# Patient Record
Sex: FEMALE | Race: OTHER | HISPANIC OR LATINO | URBAN - METROPOLITAN AREA
[De-identification: names, ages, dates, MRNs, and addresses within clinical notes are randomized per-mention and may not be internally consistent; named-entity substitution may affect disease eponyms.]

---

## 2018-09-21 ENCOUNTER — OUTPATIENT (OUTPATIENT)
Dept: OUTPATIENT SERVICES | Facility: HOSPITAL | Age: 33
LOS: 1 days | Discharge: HOME | End: 2018-09-21

## 2018-09-21 ENCOUNTER — APPOINTMENT (OUTPATIENT)
Dept: ENDOCRINOLOGY | Facility: CLINIC | Age: 33
End: 2018-09-21

## 2018-09-21 VITALS
SYSTOLIC BLOOD PRESSURE: 110 MMHG | HEIGHT: 62 IN | WEIGHT: 175 LBS | BODY MASS INDEX: 32.2 KG/M2 | DIASTOLIC BLOOD PRESSURE: 75 MMHG

## 2018-10-19 ENCOUNTER — OUTPATIENT (OUTPATIENT)
Dept: OUTPATIENT SERVICES | Facility: HOSPITAL | Age: 33
LOS: 1 days | Discharge: HOME | End: 2018-10-19

## 2018-10-19 ENCOUNTER — APPOINTMENT (OUTPATIENT)
Dept: ENDOCRINOLOGY | Facility: CLINIC | Age: 33
End: 2018-10-19

## 2018-10-19 VITALS
WEIGHT: 176 LBS | HEART RATE: 70 BPM | DIASTOLIC BLOOD PRESSURE: 65 MMHG | BODY MASS INDEX: 32.39 KG/M2 | SYSTOLIC BLOOD PRESSURE: 118 MMHG | HEIGHT: 62 IN

## 2018-10-19 DIAGNOSIS — E11.9 TYPE 2 DIABETES MELLITUS WITHOUT COMPLICATIONS: ICD-10-CM

## 2018-10-19 LAB
BASOPHILS # BLD AUTO: 0.02 K/UL
BASOPHILS NFR BLD AUTO: 0.4 %
EOSINOPHIL # BLD AUTO: 0.08 K/UL
EOSINOPHIL NFR BLD AUTO: 1.8 %
HCT VFR BLD CALC: 41.4 %
HGB BLD-MCNC: 12.8 G/DL
IMM GRANULOCYTES NFR BLD AUTO: 0.4 %
LYMPHOCYTES # BLD AUTO: 1.49 K/UL
LYMPHOCYTES NFR BLD AUTO: 32.7 %
MAN DIFF?: NORMAL
MCHC RBC-ENTMCNC: 24.8 PG
MCHC RBC-ENTMCNC: 30.9 G/DL
MCV RBC AUTO: 80.2 FL
MONOCYTES # BLD AUTO: 0.51 K/UL
MONOCYTES NFR BLD AUTO: 11.2 %
NEUTROPHILS # BLD AUTO: 2.44 K/UL
NEUTROPHILS NFR BLD AUTO: 53.5 %
PLATELET # BLD AUTO: 209 K/UL
RBC # BLD: 5.16 M/UL
RBC # FLD: 12.9 %
WBC # FLD AUTO: 4.56 K/UL

## 2018-10-19 NOTE — PHYSICAL EXAM
[No Acute Distress] : no acute distress [Well Nourished] : well nourished [Well Developed] : well developed [No Respiratory Distress] : no respiratory distress  [Clear to Auscultation] : lungs were clear to auscultation bilaterally [No Accessory Muscle Use] : no accessory muscle use [Normal Rate] : normal rate  [Regular Rhythm] : with a regular rhythm [Normal S1, S2] : normal S1 and S2 [Soft] : abdomen soft [Non Tender] : non-tender [No HSM] : no HSM [Normal Bowel Sounds] : normal bowel sounds

## 2018-10-19 NOTE — HISTORY OF PRESENT ILLNESS
[de-identified] : 32 Yo F with PMH of DM I on insulin is here for follow up visit, patient has been compliant with her insulin, still expresses interest in an insulin pump. patient states she has not been checking fingersticks, despite having equipment.

## 2018-10-19 NOTE — ASSESSMENT
[FreeTextEntry1] : 34 yo F presents for follow up.\par \par # DM:\par - patient is currently taking Basal 22 units /Nutritional 8/8/8 insulin\par - will refer to diabetic educator regarding insulin pump.\par - patient will report for blood work today, was not able to do it prior\par - f/u in 1 month.

## 2018-10-26 LAB
ALBUMIN SERPL ELPH-MCNC: 4.3 G/DL
ALP BLD-CCNC: 84 U/L
ALT SERPL-CCNC: 14 U/L
ANION GAP SERPL CALC-SCNC: 14 MMOL/L
AST SERPL-CCNC: 14 U/L
BILIRUB SERPL-MCNC: 0.3 MG/DL
BUN SERPL-MCNC: 13 MG/DL
CALCIUM SERPL-MCNC: 9.1 MG/DL
CHLORIDE SERPL-SCNC: 99 MMOL/L
CHOLEST SERPL-MCNC: 175 MG/DL
CHOLEST/HDLC SERPL: 3 RATIO
CO2 SERPL-SCNC: 25 MMOL/L
CREAT SERPL-MCNC: 0.8 MG/DL
CREAT SPEC-SCNC: 63 MG/DL
ESTIMATED AVERAGE GLUCOSE: 326 MG/DL
GLUCOSE SERPL-MCNC: 258 MG/DL
HBA1C MFR BLD HPLC: 13 %
HDLC SERPL-MCNC: 58 MG/DL
LDLC SERPL CALC-MCNC: 106 MG/DL
MICROALBUMIN 24H UR DL<=1MG/L-MCNC: <1.2 MG/DL
MICROALBUMIN/CREAT 24H UR-RTO: NORMAL
POTASSIUM SERPL-SCNC: 4.1 MMOL/L
PROT SERPL-MCNC: 7.2 G/DL
SODIUM SERPL-SCNC: 138 MMOL/L
TRIGL SERPL-MCNC: 98 MG/DL

## 2018-11-29 ENCOUNTER — OTHER (OUTPATIENT)
Age: 33
End: 2018-11-29

## 2018-11-30 ENCOUNTER — APPOINTMENT (OUTPATIENT)
Dept: ENDOCRINOLOGY | Facility: CLINIC | Age: 33
End: 2018-11-30

## 2018-12-07 ENCOUNTER — APPOINTMENT (OUTPATIENT)
Dept: ENDOCRINOLOGY | Facility: CLINIC | Age: 33
End: 2018-12-07

## 2018-12-07 ENCOUNTER — OUTPATIENT (OUTPATIENT)
Dept: OUTPATIENT SERVICES | Facility: HOSPITAL | Age: 33
LOS: 1 days | Discharge: HOME | End: 2018-12-07

## 2018-12-13 ENCOUNTER — APPOINTMENT (OUTPATIENT)
Dept: ANTEPARTUM | Facility: CLINIC | Age: 33
End: 2018-12-13

## 2018-12-13 ENCOUNTER — OUTPATIENT (OUTPATIENT)
Dept: OUTPATIENT SERVICES | Facility: HOSPITAL | Age: 33
LOS: 1 days | Discharge: HOME | End: 2018-12-13

## 2018-12-18 ENCOUNTER — LABORATORY RESULT (OUTPATIENT)
Age: 33
End: 2018-12-18

## 2018-12-18 ENCOUNTER — OUTPATIENT (OUTPATIENT)
Dept: OUTPATIENT SERVICES | Facility: HOSPITAL | Age: 33
LOS: 1 days | Discharge: HOME | End: 2018-12-18

## 2018-12-18 ENCOUNTER — NON-APPOINTMENT (OUTPATIENT)
Age: 33
End: 2018-12-18

## 2018-12-18 ENCOUNTER — APPOINTMENT (OUTPATIENT)
Dept: ANTEPARTUM | Facility: CLINIC | Age: 33
End: 2018-12-18

## 2018-12-18 ENCOUNTER — APPOINTMENT (OUTPATIENT)
Dept: OBGYN | Facility: CLINIC | Age: 33
End: 2018-12-18

## 2018-12-18 VITALS
OXYGEN SATURATION: 97 % | SYSTOLIC BLOOD PRESSURE: 136 MMHG | HEART RATE: 84 BPM | DIASTOLIC BLOOD PRESSURE: 78 MMHG | BODY MASS INDEX: 32.01 KG/M2 | WEIGHT: 175 LBS | TEMPERATURE: 98.5 F

## 2018-12-18 DIAGNOSIS — Z80.42 FAMILY HISTORY OF MALIGNANT NEOPLASM OF PROSTATE: ICD-10-CM

## 2018-12-18 LAB
BILIRUB UR QL STRIP: NEGATIVE
CLARITY UR: CLEAR
COLLECTION METHOD: NORMAL
FETAL HEART RATE (BPM): 184
GLUCOSE BLDC GLUCOMTR-MCNC: 184
GLUCOSE BLDC GLUCOMTR-MCNC: 184 MG/DL — HIGH (ref 70–99)
GLUCOSE UR-MCNC: 1000
HCG UR QL: 0.2 EU/DL
HGB UR QL STRIP.AUTO: NEGATIVE
KETONES UR-MCNC: NORMAL
LEUKOCYTE ESTERASE UR QL STRIP: NEGATIVE
NITRITE UR QL STRIP: NEGATIVE
PH UR STRIP: 6
PROT UR STRIP-MCNC: NEGATIVE
SCHEDULED VISIT: YES
SP GR UR STRIP: 1.03
URINE ALBUMIN/PROTEIN: NEGATIVE
URINE GLUCOSE: 1000
URINE KETONES: NORMAL
WEEKS GESTATION: 7.6

## 2018-12-19 DIAGNOSIS — O24.111 PRE-EXISTING TYPE 2 DIABETES MELLITUS, IN PREGNANCY, FIRST TRIMESTER: ICD-10-CM

## 2018-12-19 DIAGNOSIS — O09.521 SUPERVISION OF ELDERLY MULTIGRAVIDA, FIRST TRIMESTER: ICD-10-CM

## 2018-12-19 DIAGNOSIS — O99.211 OBESITY COMPLICATING PREGNANCY, FIRST TRIMESTER: ICD-10-CM

## 2018-12-21 ENCOUNTER — OUTPATIENT (OUTPATIENT)
Dept: OUTPATIENT SERVICES | Facility: HOSPITAL | Age: 33
LOS: 1 days | Discharge: HOME | End: 2018-12-21

## 2018-12-21 ENCOUNTER — LABORATORY RESULT (OUTPATIENT)
Age: 33
End: 2018-12-21

## 2018-12-21 DIAGNOSIS — O24.919 UNSPECIFIED DIABETES MELLITUS IN PREGNANCY, UNSPECIFIED TRIMESTER: ICD-10-CM

## 2018-12-21 DIAGNOSIS — E11.65 TYPE 2 DIABETES MELLITUS WITH HYPERGLYCEMIA: ICD-10-CM

## 2018-12-23 LAB
ALBUMIN SERPL ELPH-MCNC: 3.8 G/DL
ALP BLD-CCNC: 55 U/L
ALT SERPL-CCNC: 12 U/L
ANION GAP SERPL CALC-SCNC: 12 MMOL/L
AST SERPL-CCNC: 10 U/L
BACTERIA UR CULT: NORMAL
BASOPHILS # BLD AUTO: 0.01 K/UL
BASOPHILS NFR BLD AUTO: 0.1 %
BILIRUB SERPL-MCNC: 0.3 MG/DL
BUN SERPL-MCNC: 14 MG/DL
CALCIUM SERPL-MCNC: 8.8 MG/DL
CHLORIDE SERPL-SCNC: 103 MMOL/L
CO2 SERPL-SCNC: 24 MMOL/L
CREAT 24H UR-MCNC: 2 G/24 HR
CREAT 24H UR-MCNC: 2 G/24 HR
CREAT ?TM UR-MCNC: 129 MG/DL
CREAT ?TM UR-MCNC: 129 MG/DL
CREAT SERPL-MCNC: 0.6 MG/DL
EOSINOPHIL # BLD AUTO: 0.02 K/UL
EOSINOPHIL NFR BLD AUTO: 0.3 %
ESTIMATED AVERAGE GLUCOSE: 286 MG/DL
GLUCOSE SERPL-MCNC: 119 MG/DL
HBA1C MFR BLD HPLC: 11.6 %
HBV SURFACE AG SERPL QL IA: NONREACTIVE
HCT VFR BLD CALC: 36.2 %
HGB BLD-MCNC: 11.3 G/DL
HIV1+2 AB SPEC QL IA.RAPID: NONREACTIVE
HPV HIGH+LOW RISK DNA PNL CVX: DETECTED
IMM GRANULOCYTES NFR BLD AUTO: 0.3 %
LDH SERPL-CCNC: 152
LYMPHOCYTES # BLD AUTO: 1.73 K/UL
LYMPHOCYTES NFR BLD AUTO: 23.4 %
MAN DIFF?: NORMAL
MCHC RBC-ENTMCNC: 24.8 PG
MCHC RBC-ENTMCNC: 31.2 G/DL
MCV RBC AUTO: 79.6 FL
MONOCYTES # BLD AUTO: 0.53 K/UL
MONOCYTES NFR BLD AUTO: 7.2 %
NEUTROPHILS # BLD AUTO: 5.08 K/UL
NEUTROPHILS NFR BLD AUTO: 68.7 %
PLATELET # BLD AUTO: 249 K/UL
POTASSIUM SERPL-SCNC: 3.7 MMOL/L
PROT 24H UR-MRATE: 17 MG/DL
PROT ?TM UR-MCNC: 24 HR
PROT ?TM UR-MCNC: 24 HR
PROT SERPL-MCNC: 6.6 G/DL
PROT UR-MCNC: 264 MG/24 H
RBC # BLD: 4.55 M/UL
RBC # FLD: 13.2 %
RUBV IGG FLD-ACNC: 1.1 INDEX
RUBV IGG SER-IMP: POSITIVE
SODIUM SERPL-SCNC: 139 MMOL/L
SPECIMEN VOL 24H UR: 1550 ML
SPECIMEN VOL 24H UR: 1550 ML
T PALLIDUM AB SER QL IA: NEGATIVE
URATE SERPL-MCNC: 3.5 MG/DL
VZV AB TITR SER: POSITIVE
VZV IGG SER IF-ACNC: 246.7 INDEX
WBC # FLD AUTO: 7.39 K/UL

## 2018-12-24 LAB
HGB A MFR BLD: 96.8 %
HGB A2 MFR BLD: 2.8 %
HGB F MFR BLD: 0.4 %
HGB FRACT BLD-IMP: NORMAL
LEAD BLD-MCNC: 2 UG/DL

## 2018-12-27 ENCOUNTER — NON-APPOINTMENT (OUTPATIENT)
Age: 33
End: 2018-12-27

## 2018-12-27 ENCOUNTER — APPOINTMENT (OUTPATIENT)
Dept: ANTEPARTUM | Facility: CLINIC | Age: 33
End: 2018-12-27

## 2018-12-27 ENCOUNTER — APPOINTMENT (OUTPATIENT)
Dept: OBGYN | Facility: CLINIC | Age: 33
End: 2018-12-27

## 2018-12-27 ENCOUNTER — OUTPATIENT (OUTPATIENT)
Dept: OUTPATIENT SERVICES | Facility: HOSPITAL | Age: 33
LOS: 1 days | Discharge: HOME | End: 2018-12-27

## 2018-12-27 ENCOUNTER — OTHER (OUTPATIENT)
Age: 33
End: 2018-12-27

## 2018-12-27 VITALS
HEART RATE: 87 BPM | BODY MASS INDEX: 32.37 KG/M2 | WEIGHT: 177 LBS | DIASTOLIC BLOOD PRESSURE: 68 MMHG | TEMPERATURE: 98 F | OXYGEN SATURATION: 95 % | SYSTOLIC BLOOD PRESSURE: 122 MMHG

## 2018-12-27 LAB
BILIRUB UR QL STRIP: NEGATIVE
CLARITY UR: CLEAR
COLLECTION METHOD: NORMAL
FETAL HEART RATE (BPM): 161
GLUCOSE BLDC GLUCOMTR-MCNC: 152
GLUCOSE BLDC GLUCOMTR-MCNC: 152 MG/DL — HIGH (ref 70–99)
GLUCOSE UR-MCNC: 500
HCG UR QL: 0.2 EU/DL
HGB UR QL STRIP.AUTO: NEGATIVE
KETONES UR-MCNC: NEGATIVE
LEUKOCYTE ESTERASE UR QL STRIP: NEGATIVE
NITRITE UR QL STRIP: NEGATIVE
PH UR STRIP: 6
PROT UR STRIP-MCNC: NEGATIVE
SCHEDULED VISIT: YES
SP GR UR STRIP: 1.02
URINE ALBUMIN/PROTEIN: NEGATIVE
URINE GLUCOSE: 500
URINE KETONES: NEGATIVE
WEEKS GESTATION: 9.1

## 2018-12-28 DIAGNOSIS — E10.9 TYPE 1 DIABETES MELLITUS WITHOUT COMPLICATIONS: ICD-10-CM

## 2018-12-31 LAB — AR GENE MUT ANL BLD/T: NEGATIVE

## 2019-01-03 ENCOUNTER — OUTPATIENT (OUTPATIENT)
Dept: OUTPATIENT SERVICES | Facility: HOSPITAL | Age: 34
LOS: 1 days | Discharge: HOME | End: 2019-01-03

## 2019-01-03 ENCOUNTER — NON-APPOINTMENT (OUTPATIENT)
Age: 34
End: 2019-01-03

## 2019-01-03 ENCOUNTER — APPOINTMENT (OUTPATIENT)
Dept: ANTEPARTUM | Facility: CLINIC | Age: 34
End: 2019-01-03

## 2019-01-03 ENCOUNTER — APPOINTMENT (OUTPATIENT)
Dept: OBGYN | Facility: CLINIC | Age: 34
End: 2019-01-03

## 2019-01-03 ENCOUNTER — RESULT CHARGE (OUTPATIENT)
Age: 34
End: 2019-01-03

## 2019-01-03 VITALS
DIASTOLIC BLOOD PRESSURE: 71 MMHG | BODY MASS INDEX: 32.74 KG/M2 | WEIGHT: 179 LBS | TEMPERATURE: 98 F | SYSTOLIC BLOOD PRESSURE: 126 MMHG | OXYGEN SATURATION: 100 % | HEART RATE: 85 BPM

## 2019-01-03 DIAGNOSIS — O24.919 UNSPECIFIED DIABETES MELLITUS IN PREGNANCY, UNSPECIFIED TRIMESTER: ICD-10-CM

## 2019-01-03 DIAGNOSIS — E11.65 TYPE 2 DIABETES MELLITUS WITH HYPERGLYCEMIA: ICD-10-CM

## 2019-01-03 LAB
BILIRUB UR QL STRIP: NEGATIVE
CLARITY UR: CLEAR
COLLECTION METHOD: NORMAL
FETAL HEART DESCRIPTION: NORMAL
FETAL HEART RATE (BPM): 165
GLUCOSE BLDC GLUCOMTR-MCNC: 94
GLUCOSE BLDC GLUCOMTR-MCNC: 94 MG/DL — SIGNIFICANT CHANGE UP (ref 70–99)
GLUCOSE UR-MCNC: 500
HCG UR QL: 0.2 EU/DL
HGB UR QL STRIP.AUTO: NEGATIVE
KETONES UR-MCNC: NEGATIVE
LEUKOCYTE ESTERASE UR QL STRIP: NEGATIVE
NITRITE UR QL STRIP: NEGATIVE
PH UR STRIP: 6
PROT UR STRIP-MCNC: NEGATIVE
SCHEDULED VISIT: YES
SP GR UR STRIP: 1.02
URINE ALBUMIN/PROTEIN: NEGATIVE
URINE GLUCOSE: 500
URINE KETONES: NEGATIVE
WEEKS GESTATION: NORMAL

## 2019-01-03 RX ORDER — TERCONAZOLE 8 MG/G
0.8 CREAM VAGINAL
Qty: 1 | Refills: 1 | Status: DISCONTINUED | COMMUNITY
Start: 2018-12-27 | End: 2019-01-03

## 2019-01-04 ENCOUNTER — OUTPATIENT (OUTPATIENT)
Dept: OUTPATIENT SERVICES | Facility: HOSPITAL | Age: 34
LOS: 1 days | Discharge: HOME | End: 2019-01-04

## 2019-01-04 DIAGNOSIS — E11.65 TYPE 2 DIABETES MELLITUS WITH HYPERGLYCEMIA: ICD-10-CM

## 2019-01-04 DIAGNOSIS — O09.91 SUPERVISION OF HIGH RISK PREGNANCY, UNSPECIFIED, FIRST TRIMESTER: ICD-10-CM

## 2019-01-04 DIAGNOSIS — Z3A.10 10 WEEKS GESTATION OF PREGNANCY: ICD-10-CM

## 2019-01-04 DIAGNOSIS — O24.111 PRE-EXISTING TYPE 2 DIABETES MELLITUS, IN PREGNANCY, FIRST TRIMESTER: ICD-10-CM

## 2019-01-07 LAB
A VAGINAE DNA VAG QL NAA+PROBE: ABNORMAL
BVAB2 DNA VAG QL NAA+PROBE: NORMAL
C KRUSEI DNA VAG QL NAA+PROBE: NEGATIVE
C KRUSEI DNA VAG QL NAA+PROBE: POSITIVE
C TRACH RRNA SPEC QL NAA+PROBE: NEGATIVE
MEGA1 DNA VAG QL NAA+PROBE: NORMAL
N GONORRHOEA RRNA SPEC QL NAA+PROBE: NEGATIVE
T VAGINALIS RRNA SPEC QL NAA+PROBE: NEGATIVE

## 2019-01-09 ENCOUNTER — OUTPATIENT (OUTPATIENT)
Dept: OUTPATIENT SERVICES | Facility: HOSPITAL | Age: 34
LOS: 1 days | Discharge: HOME | End: 2019-01-09

## 2019-01-09 ENCOUNTER — APPOINTMENT (OUTPATIENT)
Dept: OBGYN | Facility: CLINIC | Age: 34
End: 2019-01-09

## 2019-01-09 NOTE — PROCEDURE
[Colposcopy] : colposcopy [ASCUS] : atypical squamous cells of undetermined significance [HPV high risk] : PCR positive for high risk HPV [Patient] : patient [Risks] : risks [Benefits] : benefits [Alternatives] : alternatives [Infection] : infection [Bleeding] : bleeding [Allergic Reaction] : allergic reaction [Consent Obtained] : written consent was obtained prior to the procedure [Pap Performed] : a cervical Pap smear was not performed [SCJ Fully Visulized] : the squamocolumnar junction was fully visualized [No Abnormalities] : no abnormalities seen [Biopsies Taken: # ___] : no biopsies were taken of the cervix [ECC Done] : Endocervical curettage was not performed. [Tolerated Well] : the patient tolerated the procedure well [No Complications] : there were no complications [FreeTextEntry9] : Pt pregnant

## 2019-01-10 ENCOUNTER — APPOINTMENT (OUTPATIENT)
Dept: OBGYN | Facility: CLINIC | Age: 34
End: 2019-01-10

## 2019-01-11 ENCOUNTER — APPOINTMENT (OUTPATIENT)
Dept: ANTEPARTUM | Facility: CLINIC | Age: 34
End: 2019-01-11

## 2019-01-11 DIAGNOSIS — R87.810 CERVICAL HIGH RISK HUMAN PAPILLOMAVIRUS (HPV) DNA TEST POSITIVE: ICD-10-CM

## 2019-01-11 DIAGNOSIS — R87.610 ATYPICAL SQUAMOUS CELLS OF UNDETERMINED SIGNIFICANCE ON CYTOLOGIC SMEAR OF CERVIX (ASC-US): ICD-10-CM

## 2019-01-17 ENCOUNTER — APPOINTMENT (OUTPATIENT)
Dept: ANTEPARTUM | Facility: CLINIC | Age: 34
End: 2019-01-17

## 2019-01-17 ENCOUNTER — NON-APPOINTMENT (OUTPATIENT)
Age: 34
End: 2019-01-17

## 2019-01-17 ENCOUNTER — OUTPATIENT (OUTPATIENT)
Dept: OUTPATIENT SERVICES | Facility: HOSPITAL | Age: 34
LOS: 1 days | Discharge: HOME | End: 2019-01-17

## 2019-01-17 VITALS
OXYGEN SATURATION: 99 % | BODY MASS INDEX: 33.11 KG/M2 | HEART RATE: 81 BPM | WEIGHT: 181 LBS | DIASTOLIC BLOOD PRESSURE: 60 MMHG | TEMPERATURE: 97.9 F | SYSTOLIC BLOOD PRESSURE: 116 MMHG

## 2019-01-17 LAB
BILIRUB UR QL STRIP: NEGATIVE
CLARITY UR: CLEAR
COLLECTION METHOD: NORMAL
FETAL HEART RATE (BPM): 153
GLUCOSE BLDC GLUCOMTR-MCNC: 111
GLUCOSE BLDC GLUCOMTR-MCNC: 111 MG/DL — HIGH (ref 70–99)
GLUCOSE UR-MCNC: NEGATIVE
HCG UR QL: 0.2 EU/DL
HGB UR QL STRIP.AUTO: NEGATIVE
KETONES UR-MCNC: NEGATIVE
LEUKOCYTE ESTERASE UR QL STRIP: NORMAL
NITRITE UR QL STRIP: NEGATIVE
OB COMMENTS: NORMAL
PH UR STRIP: 6.5
PROT UR STRIP-MCNC: NEGATIVE
SCHEDULED VISIT: YES
SP GR UR STRIP: 1.01
URINE ALBUMIN/PROTEIN: NEGATIVE
URINE GLUCOSE: NEGATIVE
URINE KETONES: NEGATIVE
WEEKS GESTATION: 12.1

## 2019-01-18 ENCOUNTER — OUTPATIENT (OUTPATIENT)
Dept: OUTPATIENT SERVICES | Facility: HOSPITAL | Age: 34
LOS: 1 days | Discharge: HOME | End: 2019-01-18

## 2019-01-18 DIAGNOSIS — E11.9 TYPE 2 DIABETES MELLITUS WITHOUT COMPLICATIONS: ICD-10-CM

## 2019-01-18 DIAGNOSIS — O35.1XX0 MATERNAL CARE FOR (SUSPECTED) CHROMOSOMAL ABNORMALITY IN FETUS, NOT APPLICABLE OR UNSPECIFIED: ICD-10-CM

## 2019-01-18 DIAGNOSIS — O99.211 OBESITY COMPLICATING PREGNANCY, FIRST TRIMESTER: ICD-10-CM

## 2019-01-18 DIAGNOSIS — O24.419 GESTATIONAL DIABETES MELLITUS IN PREGNANCY, UNSPECIFIED CONTROL: ICD-10-CM

## 2019-01-18 DIAGNOSIS — O09.521 SUPERVISION OF ELDERLY MULTIGRAVIDA, FIRST TRIMESTER: ICD-10-CM

## 2019-01-18 DIAGNOSIS — O24.111 PRE-EXISTING TYPE 2 DIABETES MELLITUS, IN PREGNANCY, FIRST TRIMESTER: ICD-10-CM

## 2019-01-23 ENCOUNTER — OUTPATIENT (OUTPATIENT)
Dept: OUTPATIENT SERVICES | Facility: HOSPITAL | Age: 34
LOS: 1 days | Discharge: HOME | End: 2019-01-23

## 2019-01-23 DIAGNOSIS — O24.919 UNSPECIFIED DIABETES MELLITUS IN PREGNANCY, UNSPECIFIED TRIMESTER: ICD-10-CM

## 2019-01-24 LAB
ABO + RH PNL BLD: NORMAL
BLD GP AB SCN SERPL QL: NORMAL

## 2019-01-25 ENCOUNTER — NON-APPOINTMENT (OUTPATIENT)
Age: 34
End: 2019-01-25

## 2019-01-25 ENCOUNTER — RESULT CHARGE (OUTPATIENT)
Age: 34
End: 2019-01-25

## 2019-01-25 ENCOUNTER — OUTPATIENT (OUTPATIENT)
Dept: OUTPATIENT SERVICES | Facility: HOSPITAL | Age: 34
LOS: 1 days | Discharge: HOME | End: 2019-01-25

## 2019-01-25 ENCOUNTER — APPOINTMENT (OUTPATIENT)
Dept: ANTEPARTUM | Facility: CLINIC | Age: 34
End: 2019-01-25

## 2019-01-25 VITALS
OXYGEN SATURATION: 96 % | DIASTOLIC BLOOD PRESSURE: 61 MMHG | WEIGHT: 184 LBS | SYSTOLIC BLOOD PRESSURE: 131 MMHG | HEART RATE: 72 BPM | BODY MASS INDEX: 33.86 KG/M2 | HEIGHT: 62 IN | TEMPERATURE: 97.8 F

## 2019-01-25 LAB
BILIRUB UR QL STRIP: NORMAL
CLARITY UR: CLEAR
COLLECTION METHOD: NORMAL
FETAL HEART RATE (BPM): 150
FETAL MOVEMENT: PRESENT
GLUCOSE BLDC GLUCOMTR-MCNC: 178
GLUCOSE BLDC GLUCOMTR-MCNC: 178 MG/DL — HIGH (ref 70–99)
GLUCOSE UR-MCNC: NORMAL
HCG UR QL: 0.2 EU/DL
HGB UR QL STRIP.AUTO: NORMAL
KETONES UR-MCNC: NORMAL
LEUKOCYTE ESTERASE UR QL STRIP: NORMAL
NITRITE UR QL STRIP: NORMAL
PH UR STRIP: 6
PROT UR STRIP-MCNC: NORMAL
SP GR UR STRIP: 1.02
URINE ALBUMIN/PROTEIN: NORMAL
URINE GLUCOSE: NORMAL
URINE KETONES: NORMAL
WEEKS GESTATION: NORMAL

## 2019-01-31 DIAGNOSIS — O24.111 PRE-EXISTING TYPE 2 DIABETES MELLITUS, IN PREGNANCY, FIRST TRIMESTER: ICD-10-CM

## 2019-01-31 DIAGNOSIS — Z3A.13 13 WEEKS GESTATION OF PREGNANCY: ICD-10-CM

## 2019-01-31 DIAGNOSIS — O99.211 OBESITY COMPLICATING PREGNANCY, FIRST TRIMESTER: ICD-10-CM

## 2019-01-31 DIAGNOSIS — O09.91 SUPERVISION OF HIGH RISK PREGNANCY, UNSPECIFIED, FIRST TRIMESTER: ICD-10-CM

## 2019-01-31 DIAGNOSIS — O09.521 SUPERVISION OF ELDERLY MULTIGRAVIDA, FIRST TRIMESTER: ICD-10-CM

## 2019-02-08 ENCOUNTER — NON-APPOINTMENT (OUTPATIENT)
Age: 34
End: 2019-02-08

## 2019-02-08 ENCOUNTER — OUTPATIENT (OUTPATIENT)
Dept: OUTPATIENT SERVICES | Facility: HOSPITAL | Age: 34
LOS: 1 days | Discharge: HOME | End: 2019-02-08

## 2019-02-08 ENCOUNTER — APPOINTMENT (OUTPATIENT)
Dept: ANTEPARTUM | Facility: CLINIC | Age: 34
End: 2019-02-08

## 2019-02-08 VITALS
OXYGEN SATURATION: 98 % | BODY MASS INDEX: 34.02 KG/M2 | WEIGHT: 186 LBS | TEMPERATURE: 98 F | DIASTOLIC BLOOD PRESSURE: 57 MMHG | SYSTOLIC BLOOD PRESSURE: 120 MMHG | HEART RATE: 82 BPM

## 2019-02-08 LAB
BILIRUB UR QL STRIP: NEGATIVE
CLARITY UR: CLEAR
COLLECTION METHOD: NORMAL
FETAL HEART RATE (BPM): 158
GLUCOSE BLDC GLUCOMTR-MCNC: 121
GLUCOSE BLDC GLUCOMTR-MCNC: 121 MG/DL — HIGH (ref 70–99)
GLUCOSE UR-MCNC: 500
HCG UR QL: 0.2 EU/DL
HGB UR QL STRIP.AUTO: NORMAL
KETONES UR-MCNC: NORMAL
LEUKOCYTE ESTERASE UR QL STRIP: NEGATIVE
NITRITE UR QL STRIP: NEGATIVE
PH UR STRIP: 6
PROT UR STRIP-MCNC: NORMAL
SCHEDULED VISIT: YES
SP GR UR STRIP: 1.03
URINE ALBUMIN/PROTEIN: NORMAL
URINE GLUCOSE: NORMAL
URINE KETONES: NORMAL
WEEKS GESTATION: 15.2

## 2019-02-11 DIAGNOSIS — O99.212 OBESITY COMPLICATING PREGNANCY, SECOND TRIMESTER: ICD-10-CM

## 2019-02-11 DIAGNOSIS — O09.522 SUPERVISION OF ELDERLY MULTIGRAVIDA, SECOND TRIMESTER: ICD-10-CM

## 2019-02-11 DIAGNOSIS — O24.112 PRE-EXISTING TYPE 2 DIABETES MELLITUS, IN PREGNANCY, SECOND TRIMESTER: ICD-10-CM

## 2019-02-11 DIAGNOSIS — Z3A.15 15 WEEKS GESTATION OF PREGNANCY: ICD-10-CM

## 2019-02-11 DIAGNOSIS — O09.92 SUPERVISION OF HIGH RISK PREGNANCY, UNSPECIFIED, SECOND TRIMESTER: ICD-10-CM

## 2019-02-18 ENCOUNTER — EMERGENCY (EMERGENCY)
Facility: HOSPITAL | Age: 34
LOS: 0 days | Discharge: HOME | End: 2019-02-18
Attending: EMERGENCY MEDICINE | Admitting: EMERGENCY MEDICINE

## 2019-02-18 VITALS
HEART RATE: 84 BPM | SYSTOLIC BLOOD PRESSURE: 142 MMHG | RESPIRATION RATE: 18 BRPM | DIASTOLIC BLOOD PRESSURE: 60 MMHG | TEMPERATURE: 98 F | OXYGEN SATURATION: 100 %

## 2019-02-18 DIAGNOSIS — O99.612 DISEASES OF THE DIGESTIVE SYSTEM COMPLICATING PREGNANCY, SECOND TRIMESTER: ICD-10-CM

## 2019-02-18 DIAGNOSIS — K08.89 OTHER SPECIFIED DISORDERS OF TEETH AND SUPPORTING STRUCTURES: ICD-10-CM

## 2019-02-18 DIAGNOSIS — O24.419 GESTATIONAL DIABETES MELLITUS IN PREGNANCY, UNSPECIFIED CONTROL: ICD-10-CM

## 2019-02-18 RX ORDER — AMOXICILLIN 250 MG/5ML
1 SUSPENSION, RECONSTITUTED, ORAL (ML) ORAL
Qty: 14 | Refills: 0
Start: 2019-02-18 | End: 2019-02-24

## 2019-02-18 NOTE — ED PROVIDER NOTE - OBJECTIVE STATEMENT
32yo f pmhx DM, 16wks pregnant with confirmed IUP by [facility] presents CC R lower jaw tooth pain x 4 days. pt took tyelnol yesterday but pain persisting, progressing to be associated with R sided, gradual in onset headache. pt denies fevers, chills, nausea, vomiting, drainage from region, warmth to region, difficulty swallowing or speaking. pt reports hx of poor dentition. 32yo f pmhx DM, 16wks pregnant with confirmed IUP by [facility] presents CC R lower jaw tooth pain x 4 days. pt took tyelnol yesterday but pain persisting, progressing to be associated with R sided, gradual in onset headache. pt denies fevers, chills, nausea, vomiting, drainage from region, warmth to region, difficulty swallowing or speaking. pt reports hx of poor dentition. pt is tolerating po. pt has dentist with whom she follows but could not get appointment for today.

## 2019-02-18 NOTE — ED PROVIDER NOTE - NSFOLLOWUPINSTRUCTIONS_ED_ALL_ED_FT
Follow up with your primary care doctor and dentist in 1-3 days     Dental Pain    Dental pain (toothache) may be caused by many things including tooth decay (cavities or caries), abscess or infection, or trauma. If you were prescribed an antibiotic medicine, finish all of it even if you start to feel better. Rinsing your mouth with salt water or applying ice to the painful area of your face may help with the pain. Follow up with a dentist is important in ensuring good oral health and preventing the worsening of dental disease.    SEEK IMMEDIATE MEDICAL CARE IF YOU HAVE ANY OF THE FOLLOWING SYMPTOMS: unable to open your mouth, trouble breathing or swallowing, fever, or swelling of the face, neck, or jaw.

## 2019-02-18 NOTE — ED PROVIDER NOTE - NS ED ROS FT
General: No fevers, chills, nausea, vomiting  Eyes:  No visual changes, eye pain or discharge.  ENMT:  No hearing changes, pain, no difficulty swallowing  Cardiac:  No chest pain, SOB or edema.  Respiratory:  No cough or respiratory distress.   GI:  No nausea, vomiting, diarrhea or abdominal pain.  :  No dysuria, frequency or burning.  MS:  No back pain.  Neuro:  No LOC.  Skin:  No skin rash.   Endocrine: +diabetes.

## 2019-02-18 NOTE — ED PROVIDER NOTE - ATTENDING CONTRIBUTION TO CARE
I personally evaluated the patient. I reviewed the Resident’s or Physician Assistant’s note (as assigned above), and agree with the findings and plan except as documented in my note. 32 y/o F, 16 weeks pregnant, presents for evaluation of gum pain that started about 4 days, ago, has not been improving so came to ED for eval, pt has no associated fevers, chills. Pt complains of not being able to eat due to the pain, Pt is on insulin, not on any other medication.  Exam: no trismus, normal uvula, bottom gum inflamed and td to touch no focal td over teeth. Impression: inflammatory  gum disease ,will start on amoxicillin, and f/u with dental

## 2019-02-18 NOTE — ED PROVIDER NOTE - CLINICAL SUMMARY MEDICAL DECISION MAKING FREE TEXT BOX
gum infection with swelling patient is pregnant, advised tylenol and will start on amoxicillin.  with fu dental and ob.

## 2019-02-18 NOTE — ED PROVIDER NOTE - PROGRESS NOTE DETAILS
R 144 POCUS. will sent abx to pharmacy. Patient to be discharged from ED. Any available test results were discussed with patient and/or family. Verbal instructions given, including instructions to return to ED immediately for any new, worsening, or concerning symptoms. Patient endorsed understanding. Written discharge instructions additionally given, including follow-up plan. I personally evaluated the patient. I reviewed the Resident’s or Physician Assistant’s note (as assigned above), and agree with the findings and plan except as documented in my note. 34 y/o F, 16 weeks pregnant, presents for evaluation of gum pain that started about 4 days, ago, has not been improving so came to ED for eval, pt has no associated fevers, chills. Pt complains of not being able to eat due to the pain, associated pain in head. Pt is on insulin, not on any other medication.  Exam: no trismus, normal uvula, bottom gum inflamed and td to touch no focal td over teeth. Impression: inflammatory  gum disease ,will start on amoxicillin, and f/u with dental

## 2019-02-18 NOTE — ED PROVIDER NOTE - PHYSICAL EXAMINATION
CONSTITUTIONAL: Well-developed; well-nourished; in no acute distress, speaking in full sentences  SKIN: warm, dry  HEAD: Normocephalic; atraumatic  EYES: PERRL, EOMI, no conjunctival erythema  ENT: No nasal discharge; airway clear, mucous membranes moist, +poor dentition diffusely with prior filling and poor dentition to the tooth bothering her 28 and 29  which are ttp with no gum line tenderness or fluctuance or discharge, no floor of the mouth edema or ttp, no tmj tenderness or crepitus. speech clear, managing secretions   NECK: Supple; non tender, FROM  CARD: +S1, S2 no murmurs, gallops, or rubs. Regular rate and rhythm. radial 2+  RESP: No wheezes, rales or rhonchi. CTABL  ABD: soft ntnd, no rebound, no guarding, no rigidity  EXT: moves all extremities, ambulates wo assistance No clubbing, cyanosis or edema.   NEURO: Alert, oriented, grossly unremarkable, no focal deficits, cn ii-xii grossly intact  PSYCH: Cooperative, appropriate

## 2019-02-22 ENCOUNTER — NON-APPOINTMENT (OUTPATIENT)
Age: 34
End: 2019-02-22

## 2019-02-22 ENCOUNTER — APPOINTMENT (OUTPATIENT)
Dept: ANTEPARTUM | Facility: CLINIC | Age: 34
End: 2019-02-22

## 2019-02-22 ENCOUNTER — OUTPATIENT (OUTPATIENT)
Dept: OUTPATIENT SERVICES | Facility: HOSPITAL | Age: 34
LOS: 1 days | Discharge: HOME | End: 2019-02-22

## 2019-02-22 VITALS
WEIGHT: 192 LBS | BODY MASS INDEX: 35.12 KG/M2 | OXYGEN SATURATION: 98 % | DIASTOLIC BLOOD PRESSURE: 54 MMHG | TEMPERATURE: 97.9 F | SYSTOLIC BLOOD PRESSURE: 110 MMHG | HEART RATE: 95 BPM

## 2019-02-22 DIAGNOSIS — O09.512 SUPERVISION OF ELDERLY PRIMIGRAVIDA, SECOND TRIMESTER: ICD-10-CM

## 2019-02-22 DIAGNOSIS — O24.112 PRE-EXISTING TYPE 2 DIABETES MELLITUS, IN PREGNANCY, SECOND TRIMESTER: ICD-10-CM

## 2019-02-22 DIAGNOSIS — O24.111 PRE-EXISTING TYPE 2 DIABETES MELLITUS, IN PREGNANCY, FIRST TRIMESTER: ICD-10-CM

## 2019-02-22 DIAGNOSIS — O99.212 OBESITY COMPLICATING PREGNANCY, SECOND TRIMESTER: ICD-10-CM

## 2019-02-22 DIAGNOSIS — O09.92 SUPERVISION OF HIGH RISK PREGNANCY, UNSPECIFIED, SECOND TRIMESTER: ICD-10-CM

## 2019-02-22 LAB
FETAL HEART RATE (BPM): 128
GLUCOSE BLDC GLUCOMTR-MCNC: 152 MG/DL — HIGH (ref 70–99)
SCHEDULED VISIT: YES
URINE ALBUMIN/PROTEIN: NEGATIVE
URINE GLUCOSE: NEGATIVE
URINE KETONES: NEGATIVE
WEEKS GESTATION: 17.2

## 2019-02-27 LAB
BILIRUB UR QL STRIP: NEGATIVE
CLARITY UR: CLEAR
COLLECTION METHOD: NORMAL
GLUCOSE BLDC GLUCOMTR-MCNC: 152
GLUCOSE UR-MCNC: NEGATIVE
HCG UR QL: 0.2 EU/DL
HGB UR QL STRIP.AUTO: NEGATIVE
KETONES UR-MCNC: NEGATIVE
LEUKOCYTE ESTERASE UR QL STRIP: NEGATIVE
NITRITE UR QL STRIP: NEGATIVE
PH UR STRIP: 6
PROT UR STRIP-MCNC: NEGATIVE
SP GR UR STRIP: 1.03

## 2019-03-08 ENCOUNTER — APPOINTMENT (OUTPATIENT)
Dept: ENDOCRINOLOGY | Facility: CLINIC | Age: 34
End: 2019-03-08

## 2019-03-08 LAB
ESTIMATED AVERAGE GLUCOSE: 171 MG/DL
HBA1C MFR BLD HPLC: 7.6 %

## 2019-03-14 ENCOUNTER — RESULT CHARGE (OUTPATIENT)
Age: 34
End: 2019-03-14

## 2019-03-14 ENCOUNTER — APPOINTMENT (OUTPATIENT)
Dept: ANTEPARTUM | Facility: CLINIC | Age: 34
End: 2019-03-14

## 2019-03-14 ENCOUNTER — OUTPATIENT (OUTPATIENT)
Dept: OUTPATIENT SERVICES | Facility: HOSPITAL | Age: 34
LOS: 1 days | Discharge: HOME | End: 2019-03-14

## 2019-03-14 ENCOUNTER — NON-APPOINTMENT (OUTPATIENT)
Age: 34
End: 2019-03-14

## 2019-03-14 VITALS — HEART RATE: 90 BPM | DIASTOLIC BLOOD PRESSURE: 60 MMHG | SYSTOLIC BLOOD PRESSURE: 117 MMHG

## 2019-03-14 VITALS
OXYGEN SATURATION: 97 % | HEART RATE: 98 BPM | BODY MASS INDEX: 34.57 KG/M2 | SYSTOLIC BLOOD PRESSURE: 152 MMHG | DIASTOLIC BLOOD PRESSURE: 65 MMHG | TEMPERATURE: 97.9 F | WEIGHT: 189 LBS

## 2019-03-14 LAB
BILIRUB UR QL STRIP: NEGATIVE
CLARITY UR: NORMAL
COLLECTION METHOD: NORMAL
FETAL HEART RATE (BPM): 158
FETAL MOVEMENT: PRESENT
GLUCOSE BLDC GLUCOMTR-MCNC: 152
GLUCOSE BLDC GLUCOMTR-MCNC: 152 MG/DL — HIGH (ref 70–99)
GLUCOSE UR-MCNC: NEGATIVE
HCG UR QL: 0.2 EU/DL
HGB UR QL STRIP.AUTO: NEGATIVE
KETONES UR-MCNC: NEGATIVE
LEUKOCYTE ESTERASE UR QL STRIP: NORMAL
NITRITE UR QL STRIP: NEGATIVE
OB COMMENTS: NORMAL
PH UR STRIP: 6.5
PROT UR STRIP-MCNC: NORMAL
SCHEDULED VISIT: YES
SP GR UR STRIP: 1.02
URINE ALBUMIN/PROTEIN: NORMAL
URINE GLUCOSE: NEGATIVE
URINE KETONES: NEGATIVE
WEEKS GESTATION: 20.1

## 2019-03-15 DIAGNOSIS — O35.8XX1 MATERNAL CARE FOR OTHER (SUSPECTED) FETAL ABNORMALITY AND DAMAGE, FETUS 1: ICD-10-CM

## 2019-03-15 DIAGNOSIS — O24.112 PRE-EXISTING TYPE 2 DIABETES MELLITUS, IN PREGNANCY, SECOND TRIMESTER: ICD-10-CM

## 2019-03-15 DIAGNOSIS — O09.522 SUPERVISION OF ELDERLY MULTIGRAVIDA, SECOND TRIMESTER: ICD-10-CM

## 2019-03-25 LAB
CLARI ADDITIONAL INFO: NORMAL
CLARI CHROMOSOME 13: NORMAL
CLARI CHROMOSOME 18: NORMAL
CLARI CHROMOSOME 21: NORMAL
CLARI SEX CHROMOSOMES: NORMAL
CLARITEST NIPT: NORMAL

## 2019-03-28 ENCOUNTER — APPOINTMENT (OUTPATIENT)
Dept: ANTEPARTUM | Facility: CLINIC | Age: 34
End: 2019-03-28

## 2019-04-02 ENCOUNTER — APPOINTMENT (OUTPATIENT)
Dept: PEDIATRIC CARDIOLOGY | Facility: CLINIC | Age: 34
End: 2019-04-02

## 2019-04-04 ENCOUNTER — RESULT CHARGE (OUTPATIENT)
Age: 34
End: 2019-04-04

## 2019-04-04 ENCOUNTER — OUTPATIENT (OUTPATIENT)
Dept: OUTPATIENT SERVICES | Facility: HOSPITAL | Age: 34
LOS: 1 days | Discharge: HOME | End: 2019-04-04

## 2019-04-04 ENCOUNTER — APPOINTMENT (OUTPATIENT)
Dept: ANTEPARTUM | Facility: CLINIC | Age: 34
End: 2019-04-04

## 2019-04-04 ENCOUNTER — NON-APPOINTMENT (OUTPATIENT)
Age: 34
End: 2019-04-04

## 2019-04-04 VITALS
TEMPERATURE: 98.3 F | SYSTOLIC BLOOD PRESSURE: 120 MMHG | BODY MASS INDEX: 35.3 KG/M2 | HEART RATE: 97 BPM | OXYGEN SATURATION: 95 % | WEIGHT: 193 LBS | DIASTOLIC BLOOD PRESSURE: 57 MMHG

## 2019-04-04 DIAGNOSIS — O09.522 SUPERVISION OF ELDERLY MULTIGRAVIDA, SECOND TRIMESTER: ICD-10-CM

## 2019-04-04 DIAGNOSIS — O24.112 PRE-EXISTING TYPE 2 DIABETES MELLITUS, IN PREGNANCY, SECOND TRIMESTER: ICD-10-CM

## 2019-04-04 DIAGNOSIS — E10.9 TYPE 1 DIABETES MELLITUS WITHOUT COMPLICATIONS: ICD-10-CM

## 2019-04-04 DIAGNOSIS — O99.212 OBESITY COMPLICATING PREGNANCY, SECOND TRIMESTER: ICD-10-CM

## 2019-04-04 DIAGNOSIS — O09.92 SUPERVISION OF HIGH RISK PREGNANCY, UNSPECIFIED, SECOND TRIMESTER: ICD-10-CM

## 2019-04-04 DIAGNOSIS — Z3A.23 23 WEEKS GESTATION OF PREGNANCY: ICD-10-CM

## 2019-04-04 LAB
BILIRUB UR QL STRIP: NEGATIVE
CLARITY UR: CLEAR
COLLECTION METHOD: NORMAL
FETAL HEART DESCRIPTION: NORMAL
FETAL HEART RATE (BPM): 150
FETAL MOVEMENT: PRESENT
GLUCOSE BLDC GLUCOMTR-MCNC: 181 MG/DL — HIGH (ref 70–99)
GLUCOSE UR-MCNC: 1000
HCG UR QL: 0.2 EU/DL
HGB UR QL STRIP.AUTO: NEGATIVE
KETONES UR-MCNC: NEGATIVE
LEUKOCYTE ESTERASE UR QL STRIP: NORMAL
NITRITE UR QL STRIP: NEGATIVE
PH UR STRIP: 6
PROT UR STRIP-MCNC: NEGATIVE
SP GR UR STRIP: 1.02
URINE ALBUMIN/PROTEIN: NEGATIVE
URINE GLUCOSE: 1000
URINE KETONES: NEGATIVE

## 2019-04-11 ENCOUNTER — APPOINTMENT (OUTPATIENT)
Dept: ANTEPARTUM | Facility: CLINIC | Age: 34
End: 2019-04-11

## 2019-04-26 ENCOUNTER — APPOINTMENT (OUTPATIENT)
Dept: ANTEPARTUM | Facility: CLINIC | Age: 34
End: 2019-04-26

## 2019-05-09 ENCOUNTER — OUTPATIENT (OUTPATIENT)
Dept: OUTPATIENT SERVICES | Facility: HOSPITAL | Age: 34
LOS: 1 days | Discharge: HOME | End: 2019-05-09

## 2019-05-09 ENCOUNTER — NON-APPOINTMENT (OUTPATIENT)
Age: 34
End: 2019-05-09

## 2019-05-09 ENCOUNTER — APPOINTMENT (OUTPATIENT)
Dept: ANTEPARTUM | Facility: CLINIC | Age: 34
End: 2019-05-09
Payer: MEDICAID

## 2019-05-09 VITALS
WEIGHT: 196 LBS | HEART RATE: 99 BPM | TEMPERATURE: 98.3 F | DIASTOLIC BLOOD PRESSURE: 67 MMHG | BODY MASS INDEX: 35.85 KG/M2 | OXYGEN SATURATION: 97 % | SYSTOLIC BLOOD PRESSURE: 123 MMHG

## 2019-05-09 DIAGNOSIS — O24.313 UNSPECIFIED PRE-EXISTING DIABETES MELLITUS IN PREGNANCY, THIRD TRIMESTER: ICD-10-CM

## 2019-05-09 DIAGNOSIS — E10.9 TYPE 1 DIABETES MELLITUS WITHOUT COMPLICATIONS: ICD-10-CM

## 2019-05-09 LAB
BILIRUB UR QL STRIP: NEGATIVE
CLARITY UR: CLEAR
COLLECTION METHOD: NORMAL
FETAL HEART DESCRIPTION: NORMAL
FETAL HEART RATE (BPM): 154
FETAL MOVEMENT: PRESENT
GLUCOSE BLDC GLUCOMTR-MCNC: 194 MG/DL — HIGH (ref 70–99)
GLUCOSE UR-MCNC: 1000
HCG UR QL: 0.2 EU/DL
HGB UR QL STRIP.AUTO: NEGATIVE
KETONES UR-MCNC: NORMAL
LEUKOCYTE ESTERASE UR QL STRIP: NEGATIVE
NITRITE UR QL STRIP: NEGATIVE
PH UR STRIP: 6.5
PROT UR STRIP-MCNC: NORMAL
SCHEDULED VISIT: YES
SP GR UR STRIP: 1.01
URINE ALBUMIN/PROTEIN: NORMAL
URINE GLUCOSE: 1000
URINE KETONES: NORMAL
WEEKS GESTATION: 28.1

## 2019-05-09 PROCEDURE — 99214 OFFICE O/P EST MOD 30 MIN: CPT | Mod: 25

## 2019-05-09 PROCEDURE — 76819 FETAL BIOPHYS PROFIL W/O NST: CPT | Mod: 26

## 2019-05-16 ENCOUNTER — APPOINTMENT (OUTPATIENT)
Dept: ANTEPARTUM | Facility: CLINIC | Age: 34
End: 2019-05-16

## 2019-05-23 ENCOUNTER — RESULT CHARGE (OUTPATIENT)
Age: 34
End: 2019-05-23

## 2019-05-23 ENCOUNTER — APPOINTMENT (OUTPATIENT)
Dept: ANTEPARTUM | Facility: CLINIC | Age: 34
End: 2019-05-23
Payer: MEDICAID

## 2019-05-23 ENCOUNTER — NON-APPOINTMENT (OUTPATIENT)
Age: 34
End: 2019-05-23

## 2019-05-23 ENCOUNTER — OUTPATIENT (OUTPATIENT)
Dept: OUTPATIENT SERVICES | Facility: HOSPITAL | Age: 34
LOS: 1 days | Discharge: HOME | End: 2019-05-23

## 2019-05-23 VITALS
DIASTOLIC BLOOD PRESSURE: 62 MMHG | SYSTOLIC BLOOD PRESSURE: 114 MMHG | TEMPERATURE: 98 F | WEIGHT: 200 LBS | HEART RATE: 97 BPM | OXYGEN SATURATION: 96 % | BODY MASS INDEX: 36.58 KG/M2

## 2019-05-23 LAB
BILIRUB UR QL STRIP: NEGATIVE
CLARITY UR: CLEAR
COLLECTION METHOD: NORMAL
GLUCOSE BLDC GLUCOMTR-MCNC: 164
GLUCOSE BLDC GLUCOMTR-MCNC: 164 MG/DL — HIGH (ref 70–99)
GLUCOSE UR-MCNC: 1000
HCG UR QL: 0.2 EU/DL
HGB UR QL STRIP.AUTO: NEGATIVE
KETONES UR-MCNC: NEGATIVE
LEUKOCYTE ESTERASE UR QL STRIP: NORMAL
NITRITE UR QL STRIP: NEGATIVE
PH UR STRIP: 6.5
PROT UR STRIP-MCNC: NORMAL
SP GR UR STRIP: 1.01
URINE ALBUMIN/PROTEIN: NORMAL
URINE GLUCOSE: 1000
URINE KETONES: NEGATIVE

## 2019-05-23 PROCEDURE — 76816 OB US FOLLOW-UP PER FETUS: CPT | Mod: 26

## 2019-05-23 PROCEDURE — 99214 OFFICE O/P EST MOD 30 MIN: CPT | Mod: 25

## 2019-05-23 PROCEDURE — 76819 FETAL BIOPHYS PROFIL W/O NST: CPT | Mod: 26

## 2019-05-23 RX ORDER — CLOTRIMAZOLE 1 %
1 CREAM WITH APPLICATOR VAGINAL
Qty: 1 | Refills: 0 | Status: ACTIVE | COMMUNITY
Start: 2019-05-23 | End: 1900-01-01

## 2019-05-24 ENCOUNTER — RECORD ABSTRACTING (OUTPATIENT)
Age: 34
End: 2019-05-24

## 2019-05-24 DIAGNOSIS — O40.9XX0 POLYHYDRAMNIOS, UNSPECIFIED TRIMESTER, NOT APPLICABLE OR UNSPECIFIED: ICD-10-CM

## 2019-05-24 DIAGNOSIS — B37.3 CANDIDIASIS OF VULVA AND VAGINA: ICD-10-CM

## 2019-05-24 DIAGNOSIS — O24.313 UNSPECIFIED PRE-EXISTING DIABETES MELLITUS IN PREGNANCY, THIRD TRIMESTER: ICD-10-CM

## 2019-05-30 ENCOUNTER — APPOINTMENT (OUTPATIENT)
Dept: ANTEPARTUM | Facility: CLINIC | Age: 34
End: 2019-05-30

## 2019-05-30 LAB
A VAGINAE DNA VAG QL NAA+PROBE: NORMAL
BVAB2 DNA VAG QL NAA+PROBE: NORMAL
C KRUSEI DNA VAG QL NAA+PROBE: NEGATIVE
C KRUSEI DNA VAG QL NAA+PROBE: POSITIVE
C TRACH RRNA SPEC QL NAA+PROBE: NEGATIVE
MEGA1 DNA VAG QL NAA+PROBE: NORMAL
N GONORRHOEA RRNA SPEC QL NAA+PROBE: NEGATIVE
T VAGINALIS RRNA SPEC QL NAA+PROBE: NEGATIVE

## 2019-06-03 ENCOUNTER — OUTPATIENT (OUTPATIENT)
Dept: OUTPATIENT SERVICES | Facility: HOSPITAL | Age: 34
LOS: 1 days | Discharge: HOME | End: 2019-06-03

## 2019-06-03 DIAGNOSIS — O09.92 SUPERVISION OF HIGH RISK PREGNANCY, UNSPECIFIED, SECOND TRIMESTER: ICD-10-CM

## 2019-06-04 LAB
ESTIMATED AVERAGE GLUCOSE: 197 MG/DL
HBA1C MFR BLD HPLC: 8.5 %

## 2019-06-05 LAB
MEV IGG FLD QL IA: 66.6 AU/ML
MEV IGG+IGM SER-IMP: POSITIVE

## 2019-06-06 ENCOUNTER — OUTPATIENT (OUTPATIENT)
Dept: OUTPATIENT SERVICES | Facility: HOSPITAL | Age: 34
LOS: 1 days | Discharge: HOME | End: 2019-06-06

## 2019-06-06 ENCOUNTER — NON-APPOINTMENT (OUTPATIENT)
Age: 34
End: 2019-06-06

## 2019-06-06 ENCOUNTER — APPOINTMENT (OUTPATIENT)
Dept: ANTEPARTUM | Facility: CLINIC | Age: 34
End: 2019-06-06
Payer: MEDICAID

## 2019-06-06 ENCOUNTER — APPOINTMENT (OUTPATIENT)
Dept: OBGYN | Facility: CLINIC | Age: 34
End: 2019-06-06

## 2019-06-06 VITALS
DIASTOLIC BLOOD PRESSURE: 57 MMHG | TEMPERATURE: 97.6 F | OXYGEN SATURATION: 100 % | BODY MASS INDEX: 35.85 KG/M2 | WEIGHT: 196 LBS | HEART RATE: 98 BPM | SYSTOLIC BLOOD PRESSURE: 115 MMHG

## 2019-06-06 DIAGNOSIS — E10.9 TYPE 1 DIABETES MELLITUS WITHOUT COMPLICATIONS: ICD-10-CM

## 2019-06-06 LAB
BILIRUB UR QL STRIP: NEGATIVE
CLARITY UR: CLEAR
COLLECTION METHOD: NORMAL
FETAL HEART DESCRIPTION: NORMAL
FETAL HEART RATE (BPM): 136
FETAL MOVEMENT: PRESENT
GLUCOSE BLDC GLUCOMTR-MCNC: 126
GLUCOSE BLDC GLUCOMTR-MCNC: 126 MG/DL — HIGH (ref 70–99)
GLUCOSE UR-MCNC: 2000
HCG UR QL: 0.2 EU/DL
HGB UR QL STRIP.AUTO: NEGATIVE
KETONES UR-MCNC: NEGATIVE
LEUKOCYTE ESTERASE UR QL STRIP: NEGATIVE
NITRITE UR QL STRIP: NEGATIVE
PH UR STRIP: 6
PROT UR STRIP-MCNC: NEGATIVE
SCHEDULED VISIT: YES
SP GR UR STRIP: 1.03
URINE ALBUMIN/PROTEIN: NEGATIVE
URINE GLUCOSE: 2000
URINE KETONES: NEGATIVE
WEEKS GESTATION: 32.1

## 2019-06-06 PROCEDURE — 99214 OFFICE O/P EST MOD 30 MIN: CPT | Mod: 25

## 2019-06-06 PROCEDURE — 76818 FETAL BIOPHYS PROFILE W/NST: CPT | Mod: 26

## 2019-06-13 ENCOUNTER — APPOINTMENT (OUTPATIENT)
Dept: ANTEPARTUM | Facility: CLINIC | Age: 34
End: 2019-06-13
Payer: MEDICAID

## 2019-06-13 ENCOUNTER — RESULT CHARGE (OUTPATIENT)
Age: 34
End: 2019-06-13

## 2019-06-13 ENCOUNTER — OUTPATIENT (OUTPATIENT)
Dept: OUTPATIENT SERVICES | Facility: HOSPITAL | Age: 34
LOS: 1 days | Discharge: HOME | End: 2019-06-13

## 2019-06-13 ENCOUNTER — NON-APPOINTMENT (OUTPATIENT)
Age: 34
End: 2019-06-13

## 2019-06-13 VITALS
BODY MASS INDEX: 36.58 KG/M2 | SYSTOLIC BLOOD PRESSURE: 133 MMHG | DIASTOLIC BLOOD PRESSURE: 76 MMHG | HEART RATE: 92 BPM | WEIGHT: 200 LBS | TEMPERATURE: 97.8 F | OXYGEN SATURATION: 100 %

## 2019-06-13 LAB
BILIRUB UR QL STRIP: NEGATIVE
CLARITY UR: NORMAL
COLLECTION METHOD: NORMAL
FETAL HEART RATE (BPM): 142
FETAL MOVEMENT: PRESENT
GLUCOSE BLDC GLUCOMTR-MCNC: 125
GLUCOSE BLDC GLUCOMTR-MCNC: 125 MG/DL — HIGH (ref 70–99)
GLUCOSE UR-MCNC: 250
HCG UR QL: 0.2 EU/DL
HGB UR QL STRIP.AUTO: NEGATIVE
KETONES UR-MCNC: NEGATIVE
LEUKOCYTE ESTERASE UR QL STRIP: NORMAL
NITRITE UR QL STRIP: NEGATIVE
OB COMMENTS: NORMAL
PH UR STRIP: 6
PROT UR STRIP-MCNC: NEGATIVE
SCHEDULED VISIT: YES
SP GR UR STRIP: 1.01
URINE ALBUMIN/PROTEIN: NEGATIVE
URINE GLUCOSE: 250
URINE KETONES: NEGATIVE
WEEKS GESTATION: 33.1

## 2019-06-13 PROCEDURE — 76819 FETAL BIOPHYS PROFIL W/O NST: CPT | Mod: 26

## 2019-06-13 PROCEDURE — ZZZZZ: CPT

## 2019-06-13 PROCEDURE — 99214 OFFICE O/P EST MOD 30 MIN: CPT | Mod: 25

## 2019-06-17 ENCOUNTER — APPOINTMENT (OUTPATIENT)
Dept: ANTEPARTUM | Facility: CLINIC | Age: 34
End: 2019-06-17
Payer: MEDICAID

## 2019-06-17 ENCOUNTER — OUTPATIENT (OUTPATIENT)
Dept: OUTPATIENT SERVICES | Facility: HOSPITAL | Age: 34
LOS: 1 days | Discharge: HOME | End: 2019-06-17

## 2019-06-17 PROCEDURE — ZZZZZ: CPT

## 2019-06-17 PROCEDURE — 76818 FETAL BIOPHYS PROFILE W/NST: CPT | Mod: 26

## 2019-06-20 ENCOUNTER — APPOINTMENT (OUTPATIENT)
Dept: ANTEPARTUM | Facility: CLINIC | Age: 34
End: 2019-06-20

## 2019-06-24 ENCOUNTER — APPOINTMENT (OUTPATIENT)
Dept: ANTEPARTUM | Facility: CLINIC | Age: 34
End: 2019-06-24

## 2019-06-24 ENCOUNTER — RESULT CHARGE (OUTPATIENT)
Age: 34
End: 2019-06-24

## 2019-06-24 ENCOUNTER — OUTPATIENT (OUTPATIENT)
Dept: OUTPATIENT SERVICES | Facility: HOSPITAL | Age: 34
LOS: 1 days | Discharge: HOME | End: 2019-06-24

## 2019-06-24 ENCOUNTER — APPOINTMENT (OUTPATIENT)
Dept: ANTEPARTUM | Facility: CLINIC | Age: 34
End: 2019-06-24
Payer: MEDICAID

## 2019-06-24 ENCOUNTER — CHART COPY (OUTPATIENT)
Age: 34
End: 2019-06-24

## 2019-06-24 ENCOUNTER — NON-APPOINTMENT (OUTPATIENT)
Age: 34
End: 2019-06-24

## 2019-06-24 ENCOUNTER — OUTPATIENT (OUTPATIENT)
Dept: OUTPATIENT SERVICES | Facility: HOSPITAL | Age: 34
LOS: 1 days | Discharge: HOME | End: 2019-06-24
Payer: MEDICAID

## 2019-06-24 VITALS — DIASTOLIC BLOOD PRESSURE: 62 MMHG | HEART RATE: 102 BPM | SYSTOLIC BLOOD PRESSURE: 116 MMHG

## 2019-06-24 VITALS
OXYGEN SATURATION: 99 % | DIASTOLIC BLOOD PRESSURE: 60 MMHG | TEMPERATURE: 98.5 F | SYSTOLIC BLOOD PRESSURE: 119 MMHG | BODY MASS INDEX: 36.58 KG/M2 | WEIGHT: 200 LBS | HEART RATE: 98 BPM

## 2019-06-24 VITALS
RESPIRATION RATE: 18 BRPM | HEART RATE: 102 BPM | TEMPERATURE: 99 F | SYSTOLIC BLOOD PRESSURE: 116 MMHG | DIASTOLIC BLOOD PRESSURE: 62 MMHG

## 2019-06-24 DIAGNOSIS — Z98.890 OTHER SPECIFIED POSTPROCEDURAL STATES: Chronic | ICD-10-CM

## 2019-06-24 DIAGNOSIS — O24.913 UNSPECIFIED DIABETES MELLITUS IN PREGNANCY, THIRD TRIMESTER: ICD-10-CM

## 2019-06-24 LAB
BILIRUB UR QL STRIP: NEGATIVE
CLARITY UR: NORMAL
COLLECTION METHOD: NORMAL
FETAL HEART RATE (BPM): 140
FETAL MOVEMENT: PRESENT
GLUCOSE BLDC GLUCOMTR-MCNC: 118 MG/DL — HIGH (ref 70–99)
GLUCOSE BLDC GLUCOMTR-MCNC: 187
GLUCOSE BLDC GLUCOMTR-MCNC: 187 MG/DL — HIGH (ref 70–99)
GLUCOSE UR-MCNC: 2000
HCG UR QL: 0.2 EU/DL
HGB UR QL STRIP.AUTO: NORMAL
KETONES UR-MCNC: NORMAL
LEUKOCYTE ESTERASE UR QL STRIP: NORMAL
NITRITE UR QL STRIP: NEGATIVE
OB COMMENTS: NORMAL
PH UR STRIP: 6.5
PROT UR STRIP-MCNC: 1
SCHEDULED VISIT: YES
SP GR UR STRIP: 1.02
URINE ALBUMIN/PROTEIN: 1
URINE GLUCOSE: 2000
URINE KETONES: NORMAL
WEEKS GESTATION: 34.5

## 2019-06-24 PROCEDURE — 59025 FETAL NON-STRESS TEST: CPT | Mod: 26

## 2019-06-24 PROCEDURE — 99214 OFFICE O/P EST MOD 30 MIN: CPT | Mod: 25

## 2019-06-24 PROCEDURE — 76815 OB US LIMITED FETUS(S): CPT | Mod: 26

## 2019-06-24 PROCEDURE — 76816 OB US FOLLOW-UP PER FETUS: CPT | Mod: 26

## 2019-06-24 PROCEDURE — 76818 FETAL BIOPHYS PROFILE W/NST: CPT | Mod: 26

## 2019-06-24 RX ORDER — TERCONAZOLE 4 MG/G
0.4 CREAM VAGINAL
Qty: 1 | Refills: 1 | Status: ACTIVE | COMMUNITY
Start: 2019-06-24 | End: 1900-01-01

## 2019-06-24 NOTE — OB PROVIDER TRIAGE NOTE - HISTORY OF PRESENT ILLNESS
Patient is a 31 yo  at 34w5d DEBBY 2019 by kale presenting to L&D from [facility] risk clinic with complaints of "feeling wet in her panties" for the past 2 days.  She is complaining of intermittent abdominal tightening for the last 2 days.  She complains of vaginal itching that has lasted for "months".  She denies any vaginal bleeding or contractions.  She endorses good fetal movement.  Patient is a T2DM on insulin Novalog 28 TID and NPH 28 AM/40 PM and fasting and postprandial FS ranges 100-200 (patient does not have blood sugar log with her).  Today her FS was 118.

## 2019-06-24 NOTE — OB PROVIDER TRIAGE NOTE - NSOBPROVIDERNOTE_OBGYN_ALL_OB_FT
31 yo  at 34w5d with T1DM, poorly controlled on insulin, intact membranes, not in labor with reactive NST and BPP .   -discussed options of admission for better glycemic control, patient refuses at this time  -patient had GBS and vaginitis panel done at high risk clinic today  -Terazol 0.4% for 7 days  -fetal kick count  -labor precautions  -encourage PO fluids   -encourage blood sugar log   -follow up at next appointment    Dr. Reese and Dr. Segura aware

## 2019-06-24 NOTE — OB PROVIDER TRIAGE NOTE - ADDITIONAL INSTRUCTIONS
Follow up at high risk clinic.  In case of fever, chills, chest pain, shortness of breath, loss of fluid or vaginal bleeding go to the ED.

## 2019-06-24 NOTE — OB PROVIDER TRIAGE NOTE - NSHPPHYSICALEXAM_GEN_ALL_CORE
Vital Signs Last 24 Hrs  T(C): 37.1 (24 Jun 2019 14:50), Max: 37.1 (24 Jun 2019 14:50)  T(F): 98.8 (24 Jun 2019 14:50), Max: 98.8 (24 Jun 2019 14:50)  HR: 102 (24 Jun 2019 14:50) (102 - 102)  BP: 116/62 (24 Jun 2019 14:50) (116/62 - 116/62)  RR: 18 (24 Jun 2019 14:50) (18 - 18)       Gen: NAD, A&Ox3  Abd: Gravid, soft, NT, palpable ctx  SVE: c/l/p, vtx, intact, copious white discharge  EFM: 120s/mod/+accels  [place]: mild irregular  Speculum: negative pooling, ferning and nitrazine

## 2019-06-24 NOTE — OB PROVIDER TRIAGE NOTE - NS_OBGYNHISTORY_OBGYN_ALL_OB_FT
Ob: 2007 , 38w, 6.5lb, no complications   14 , 38 w, 7.14lb, no complications    Gyn: denies any fibroids, cysts, STDs, abnormal paps

## 2019-06-26 ENCOUNTER — OTHER (OUTPATIENT)
Age: 34
End: 2019-06-26

## 2019-06-26 LAB
GP B STREP DNA SPEC QL NAA+PROBE: DETECTED
GP B STREP DNA SPEC QL NAA+PROBE: NORMAL
SOURCE GBS: NORMAL

## 2019-06-27 ENCOUNTER — RESULT CHARGE (OUTPATIENT)
Age: 34
End: 2019-06-27

## 2019-06-27 ENCOUNTER — APPOINTMENT (OUTPATIENT)
Dept: ANTEPARTUM | Facility: CLINIC | Age: 34
End: 2019-06-27
Payer: MEDICAID

## 2019-06-27 ENCOUNTER — NON-APPOINTMENT (OUTPATIENT)
Age: 34
End: 2019-06-27

## 2019-06-27 ENCOUNTER — OUTPATIENT (OUTPATIENT)
Dept: OUTPATIENT SERVICES | Facility: HOSPITAL | Age: 34
LOS: 1 days | Discharge: HOME | End: 2019-06-27

## 2019-06-27 VITALS
TEMPERATURE: 98 F | HEART RATE: 94 BPM | BODY MASS INDEX: 37 KG/M2 | DIASTOLIC BLOOD PRESSURE: 67 MMHG | OXYGEN SATURATION: 100 % | SYSTOLIC BLOOD PRESSURE: 125 MMHG | WEIGHT: 202.31 LBS

## 2019-06-27 DIAGNOSIS — Z98.890 OTHER SPECIFIED POSTPROCEDURAL STATES: Chronic | ICD-10-CM

## 2019-06-27 LAB
BILIRUB UR QL STRIP: NORMAL
CLARITY UR: CLEAR
COLLECTION METHOD: NORMAL
FETAL MOVEMENT: PRESENT
GLUCOSE BLDC GLUCOMTR-MCNC: 142
GLUCOSE BLDC GLUCOMTR-MCNC: 142 MG/DL — HIGH (ref 70–99)
GLUCOSE UR-MCNC: 1000
HCG UR QL: 0.2 EU/DL
HGB UR QL STRIP.AUTO: NORMAL
KETONES UR-MCNC: NORMAL
LEUKOCYTE ESTERASE UR QL STRIP: NORMAL
NITRITE UR QL STRIP: NEGATIVE
OB COMMENTS: NORMAL
PH UR STRIP: 6.5
PROT UR STRIP-MCNC: NORMAL
SCHEDULED VISIT: YES
SP GR UR STRIP: 1.02
URINE ALBUMIN/PROTEIN: NORMAL
URINE GLUCOSE: NORMAL
WEEKS GESTATION: 35.1

## 2019-06-27 PROCEDURE — 99214 OFFICE O/P EST MOD 30 MIN: CPT | Mod: 25

## 2019-06-27 PROCEDURE — ZZZZZ: CPT

## 2019-06-27 PROCEDURE — 76818 FETAL BIOPHYS PROFILE W/NST: CPT | Mod: 26

## 2019-07-01 ENCOUNTER — OUTPATIENT (OUTPATIENT)
Dept: OUTPATIENT SERVICES | Facility: HOSPITAL | Age: 34
LOS: 1 days | End: 2019-07-01
Payer: MEDICAID

## 2019-07-01 ENCOUNTER — OUTPATIENT (OUTPATIENT)
Dept: OUTPATIENT SERVICES | Facility: HOSPITAL | Age: 34
LOS: 1 days | Discharge: HOME | End: 2019-07-01

## 2019-07-01 ENCOUNTER — APPOINTMENT (OUTPATIENT)
Dept: ANTEPARTUM | Facility: CLINIC | Age: 34
End: 2019-07-01
Payer: MEDICAID

## 2019-07-01 DIAGNOSIS — E10.9 TYPE 1 DIABETES MELLITUS WITHOUT COMPLICATIONS: ICD-10-CM

## 2019-07-01 DIAGNOSIS — Z98.890 OTHER SPECIFIED POSTPROCEDURAL STATES: Chronic | ICD-10-CM

## 2019-07-01 LAB
A VAGINAE DNA VAG QL NAA+PROBE: NORMAL
BVAB2 DNA VAG QL NAA+PROBE: NORMAL
C KRUSEI DNA VAG QL NAA+PROBE: NEGATIVE
C KRUSEI DNA VAG QL NAA+PROBE: POSITIVE
C KRUSEI DNA VAG QL NAA+PROBE: POSITIVE
C TRACH RRNA SPEC QL NAA+PROBE: NEGATIVE
MEGA1 DNA VAG QL NAA+PROBE: NORMAL
N GONORRHOEA RRNA SPEC QL NAA+PROBE: NEGATIVE
T VAGINALIS RRNA SPEC QL NAA+PROBE: NEGATIVE

## 2019-07-01 PROCEDURE — 76818 FETAL BIOPHYS PROFILE W/NST: CPT | Mod: 26

## 2019-07-01 PROCEDURE — G9001: CPT

## 2019-07-01 PROCEDURE — ZZZZZ: CPT

## 2019-07-02 RX ORDER — CLOTRIMAZOLE 10 MG/G
1 CREAM VAGINAL
Qty: 1 | Refills: 0 | Status: ACTIVE | COMMUNITY
Start: 2019-07-02 | End: 1900-01-01

## 2019-07-03 ENCOUNTER — APPOINTMENT (OUTPATIENT)
Dept: ANTEPARTUM | Facility: CLINIC | Age: 34
End: 2019-07-03
Payer: MEDICAID

## 2019-07-03 ENCOUNTER — RESULT CHARGE (OUTPATIENT)
Age: 34
End: 2019-07-03

## 2019-07-03 ENCOUNTER — NON-APPOINTMENT (OUTPATIENT)
Age: 34
End: 2019-07-03

## 2019-07-03 ENCOUNTER — OUTPATIENT (OUTPATIENT)
Dept: OUTPATIENT SERVICES | Facility: HOSPITAL | Age: 34
LOS: 1 days | Discharge: HOME | End: 2019-07-03

## 2019-07-03 VITALS
WEIGHT: 203 LBS | SYSTOLIC BLOOD PRESSURE: 120 MMHG | TEMPERATURE: 97.7 F | BODY MASS INDEX: 37.13 KG/M2 | DIASTOLIC BLOOD PRESSURE: 74 MMHG | HEART RATE: 91 BPM | OXYGEN SATURATION: 100 %

## 2019-07-03 DIAGNOSIS — Z86.19 PERSONAL HISTORY OF OTHER INFECTIOUS AND PARASITIC DISEASES: ICD-10-CM

## 2019-07-03 DIAGNOSIS — O09.92 SUPERVISION OF HIGH RISK PREGNANCY, UNSPECIFIED, SECOND TRIMESTER: ICD-10-CM

## 2019-07-03 DIAGNOSIS — Z98.890 OTHER SPECIFIED POSTPROCEDURAL STATES: Chronic | ICD-10-CM

## 2019-07-03 DIAGNOSIS — O09.519 SUPERVISION OF ELDERLY PRIMIGRAVIDA, UNSPECIFIED TRIMESTER: ICD-10-CM

## 2019-07-03 DIAGNOSIS — O24.913 UNSPECIFIED DIABETES MELLITUS IN PREGNANCY, THIRD TRIMESTER: ICD-10-CM

## 2019-07-03 DIAGNOSIS — O99.212 OBESITY COMPLICATING PREGNANCY, SECOND TRIMESTER: ICD-10-CM

## 2019-07-03 DIAGNOSIS — Z22.330 CARRIER OF GROUP B STREPTOCOCCUS: ICD-10-CM

## 2019-07-03 DIAGNOSIS — O09.93 SUPERVISION OF HIGH RISK PREGNANCY, UNSPECIFIED, THIRD TRIMESTER: ICD-10-CM

## 2019-07-03 LAB
BILIRUB UR QL STRIP: NEGATIVE
CLARITY UR: CLEAR
COLLECTION METHOD: NORMAL
FETAL HEART DESCRIPTION: NORMAL
FETAL HEART RATE (BPM): 152
FETAL MOVEMENT: PRESENT
GLUCOSE BLDC GLUCOMTR-MCNC: 74
GLUCOSE BLDC GLUCOMTR-MCNC: 74 MG/DL — SIGNIFICANT CHANGE UP (ref 70–99)
GLUCOSE UR-MCNC: NEGATIVE
HCG UR QL: 0.2 EU/DL
HGB UR QL STRIP.AUTO: NEGATIVE
KETONES UR-MCNC: NEGATIVE
LEUKOCYTE ESTERASE UR QL STRIP: NORMAL
NITRITE UR QL STRIP: NEGATIVE
OB COMMENTS: NORMAL
PH UR STRIP: 7.5
PROT UR STRIP-MCNC: NORMAL
SCHEDULED VISIT: YES
SP GR UR STRIP: 1.02
URINE ALBUMIN/PROTEIN: NORMAL
URINE GLUCOSE: NEGATIVE
URINE KETONES: NEGATIVE

## 2019-07-03 PROCEDURE — ZZZZZ: CPT

## 2019-07-03 PROCEDURE — 76818 FETAL BIOPHYS PROFILE W/NST: CPT | Mod: 26

## 2019-07-03 PROCEDURE — 99213 OFFICE O/P EST LOW 20 MIN: CPT | Mod: 25

## 2019-07-05 ENCOUNTER — OUTPATIENT (OUTPATIENT)
Dept: OUTPATIENT SERVICES | Facility: HOSPITAL | Age: 34
LOS: 1 days | Discharge: HOME | End: 2019-07-05
Payer: MEDICAID

## 2019-07-05 VITALS
DIASTOLIC BLOOD PRESSURE: 74 MMHG | HEART RATE: 103 BPM | SYSTOLIC BLOOD PRESSURE: 128 MMHG | RESPIRATION RATE: 18 BRPM | TEMPERATURE: 99 F

## 2019-07-05 VITALS — DIASTOLIC BLOOD PRESSURE: 74 MMHG | HEART RATE: 103 BPM | SYSTOLIC BLOOD PRESSURE: 128 MMHG

## 2019-07-05 DIAGNOSIS — Z98.890 OTHER SPECIFIED POSTPROCEDURAL STATES: Chronic | ICD-10-CM

## 2019-07-05 LAB — GLUCOSE BLDC GLUCOMTR-MCNC: 172 MG/DL — HIGH (ref 70–99)

## 2019-07-05 PROCEDURE — 76815 OB US LIMITED FETUS(S): CPT | Mod: 26

## 2019-07-05 PROCEDURE — 99213 OFFICE O/P EST LOW 20 MIN: CPT | Mod: 25

## 2019-07-05 PROCEDURE — 59025 FETAL NON-STRESS TEST: CPT | Mod: 26

## 2019-07-05 NOTE — OB PROVIDER TRIAGE NOTE - NS_OBGYNHISTORY_OBGYN_ALL_OB_FT
ObHx: NSVDx2, ft, largest baby [id number]. Complicated by T2DM  GynHx: ASCUS and HPV HR pos, 16/18/45 neg, colpo scheduled postpartum. Denies hx of STDs, fibroids, ovarian cysts.

## 2019-07-05 NOTE — OB PROVIDER TRIAGE NOTE - NSHPPHYSICALEXAM_GEN_ALL_CORE
Vital Signs  T(F): 98.8 (05 Jul 2019 19:29), Max: 98.8 (05 Jul 2019 19:29)  HR: 103 (05 Jul 2019 19:29) (103 - 103)  BP: 128/74 (05 Jul 2019 19:29) (128/74 - 128/74)  RR: 18 (05 Jul 2019 19:29) (18 - 18)    Udip: glucose greater than or equal to 1000, bilirubin mod, ketones 40, nitrites neg, leukocytes mod    EFM: 145/mod/+accels  [place]: irregular   SVE:  Abd: soft, nontender. Gravid uterus, no palpable contractions Vital Signs  T(F): 98.8 (05 Jul 2019 19:29), Max: 98.8 (05 Jul 2019 19:29)  HR: 103 (05 Jul 2019 19:29) (103 - 103)  BP: 128/74 (05 Jul 2019 19:29) (128/74 - 128/74)  RR: 18 (05 Jul 2019 19:29) (18 - 18)    Udip: glucose greater than or equal to 1000, bilirubin mod, ketones 40, nitrites neg, leukocytes mod    EFM: 145/mod/+accels  [place]: irregular   SVE: closed/l/p  Abd: soft, nontender. Gravid uterus, no palpable contractions Vital Signs  T(F): 98.8 (05 Jul 2019 19:29), Max: 98.8 (05 Jul 2019 19:29)  HR: 103 (05 Jul 2019 19:29) (103 - 103)  BP: 128/74 (05 Jul 2019 19:29) (128/74 - 128/74)  RR: 18 (05 Jul 2019 19:29) (18 - 18)    Udip: glucose greater than or equal to 1000, bilirubin mod, ketones 40, nitrites neg, leukocytes mod    EFM: 145/mod/+accels  [place]: irregular   SVE: closed/l/p  Abd: soft, nontender. Gravid uterus, no palpable contractions    Bedside sono: cephalic, ant placenta, FHR 135bpm, MVP 5.7cm, BPP 8/8

## 2019-07-05 NOTE — OB PROVIDER TRIAGE NOTE - ADDITIONAL INSTRUCTIONS
Followup with your doctor as scheduled. If you have any leaking fluid or vaginal bleeding, or if you don't feel the baby move as much, call the doctor or return to labor and delivery.

## 2019-07-05 NOTE — OB PROVIDER TRIAGE NOTE - NSOBPROVIDERNOTE_OBGYN_ALL_OB_FT
A/P  33yo  at 36w2d by LMP 10/24/18, presenting to triage with contractions q2-4mins.    Dr. Lovell and Dr. Cruz aware. A/P  35yo  at 36w2d by LMP 10/24/18, PMH of T2DM, GBS positive, presenting to triage with contractions q2-4mins. /mod/+accels, [place] irregular ctx, C/l/p on exam, BPP 8/.    - d/c home       Dr. Lovell and Dr. Cruz aware. A/P  35yo  at 36w2d by LMP 10/24/18, PMH of T2DM, GBS positive, presenting to triage with contractions q2-4mins. /mod/+accels, [place] irregular ctx, C/l/p on exam, BPP 8/8.    - d/c home, instructions and precautions given  - follow up with MFM as scheduled next week       Dr. Lovell and Dr. Cruz aware.

## 2019-07-05 NOTE — OB PROVIDER TRIAGE NOTE - NSHPLABSRESULTS_GEN_ALL_CORE
GBS positive 6/24/19  HbA1C 8.5% 6/3/19  Measles immune 6/3/19  Varicella immune 12/21/18  hepB negative 12/21/18  HIV neg 12/21/18  HPVHR positive, HPV 16/18/45 neg on 12/18/18    sono 7/1/19 cephalic GBS positive 6/24/19  HbA1C 8.5% 6/3/19  Measles immune 6/3/19  Varicella immune 12/21/18  hepB negative 12/21/18  HIV neg 12/21/18  HPVHR positive, HPV 16/18/45 neg on 12/18/18    sono 7/1/19 cephalic, placenta ant, MVP 95mm, BPP 10/10  sono 6/17/19 cephalic, ant placenta, MVP 9.8cm, BPP 10/10  sono 6/13/19 cephalic, ant placenta, MVP 8.1cm, BPP 10/10  sono 6/6/19 cephalic, ant placenta, MVP 9.0cm, BPP 10/10  sono 5/23/19 cephalic, ant placenta, MVP 9.7cm  sono 5/9/19 cephalic, ant placenta, MVP 6.7cm, BPP 8/8

## 2019-07-08 ENCOUNTER — APPOINTMENT (OUTPATIENT)
Dept: ANTEPARTUM | Facility: CLINIC | Age: 34
End: 2019-07-08
Payer: MEDICAID

## 2019-07-08 ENCOUNTER — OUTPATIENT (OUTPATIENT)
Dept: OUTPATIENT SERVICES | Facility: HOSPITAL | Age: 34
LOS: 1 days | Discharge: HOME | End: 2019-07-08

## 2019-07-08 DIAGNOSIS — O09.519 SUPERVISION OF ELDERLY PRIMIGRAVIDA, UNSPECIFIED TRIMESTER: ICD-10-CM

## 2019-07-08 DIAGNOSIS — O24.313 UNSPECIFIED PRE-EXISTING DIABETES MELLITUS IN PREGNANCY, THIRD TRIMESTER: ICD-10-CM

## 2019-07-08 DIAGNOSIS — Z98.890 OTHER SPECIFIED POSTPROCEDURAL STATES: Chronic | ICD-10-CM

## 2019-07-08 DIAGNOSIS — O24.913 UNSPECIFIED DIABETES MELLITUS IN PREGNANCY, THIRD TRIMESTER: ICD-10-CM

## 2019-07-08 DIAGNOSIS — Z22.330 CARRIER OF GROUP B STREPTOCOCCUS: ICD-10-CM

## 2019-07-08 DIAGNOSIS — O40.9XX0 POLYHYDRAMNIOS, UNSPECIFIED TRIMESTER, NOT APPLICABLE OR UNSPECIFIED: ICD-10-CM

## 2019-07-08 DIAGNOSIS — O09.93 SUPERVISION OF HIGH RISK PREGNANCY, UNSPECIFIED, THIRD TRIMESTER: ICD-10-CM

## 2019-07-08 DIAGNOSIS — B37.3 CANDIDIASIS OF VULVA AND VAGINA: ICD-10-CM

## 2019-07-08 DIAGNOSIS — Z3A.35 35 WEEKS GESTATION OF PREGNANCY: ICD-10-CM

## 2019-07-08 PROCEDURE — 76818 FETAL BIOPHYS PROFILE W/NST: CPT | Mod: 26

## 2019-07-08 PROCEDURE — ZZZZZ: CPT

## 2019-07-09 DIAGNOSIS — Z3A.36 36 WEEKS GESTATION OF PREGNANCY: ICD-10-CM

## 2019-07-09 DIAGNOSIS — O40.9XX0 POLYHYDRAMNIOS, UNSPECIFIED TRIMESTER, NOT APPLICABLE OR UNSPECIFIED: ICD-10-CM

## 2019-07-09 DIAGNOSIS — O24.313 UNSPECIFIED PRE-EXISTING DIABETES MELLITUS IN PREGNANCY, THIRD TRIMESTER: ICD-10-CM

## 2019-07-10 ENCOUNTER — INPATIENT (INPATIENT)
Facility: HOSPITAL | Age: 34
LOS: 4 days | Discharge: HOME | End: 2019-07-15
Attending: OBSTETRICS & GYNECOLOGY | Admitting: OBSTETRICS & GYNECOLOGY
Payer: MEDICAID

## 2019-07-10 VITALS — SYSTOLIC BLOOD PRESSURE: 114 MMHG | DIASTOLIC BLOOD PRESSURE: 60 MMHG | HEART RATE: 101 BPM

## 2019-07-10 DIAGNOSIS — Z98.890 OTHER SPECIFIED POSTPROCEDURAL STATES: Chronic | ICD-10-CM

## 2019-07-10 LAB
APPEARANCE UR: ABNORMAL
BASOPHILS # BLD AUTO: 0.01 K/UL — SIGNIFICANT CHANGE UP (ref 0–0.2)
BASOPHILS NFR BLD AUTO: 0.1 % — SIGNIFICANT CHANGE UP (ref 0–1)
BILIRUB UR-MCNC: NEGATIVE — SIGNIFICANT CHANGE UP
COLOR SPEC: YELLOW — SIGNIFICANT CHANGE UP
DIFF PNL FLD: SIGNIFICANT CHANGE UP
EOSINOPHIL # BLD AUTO: 0.08 K/UL — SIGNIFICANT CHANGE UP (ref 0–0.7)
EOSINOPHIL NFR BLD AUTO: 0.9 % — SIGNIFICANT CHANGE UP (ref 0–8)
GLUCOSE BLDC GLUCOMTR-MCNC: 102 MG/DL — HIGH (ref 70–99)
GLUCOSE BLDC GLUCOMTR-MCNC: 104 MG/DL — HIGH (ref 70–99)
GLUCOSE BLDC GLUCOMTR-MCNC: 112 MG/DL — HIGH (ref 70–99)
GLUCOSE BLDC GLUCOMTR-MCNC: 122 MG/DL — HIGH (ref 70–99)
GLUCOSE BLDC GLUCOMTR-MCNC: 123 MG/DL — HIGH (ref 70–99)
GLUCOSE BLDC GLUCOMTR-MCNC: 167 MG/DL — HIGH (ref 70–99)
GLUCOSE BLDC GLUCOMTR-MCNC: 185 MG/DL — HIGH (ref 70–99)
GLUCOSE BLDC GLUCOMTR-MCNC: 202 MG/DL — HIGH (ref 70–99)
GLUCOSE BLDC GLUCOMTR-MCNC: 77 MG/DL — SIGNIFICANT CHANGE UP (ref 70–99)
GLUCOSE BLDC GLUCOMTR-MCNC: 89 MG/DL — SIGNIFICANT CHANGE UP (ref 70–99)
GLUCOSE UR QL: NEGATIVE — SIGNIFICANT CHANGE UP
HCT VFR BLD CALC: 37.6 % — SIGNIFICANT CHANGE UP (ref 37–47)
HGB BLD-MCNC: 11.8 G/DL — LOW (ref 12–16)
HIV 1 & 2 AB SERPL IA.RAPID: SIGNIFICANT CHANGE UP
IMM GRANULOCYTES NFR BLD AUTO: 1 % — HIGH (ref 0.1–0.3)
KETONES UR-MCNC: ABNORMAL
L&D DRUG SCREEN, URINE: SIGNIFICANT CHANGE UP
LEUKOCYTE ESTERASE UR-ACNC: ABNORMAL
LYMPHOCYTES # BLD AUTO: 1.73 K/UL — SIGNIFICANT CHANGE UP (ref 1.2–3.4)
LYMPHOCYTES # BLD AUTO: 19.3 % — LOW (ref 20.5–51.1)
MCHC RBC-ENTMCNC: 25.2 PG — LOW (ref 27–31)
MCHC RBC-ENTMCNC: 31.4 G/DL — LOW (ref 32–37)
MCV RBC AUTO: 80.2 FL — LOW (ref 81–99)
MONOCYTES # BLD AUTO: 0.86 K/UL — HIGH (ref 0.1–0.6)
MONOCYTES NFR BLD AUTO: 9.6 % — HIGH (ref 1.7–9.3)
NEUTROPHILS # BLD AUTO: 6.21 K/UL — SIGNIFICANT CHANGE UP (ref 1.4–6.5)
NEUTROPHILS NFR BLD AUTO: 69.1 % — SIGNIFICANT CHANGE UP (ref 42.2–75.2)
NITRITE UR-MCNC: NEGATIVE — SIGNIFICANT CHANGE UP
NRBC # BLD: 0 /100 WBCS — SIGNIFICANT CHANGE UP (ref 0–0)
PH UR: 6.5 — SIGNIFICANT CHANGE UP (ref 5–8)
PLATELET # BLD AUTO: 213 K/UL — SIGNIFICANT CHANGE UP (ref 130–400)
PRENATAL SYPHILIS TEST: SIGNIFICANT CHANGE UP
PROT UR-MCNC: ABNORMAL
RBC # BLD: 4.69 M/UL — SIGNIFICANT CHANGE UP (ref 4.2–5.4)
RBC # FLD: 15 % — HIGH (ref 11.5–14.5)
SP GR SPEC: 1.02 — SIGNIFICANT CHANGE UP (ref 1.01–1.02)
UROBILINOGEN FLD QL: SIGNIFICANT CHANGE UP
WBC # BLD: 8.98 K/UL — SIGNIFICANT CHANGE UP (ref 4.8–10.8)
WBC # FLD AUTO: 8.98 K/UL — SIGNIFICANT CHANGE UP (ref 4.8–10.8)

## 2019-07-10 PROCEDURE — 59409 OBSTETRICAL CARE: CPT | Mod: U7

## 2019-07-10 RX ORDER — INSULIN LISPRO 100/ML
4 VIAL (ML) SUBCUTANEOUS ONCE
Refills: 0 | Status: COMPLETED | OUTPATIENT
Start: 2019-07-10 | End: 2019-07-10

## 2019-07-10 RX ORDER — OXYTOCIN 10 UNIT/ML
2 VIAL (ML) INJECTION
Qty: 30 | Refills: 0 | Status: DISCONTINUED | OUTPATIENT
Start: 2019-07-10 | End: 2019-07-11

## 2019-07-10 RX ORDER — INSULIN LISPRO 100/ML
3 VIAL (ML) SUBCUTANEOUS ONCE
Refills: 0 | Status: COMPLETED | OUTPATIENT
Start: 2019-07-10 | End: 2019-07-10

## 2019-07-10 RX ORDER — AMPICILLIN TRIHYDRATE 250 MG
2 CAPSULE ORAL ONCE
Refills: 0 | Status: COMPLETED | OUTPATIENT
Start: 2019-07-10 | End: 2019-07-10

## 2019-07-10 RX ORDER — HUMAN INSULIN 100 [IU]/ML
28 INJECTION, SUSPENSION SUBCUTANEOUS
Refills: 0 | Status: DISCONTINUED | OUTPATIENT
Start: 2019-07-10 | End: 2019-07-11

## 2019-07-10 RX ORDER — OXYTOCIN 10 UNIT/ML
333.33 VIAL (ML) INJECTION
Qty: 20 | Refills: 0 | Status: COMPLETED | OUTPATIENT
Start: 2019-07-10 | End: 2019-07-10

## 2019-07-10 RX ORDER — AMPICILLIN TRIHYDRATE 250 MG
CAPSULE ORAL
Refills: 0 | Status: DISCONTINUED | OUTPATIENT
Start: 2019-07-10 | End: 2019-07-11

## 2019-07-10 RX ORDER — AMPICILLIN TRIHYDRATE 250 MG
1 CAPSULE ORAL EVERY 4 HOURS
Refills: 0 | Status: DISCONTINUED | OUTPATIENT
Start: 2019-07-10 | End: 2019-07-11

## 2019-07-10 RX ORDER — SODIUM CHLORIDE 9 MG/ML
1000 INJECTION, SOLUTION INTRAVENOUS
Refills: 0 | Status: DISCONTINUED | OUTPATIENT
Start: 2019-07-10 | End: 2019-07-11

## 2019-07-10 RX ADMIN — SODIUM CHLORIDE 125 MILLILITER(S): 9 INJECTION, SOLUTION INTRAVENOUS at 11:06

## 2019-07-10 RX ADMIN — Medication 216 GRAM(S): at 11:22

## 2019-07-10 RX ADMIN — Medication 2 MILLIUNIT(S)/MIN: at 11:07

## 2019-07-10 RX ADMIN — Medication 208 GRAM(S): at 18:53

## 2019-07-10 RX ADMIN — Medication 4 UNIT(S): at 21:15

## 2019-07-10 RX ADMIN — Medication 3 UNIT(S): at 13:19

## 2019-07-10 RX ADMIN — Medication 208 GRAM(S): at 23:00

## 2019-07-10 RX ADMIN — Medication 208 GRAM(S): at 15:11

## 2019-07-10 RX ADMIN — HUMAN INSULIN 28 UNIT(S): 100 INJECTION, SUSPENSION SUBCUTANEOUS at 11:17

## 2019-07-10 NOTE — PROGRESS NOTE ADULT - SUBJECTIVE AND OBJECTIVE BOX
PGY3 Note    Patient seen at bedside for evaluation of labor progression, reports LOF and painful contractions.     T(F): 99 (07-10 @ 08:52), Max: 99 (07-10 @ 08:52)  HR: 85 (07-10 @ 11:54)  BP: 101/57 (07-10 @ 11:54) (101/57 - 114/60)  RR: 19 (07-10 @ 08:52)  EFM: 150/mod/+accels, occ variable decels  TOCO: q2 min  SVE: 4/70/-3, grossly ruptured, clear, copious fluid    Medications:  ampicillin  IVPB: 216 (07-10 @ 11:22)  insulin NPH human recombinant: 28 (07-10 @ 11:17)  lactated ringers.: 125 (07-10 @ 09:06)  oxytocin Infusion: 8 (07-10 @ 10:14)    Labs:  /202 (uncovered)               11.8   8.98  )-----------( 213      ( 10 Jul 2019 10:00 )             37.6     ABO RH Interpretation: AB POS (07-10 @ 10:23)    Urinalysis Basic - ( 10 Jul 2019 10:00 )    Color: Yellow / Appearance: Slightly Turbid / S.017 / pH: x  Gluc: x / Ketone: Small  / Bili: Negative / Urobili: <2 mg/dL   Blood: x / Protein: 30 mg/dL / Nitrite: Negative   Leuk Esterase: Large / RBC: 0 /HPF /  /HPF   Sq Epi: x / Non Sq Epi: 17 /HPF / Bacteria: Many    RPR NR  HIV NR

## 2019-07-10 NOTE — OB PROVIDER H&P - ASSESSMENT
34y  @ 37 weeks, AMA, h/o DM, GBS positive,  IOL for DM,    Plan:  Admit to L&D  IVF, Labs  IV antibiotics   Continuous TOCO & EFM  Clear liquids  IOL w/  Finger stick q 4 hrs in latent labor, then q 2 hrs in active labor  Pain Management PRN    Dr. Segura aware 34y  @ 37 weeks, AMA, h/o DM2, GBS positive,  polyhydramnios, IOL for DM.    Plan:  Admit to L&D  IVF, Labs  IV antibiotics   Continuous TOCO & EFM  Clear liquids  IOL w/ Pitocin  Finger stick q 2 hrs   Pain Management PRN    Dr. Segura aware 34y  @ 37 weeks, AMA, h/o DM2, GBS positive,  polyhydramnios, IOL for DM.    Plan:  Admit to L&D  IVF, Labs, HIV  IV antibiotics   Continuous TOCO & EFM  Clear liquids  IOL w/ Pitocin  Finger stick q 2 hrs in latent and q 1 hr in active  Pain Management PRN    Dr. Segura aware

## 2019-07-10 NOTE — PROGRESS NOTE ADULT - ASSESSMENT
34y  @ 37 weeks, AMA, h/o DM2 on insulin, GBS positive on ampicillin, polyhydramnios, s/p SROM, IOL for DM on pitocin.  -Will cover  with 3u lispro  -Continuous TOCO & EFM  Clear liquids  c/w ampicillin, pitocin  -Epidural for pain mgt  Finger stick q 2 hrs in latent and q 1 hr in active    Dr. Llanes and Imelda aware

## 2019-07-10 NOTE — OB PROVIDER H&P - NSHPPHYSICALEXAM_GEN_ALL_CORE
T(C): 37.2 (07-10-19 @ 08:52), Max: 37.2 (07-10-19 @ 08:52)  HR: 101 (07-10-19 @ 08:52) (101 - 101)  BP: 114/60 (07-10-19 @ 08:52) (114/60 - 114/60)  RR: 19 (07-10-19 @ 08:52) (19 - 19)  FS: 112    EFM: 145 bpm, moderate variability, +accels  TOCO: q mins T(C): 37.2 (07-10-19 @ 08:52), Max: 37.2 (07-10-19 @ 08:52)  HR: 101 (07-10-19 @ 08:52) (101 - 101)  BP: 114/60 (07-10-19 @ 08:52) (114/60 - 114/60)  RR: 19 (07-10-19 @ 08:52) (19 - 19)  FS: 112    EFM: 145 bpm, moderate variability, +accels  TOCO: none T(C): 37.2 (07-10-19 @ 08:52), Max: 37.2 (07-10-19 @ 08:52)  HR: 101 (07-10-19 @ 08:52) (101 - 101)  BP: 114/60 (07-10-19 @ 08:52) (114/60 - 114/60)  RR: 19 (07-10-19 @ 08:52) (19 - 19)  FS: 112    EFM: 145 bpm, moderate variability, +accels  TOCO: irregular    EFW: 3770 gms T(C): 37.2 (07-10-19 @ 08:52), Max: 37.2 (07-10-19 @ 08:52)  HR: 101 (07-10-19 @ 08:52) (101 - 101)  BP: 114/60 (07-10-19 @ 08:52) (114/60 - 114/60)  RR: 19 (07-10-19 @ 08:52) (19 - 19)  FS: 112      General: In no apparent distress  HEENT:  Atraumatic  CV:  Normal S1 S2  Pul:  Clear to auscultation bilaterally  Abdomen: Soft, obese, nontender, nondistended, no rebound no guarding  Extremities:  Nontender calves  Neurology:  Deep tendon reflexes 2+ bilaterally  EFM: 145 bpm, moderate variability, +accels  TOCO: irregular    EFW: 3770 gms

## 2019-07-10 NOTE — OB PROVIDER H&P - ATTENDING COMMENTS
[facility] Staff.    Ms. Arora is known to me from high risk clinic.  I reviewed and confirmed the history and performed my own physical exam and I agree with the documentation above.   Briefly, Ms. Arora is a 34y  @ 37weeks with MD2, on insulin, poorly controlled.  Her hemoglobin A1C was quite elevated at the beginning of the pregnancy, dropped, and then started to rise once again.  She was receiving  testing twice weekly.  because her fingersticks are not well controlled and her hemoglobin A1C is rising, coupled with the polyhydramnios I discussed an early term delivery with ms. Arora to reduce the risk of intrauterine fetal demise.  Ms. Arora agreed and desires delivery at around 37 weeks gestation.  She understands that the lungs of babies of mothers who have diabetes mature later and may need help breathing and may need to go to the NICU.    Ms. Arora understands that there is the possibility that she may need a  delivery to deliver the baby.  We discussed the risks and benefits, the risks include but are not limited to bleeding, infection, damage to the surrounding organs, and making a cut on the baby.  Ms. Arora understands she is at higher risk for wound  separation and infection due to her diabetes.  Ms. Arora agrees to have a blood transfusion should she need it.                            11.8   8.98  )-----------( 213      ( 10 Jul 2019 10:00 )             37.6         We lou give the morning NPH and proceed with induction of labor.

## 2019-07-10 NOTE — PROGRESS NOTE ADULT - SUBJECTIVE AND OBJECTIVE BOX
PGY3 note    Patient seen at bedside for evaluation of labor progression, resting comfortably with epidural in place.    T(F): 99 (07-10 @ 08:52), Max: 99 (07-10 @ 08:52)  HR: 81 (07-10 @ 16:46)  BP: 106/59 (07-10 @ 16:31) (101/57 - 116/58)  RR: 18 (07-10 @ 15:47)  EFM: 145/mod/+accels  TOCO: q3-4  SVE: 5-6/90/-3    Medications:  ampicillin  IVPB: 216 (07-10 @ 11:22)  ampicillin  IVPB: 208 (07-10 @ 15:11)  insulin lispro Injectable (HumaLOG): 3 (07-10 @ 13:19)  insulin NPH human recombinant: 28 (07-10 @ 11:17)  lactated ringers.: 125 (07-10 @ 09:06)      Labs:  /167/202                        11.8   8.98  )-----------( 213      ( 10 Jul 2019 10:00 )             37.6       RPR NR  UDS neg    ABO RH Interpretation: AB POS (07-10 @ 10:23)    Urinalysis Basic - ( 10 Jul 2019 10:00 )    Color: Yellow / Appearance: Slightly Turbid / S.017 / pH: x  Gluc: x / Ketone: Small  / Bili: Negative / Urobili: <2 mg/dL   Blood: x / Protein: 30 mg/dL / Nitrite: Negative   Leuk Esterase: Large / RBC: 0 /HPF /  /HPF   Sq Epi: x / Non Sq Epi: 17 /HPF / Bacteria: Many    HIV NR

## 2019-07-10 NOTE — PROGRESS NOTE ADULT - SUBJECTIVE AND OBJECTIVE BOX
PGY2 L&D Progress Note    Patient seen at bedside for Cat II tracing with recurrent late decelerations, resolved now after resuscitation by turning the patient on her right side, doing well otherwise, no complaints.     T(F): 99 (07-10 @ 08:52), Max: 99 (07-10 @ 08:52)  HR: 83 (07-10 @ 13:20)  BP: 110/57 (07-10 @ 13:20) (101/57 - 114/60)  RR: 19 (07-10 @ 08:52)  EFM: 150/mod/accel+/recurrent late decels --> late decelerations resolved now s/p resuscitation by turning her on her right side  TOCO: q2-4min  SVE: deferred, last exam at 1242 4/70/-3 SROM clear at 1236, vertex as per Dr. Pryor's exam     Medications:  pitocin started at 1107, now at 8 mu/min  ampicillin started at 1122  insulin lispro Injectable of 3 units at 1319   insulin NPH human recombinant 28 units at 1117    Labs:                        11.8   8.98  )-----------( 213      ( 10 Jul 2019 10:00 )             37.6      Prenatal Syphilis Test: Nonreact (07-10 @ 10:00)  Rapid HIV-1/2 Antibody: Nonreact (07-10 @ 10:00)  Antibody Screen: NEG (07-10-19 @ 10:23)    Urinalysis Basic - ( 10 Jul 2019 10:00 )  Color: Yellow / Appearance: Slightly Turbid / S.017 / pH: x  Gluc: x / Ketone: Small  / Bili: Negative / Urobili: <2 mg/dL   Blood: x / Protein: 30 mg/dL / Nitrite: Negative   Leuk Esterase: Large / RBC: 0 /HPF /  /HPF   Sq Epi: x / Non Sq Epi: 17 /HPF / Bacteria: Many    A/P:   34y  at 37w, GBS pos, AMA, h/o DM2 on insulin, w/ polyhydramnios, s/p SROM, s/p ampicillin, IOL for DM on pitocin,    -Continuous EFM/toco  -Clear liquids  -C/w ampicillin, pitocin  -Epidural for pain mgt  -Finger stick q 2 hrs in latent and q 1 hr in active    Dr. Llanes and Dr. Rivero to be made aware.

## 2019-07-10 NOTE — PROCEDURE NOTE - NSLOADDOSE_OBGYN_ALL_OB
10 ML of 0.25 percent Bupivicaine 10 ML of 0.25 percent Bupivicaine/0.1% Bupivacaine with Fentanyl 2 mcg/ml infusion started at 15ml/hr/Continuous Bupivicaine 0.1 percent

## 2019-07-10 NOTE — OB PROVIDER H&P - NS_OBGYNHISTORY_OBGYN_ALL_OB_FT
OB Hx:  x 2. Largest 7-4               SAB x 2. D&C x 2    G1 07 FT  F 6-5 [facility] DM2 Received Epidural  G2 14 FT  M 7-4 [place] DM2 Received Epidural    Gyn Hx: h.o abnormal paps (HPV +, colpo PP), Chlamydia (12 y.o. treated )  Denies cysts, fibroids.

## 2019-07-10 NOTE — PROGRESS NOTE ADULT - SUBJECTIVE AND OBJECTIVE BOX
PGY3 Note    Pt seen at bedside s/p epidural, complaining of pressure. Re-examined, 5/90/-3.   Noted to have recurrent late decels. Pt repositioned, O2 in place, bolused. Pitocin decreased, and then shut off.     Will continue to monitor tracing.  Dr. Llanes and Dr. Segura aware.

## 2019-07-10 NOTE — OB PROVIDER H&P - NSHPLABSRESULTS_GEN_ALL_CORE
7/3/19: 36 weeks: cephalic, ant placenta, MVP 76 mm, BPP 8/8  7/1/19: 35.5 weeks: cephalic, ant placenta, MVP 95 mm, BPP 10/10  6/24/19: 34.5 weeks: EFW 2973 gms (73%), cephalic, ant placenta, MVP 68 mm  6/17/19: 35.5 weeks: cephalic, ant placenta, MVP 98mm mildly increased, BPP 10/10  6/13/19: 33.1 weeks: cephalic, ant placenta, MVP 81 mm mildly increased, BPP 10/10  6/6/19: 32.1 weeks: cephalic, ant placenta, MVP 90mm mildly increased, BPP 10/10  5/23/19: 30.1 weeks: EFW 1895 gms (73%), cephalic, ant placenta, MVP 97mm, moderately increased, polyhydramnios, BPP 8/8  5/9/19: 28.1 weeks: cephalic, ant placenta, MVP 67 mm, BPP 8/8  3/14/19: 20.1 weeks: EFW 3710 gms, cephalic, 3 VC, ant placenta, MVP 59 mm    Measles: immune

## 2019-07-10 NOTE — PROGRESS NOTE ADULT - SUBJECTIVE AND OBJECTIVE BOX
[facility] Staff    VE at 6:30 PM 8/90/-1.  The patient is on oxytocin 3mu/min.      External fetal heart tracing:  baseline 140 beats per minute, moderate variability  Positive accelerations.  There are late decelerations noted.  The patient is receiving oxygen an the oxytocin was stopped.  Contractions:  q2 - q3 minutes.    Category II tracing.  Continue to monitor.    The patient understands that I may not be present for delivery.    Oswaldo Ervin MD

## 2019-07-10 NOTE — PROGRESS NOTE ADULT - ASSESSMENT
34y  @ 37 weeks, AMA, h/o DM2 on insulin, GBS positive on ampicillin, polyhydramnios, s/p SROM, IOL for poorly controlled T2DM, now with Cat I tracing  -Will restart pitocin now for augmentation  -Continuous TOCO & EFM  -Clear liquids  c/w ampicillin  -pain mgt  Finger stick q 2 hrs in latent and q 1 hr in active    Dr. Llanes and Imelda aware.

## 2019-07-11 ENCOUNTER — APPOINTMENT (OUTPATIENT)
Dept: ANTEPARTUM | Facility: CLINIC | Age: 34
End: 2019-07-11

## 2019-07-11 DIAGNOSIS — O26.893 OTHER SPECIFIED PREGNANCY RELATED CONDITIONS, THIRD TRIMESTER: ICD-10-CM

## 2019-07-11 DIAGNOSIS — O47.9 FALSE LABOR, UNSPECIFIED: ICD-10-CM

## 2019-07-11 DIAGNOSIS — Z71.89 OTHER SPECIFIED COUNSELING: ICD-10-CM

## 2019-07-11 PROBLEM — E10.65 TYPE 1 DIABETES MELLITUS WITH HYPERGLYCEMIA: Chronic | Status: INACTIVE | Noted: 2019-06-24 | Resolved: 2019-07-10

## 2019-07-11 LAB
GLUCOSE BLDC GLUCOMTR-MCNC: 106 MG/DL — HIGH (ref 70–99)
GLUCOSE BLDC GLUCOMTR-MCNC: 119 MG/DL — HIGH (ref 70–99)
GLUCOSE BLDC GLUCOMTR-MCNC: 196 MG/DL — HIGH (ref 70–99)
GLUCOSE BLDC GLUCOMTR-MCNC: 231 MG/DL — HIGH (ref 70–99)
GLUCOSE BLDC GLUCOMTR-MCNC: 93 MG/DL — SIGNIFICANT CHANGE UP (ref 70–99)
HCT VFR BLD CALC: 33.7 % — LOW (ref 37–47)
HGB BLD-MCNC: 10.8 G/DL — LOW (ref 12–16)
MCHC RBC-ENTMCNC: 25.8 PG — LOW (ref 27–31)
MCHC RBC-ENTMCNC: 32 G/DL — SIGNIFICANT CHANGE UP (ref 32–37)
MCV RBC AUTO: 80.6 FL — LOW (ref 81–99)
NRBC # BLD: 0 /100 WBCS — SIGNIFICANT CHANGE UP (ref 0–0)
PLATELET # BLD AUTO: 201 K/UL — SIGNIFICANT CHANGE UP (ref 130–400)
RBC # BLD: 4.18 M/UL — LOW (ref 4.2–5.4)
RBC # FLD: 15.1 % — HIGH (ref 11.5–14.5)
WBC # BLD: 16.42 K/UL — HIGH (ref 4.8–10.8)
WBC # FLD AUTO: 16.42 K/UL — HIGH (ref 4.8–10.8)

## 2019-07-11 RX ORDER — HUMAN INSULIN 100 [IU]/ML
14 INJECTION, SUSPENSION SUBCUTANEOUS
Refills: 0 | Status: DISCONTINUED | OUTPATIENT
Start: 2019-07-11 | End: 2019-07-13

## 2019-07-11 RX ORDER — HYDROCORTISONE 1 %
1 OINTMENT (GRAM) TOPICAL EVERY 6 HOURS
Refills: 0 | Status: DISCONTINUED | OUTPATIENT
Start: 2019-07-11 | End: 2019-07-15

## 2019-07-11 RX ORDER — KETOROLAC TROMETHAMINE 30 MG/ML
30 SYRINGE (ML) INJECTION ONCE
Refills: 0 | Status: DISCONTINUED | OUTPATIENT
Start: 2019-07-11 | End: 2019-07-11

## 2019-07-11 RX ORDER — OXYTOCIN 10 UNIT/ML
333.33 VIAL (ML) INJECTION
Qty: 20 | Refills: 0 | Status: DISCONTINUED | OUTPATIENT
Start: 2019-07-11 | End: 2019-07-15

## 2019-07-11 RX ORDER — INSULIN LISPRO 100/ML
16 VIAL (ML) SUBCUTANEOUS
Refills: 0 | Status: DISCONTINUED | OUTPATIENT
Start: 2019-07-11 | End: 2019-07-13

## 2019-07-11 RX ORDER — MAGNESIUM HYDROXIDE 400 MG/1
30 TABLET, CHEWABLE ORAL
Refills: 0 | Status: DISCONTINUED | OUTPATIENT
Start: 2019-07-11 | End: 2019-07-15

## 2019-07-11 RX ORDER — DEXTROSE 50 % IN WATER 50 %
25 SYRINGE (ML) INTRAVENOUS ONCE
Refills: 0 | Status: DISCONTINUED | OUTPATIENT
Start: 2019-07-11 | End: 2019-07-14

## 2019-07-11 RX ORDER — SIMETHICONE 80 MG/1
80 TABLET, CHEWABLE ORAL EVERY 4 HOURS
Refills: 0 | Status: DISCONTINUED | OUTPATIENT
Start: 2019-07-11 | End: 2019-07-15

## 2019-07-11 RX ORDER — GLYCERIN ADULT
1 SUPPOSITORY, RECTAL RECTAL AT BEDTIME
Refills: 0 | Status: DISCONTINUED | OUTPATIENT
Start: 2019-07-11 | End: 2019-07-15

## 2019-07-11 RX ORDER — OXYCODONE HYDROCHLORIDE 5 MG/1
5 TABLET ORAL
Refills: 0 | Status: DISCONTINUED | OUTPATIENT
Start: 2019-07-11 | End: 2019-07-15

## 2019-07-11 RX ORDER — GLUCAGON INJECTION, SOLUTION 0.5 MG/.1ML
1 INJECTION, SOLUTION SUBCUTANEOUS ONCE
Refills: 0 | Status: DISCONTINUED | OUTPATIENT
Start: 2019-07-11 | End: 2019-07-15

## 2019-07-11 RX ORDER — PRAMOXINE HYDROCHLORIDE 150 MG/15G
1 AEROSOL, FOAM RECTAL EVERY 4 HOURS
Refills: 0 | Status: DISCONTINUED | OUTPATIENT
Start: 2019-07-11 | End: 2019-07-15

## 2019-07-11 RX ORDER — LANOLIN
1 OINTMENT (GRAM) TOPICAL EVERY 6 HOURS
Refills: 0 | Status: DISCONTINUED | OUTPATIENT
Start: 2019-07-11 | End: 2019-07-15

## 2019-07-11 RX ORDER — DIPHENHYDRAMINE HCL 50 MG
25 CAPSULE ORAL EVERY 6 HOURS
Refills: 0 | Status: DISCONTINUED | OUTPATIENT
Start: 2019-07-11 | End: 2019-07-15

## 2019-07-11 RX ORDER — IBUPROFEN 200 MG
600 TABLET ORAL EVERY 6 HOURS
Refills: 0 | Status: COMPLETED | OUTPATIENT
Start: 2019-07-11 | End: 2020-06-08

## 2019-07-11 RX ORDER — HUMAN INSULIN 100 [IU]/ML
23 INJECTION, SUSPENSION SUBCUTANEOUS AT BEDTIME
Refills: 0 | Status: DISCONTINUED | OUTPATIENT
Start: 2019-07-11 | End: 2019-07-13

## 2019-07-11 RX ORDER — OXYCODONE HYDROCHLORIDE 5 MG/1
5 TABLET ORAL ONCE
Refills: 0 | Status: DISCONTINUED | OUTPATIENT
Start: 2019-07-11 | End: 2019-07-15

## 2019-07-11 RX ORDER — DEXTROSE 50 % IN WATER 50 %
15 SYRINGE (ML) INTRAVENOUS ONCE
Refills: 0 | Status: DISCONTINUED | OUTPATIENT
Start: 2019-07-11 | End: 2019-07-14

## 2019-07-11 RX ORDER — IBUPROFEN 200 MG
600 TABLET ORAL EVERY 6 HOURS
Refills: 0 | Status: DISCONTINUED | OUTPATIENT
Start: 2019-07-11 | End: 2019-07-15

## 2019-07-11 RX ORDER — DEXTROSE 50 % IN WATER 50 %
12.5 SYRINGE (ML) INTRAVENOUS ONCE
Refills: 0 | Status: DISCONTINUED | OUTPATIENT
Start: 2019-07-11 | End: 2019-07-14

## 2019-07-11 RX ORDER — AER TRAVELER 0.5 G/1
1 SOLUTION RECTAL; TOPICAL EVERY 4 HOURS
Refills: 0 | Status: DISCONTINUED | OUTPATIENT
Start: 2019-07-11 | End: 2019-07-15

## 2019-07-11 RX ORDER — BENZOCAINE 10 %
1 GEL (GRAM) MUCOUS MEMBRANE EVERY 6 HOURS
Refills: 0 | Status: DISCONTINUED | OUTPATIENT
Start: 2019-07-11 | End: 2019-07-15

## 2019-07-11 RX ORDER — DOCUSATE SODIUM 100 MG
100 CAPSULE ORAL
Refills: 0 | Status: DISCONTINUED | OUTPATIENT
Start: 2019-07-11 | End: 2019-07-15

## 2019-07-11 RX ORDER — ACETAMINOPHEN 500 MG
975 TABLET ORAL
Refills: 0 | Status: DISCONTINUED | OUTPATIENT
Start: 2019-07-11 | End: 2019-07-15

## 2019-07-11 RX ORDER — SODIUM CHLORIDE 9 MG/ML
3 INJECTION INTRAMUSCULAR; INTRAVENOUS; SUBCUTANEOUS EVERY 8 HOURS
Refills: 0 | Status: DISCONTINUED | OUTPATIENT
Start: 2019-07-11 | End: 2019-07-15

## 2019-07-11 RX ORDER — DIBUCAINE 1 %
1 OINTMENT (GRAM) RECTAL EVERY 6 HOURS
Refills: 0 | Status: DISCONTINUED | OUTPATIENT
Start: 2019-07-11 | End: 2019-07-15

## 2019-07-11 RX ORDER — SODIUM CHLORIDE 9 MG/ML
1000 INJECTION, SOLUTION INTRAVENOUS
Refills: 0 | Status: DISCONTINUED | OUTPATIENT
Start: 2019-07-11 | End: 2019-07-14

## 2019-07-11 RX ADMIN — HUMAN INSULIN 23 UNIT(S): 100 INJECTION, SUSPENSION SUBCUTANEOUS at 21:48

## 2019-07-11 RX ADMIN — Medication 975 MILLIGRAM(S): at 21:19

## 2019-07-11 RX ADMIN — Medication 16 UNIT(S): at 08:13

## 2019-07-11 RX ADMIN — HUMAN INSULIN 14 UNIT(S): 100 INJECTION, SUSPENSION SUBCUTANEOUS at 08:17

## 2019-07-11 RX ADMIN — Medication 16 UNIT(S): at 16:34

## 2019-07-11 RX ADMIN — Medication 600 MILLIGRAM(S): at 11:50

## 2019-07-11 RX ADMIN — Medication 30 MILLIGRAM(S): at 03:50

## 2019-07-11 RX ADMIN — Medication 208 GRAM(S): at 03:00

## 2019-07-11 RX ADMIN — Medication 600 MILLIGRAM(S): at 17:47

## 2019-07-11 RX ADMIN — Medication 975 MILLIGRAM(S): at 15:45

## 2019-07-11 RX ADMIN — Medication 1000 MILLIUNIT(S)/MIN: at 03:52

## 2019-07-11 RX ADMIN — Medication 16 UNIT(S): at 11:55

## 2019-07-11 RX ADMIN — Medication 975 MILLIGRAM(S): at 08:14

## 2019-07-11 NOTE — OB PROVIDER DELIVERY SUMMARY - NSPROVIDERDELIVERYNOTE_OBGYN_ALL_OB_FT
Pt was fully dilated and pushing.  The head was delivered direct OA and restituted to LOT.  A loose nuchal cord was noted and reduced.  The anterior shoulder was delivered followed by the posterior shoulder and rest of body atraumatically.  The cord was clamped and cut, infant was handed to pediatric team.  A cord segment and cord blood were obtained.  The placenta was delivered intact under suprapubic pressure.  Pitocin was started.  Firm uterine tone and good hemostasis was noted.  The cervix, vaginal vault, and perineum were inspected and no lacerations were noted. Pt was fully dilated and pushing.  The head was delivered direct OA and restituted to LOT.  A loose nuchal cord was noted and reduced.  The anterior shoulder was delivered followed by the posterior shoulder and rest of body atraumatically.  The cord was clamped and cut, infant was handed to pediatric team.  A cord segment and cord blood were obtained.  The placenta was delivered intact under suprapubic pressure.  Pitocin was started.  Firm uterine tone and good hemostasis was noted.  The cervix, vaginal vault, and perineum were inspected and no lacerations were noted.      OB ATTENDING: present for delivery

## 2019-07-11 NOTE — PROGRESS NOTE ADULT - SUBJECTIVE AND OBJECTIVE BOX
PGY 1 Note:  Dr. Reese and I were at bedside with patient pushing for over an hour. Pt demonstrated poor maternal effort and exhaustion. At this point, pt had tachycardia (106 bpm) along with fetal tachycardia (170-189 bpm). Oral temperature was 99.7F. Will allow pt to rest and regain strength, will attempt to push again once pt has rested.     Vital Signs Last 24 Hrs  T(F): 99.68 (11 Jul 2019 01:18), Max: 99.68 (11 Jul 2019 01:18)  HR: 99 (11 Jul 2019 01:53) (59 - 158)  BP: 131/61 (11 Jul 2019 01:47) (84/53 - 158/66)  RR: 18 (10 Jul 2019 22:30) (18 - 19)      Dr. Reese and Dr. Segura aware

## 2019-07-12 LAB
GLUCOSE BLDC GLUCOMTR-MCNC: 134 MG/DL — HIGH (ref 70–99)
GLUCOSE BLDC GLUCOMTR-MCNC: 151 MG/DL — HIGH (ref 70–99)
GLUCOSE BLDC GLUCOMTR-MCNC: 161 MG/DL — HIGH (ref 70–99)
GLUCOSE BLDC GLUCOMTR-MCNC: 51 MG/DL — LOW (ref 70–99)
GLUCOSE BLDC GLUCOMTR-MCNC: 96 MG/DL — SIGNIFICANT CHANGE UP (ref 70–99)

## 2019-07-12 PROCEDURE — 99231 SBSQ HOSP IP/OBS SF/LOW 25: CPT

## 2019-07-12 RX ORDER — LORATADINE 10 MG/1
10 TABLET ORAL DAILY
Refills: 0 | Status: DISCONTINUED | OUTPATIENT
Start: 2019-07-12 | End: 2019-07-15

## 2019-07-12 RX ADMIN — Medication 600 MILLIGRAM(S): at 23:57

## 2019-07-12 RX ADMIN — Medication 16 UNIT(S): at 12:47

## 2019-07-12 RX ADMIN — HUMAN INSULIN 14 UNIT(S): 100 INJECTION, SUSPENSION SUBCUTANEOUS at 08:28

## 2019-07-12 RX ADMIN — Medication 600 MILLIGRAM(S): at 18:07

## 2019-07-12 RX ADMIN — Medication 975 MILLIGRAM(S): at 21:00

## 2019-07-12 RX ADMIN — Medication 975 MILLIGRAM(S): at 04:20

## 2019-07-12 RX ADMIN — Medication 16 UNIT(S): at 08:30

## 2019-07-12 RX ADMIN — LORATADINE 10 MILLIGRAM(S): 10 TABLET ORAL at 16:27

## 2019-07-12 RX ADMIN — Medication 975 MILLIGRAM(S): at 16:01

## 2019-07-12 RX ADMIN — HUMAN INSULIN 23 UNIT(S): 100 INJECTION, SUSPENSION SUBCUTANEOUS at 21:58

## 2019-07-12 RX ADMIN — Medication 600 MILLIGRAM(S): at 06:53

## 2019-07-12 RX ADMIN — Medication 1 TABLET(S): at 12:48

## 2019-07-12 RX ADMIN — Medication 600 MILLIGRAM(S): at 00:32

## 2019-07-12 RX ADMIN — Medication 16 UNIT(S): at 17:00

## 2019-07-12 RX ADMIN — Medication 975 MILLIGRAM(S): at 08:43

## 2019-07-12 RX ADMIN — Medication 600 MILLIGRAM(S): at 12:48

## 2019-07-12 NOTE — PROGRESS NOTE ADULT - SUBJECTIVE AND OBJECTIVE BOX
JOSE ANGEL LAURIE  34y  Female    PGY1 Note:  PPD#1   Pt is recovering well, no acute complaints. Pt states her pain is well controlled. Pt denies fever, chills, nausea, vomiting, SOB, chest pain, extremity swelling or pain, dizziness, palpitations. No episodes of hypoglycemia in the past 24 hours. Pt had an elevated fingerstick to 196 yesterday morning.    Ambulating: Yes  Voiding: Yes  Flatus: Yes  Bowel movements: Yes   Breast or bottle feeding: Both  Diet: Regular    MEDICATIONS  (STANDING):  acetaminophen   Tablet .. 975 milliGRAM(s) Oral <User Schedule>  dextrose 5%. 1000 milliLiter(s) (50 mL/Hr) IV Continuous <Continuous>  dextrose 50% Injectable 12.5 Gram(s) IV Push once  dextrose 50% Injectable 25 Gram(s) IV Push once  dextrose 50% Injectable 25 Gram(s) IV Push once  ibuprofen  Tablet. 600 milliGRAM(s) Oral every 6 hours  insulin lispro Injectable (HumaLOG) 16 Unit(s) SubCutaneous before breakfast  insulin lispro Injectable (HumaLOG) 16 Unit(s) SubCutaneous before lunch  insulin lispro Injectable (HumaLOG) 16 Unit(s) SubCutaneous before dinner  insulin NPH human recombinant 14 Unit(s) SubCutaneous before breakfast  insulin NPH human recombinant 23 Unit(s) SubCutaneous at bedtime  oxytocin Infusion 333.333 milliUNIT(s)/Min (1000 mL/Hr) IV Continuous <Continuous>  prenatal multivitamin 1 Tablet(s) Oral daily  sodium chloride 0.9% lock flush 3 milliLiter(s) IV Push every 8 hours    MEDICATIONS  (PRN):  benzocaine 20%/menthol 0.5% Spray 1 Spray(s) Topical every 6 hours PRN for Perineal discomfort  dextrose 40% Gel 15 Gram(s) Oral once PRN Blood Glucose LESS THAN 70 milliGRAM(s)/deciliter  dibucaine 1% Ointment 1 Application(s) Topical every 6 hours PRN Perineal discomfort  diphenhydrAMINE 25 milliGRAM(s) Oral every 6 hours PRN Pruritus  docusate sodium 100 milliGRAM(s) Oral two times a day PRN For stool softening  glucagon  Injectable 1 milliGRAM(s) IntraMuscular once PRN Glucose LESS THAN 70 milligrams/deciliter  glycerin Suppository - Adult 1 Suppository(s) Rectal at bedtime PRN Constipation  hydrocortisone 1% Cream 1 Application(s) Topical every 6 hours PRN Moderate Pain (4-6)  lanolin Ointment 1 Application(s) Topical every 6 hours PRN nipple soreness  magnesium hydroxide Suspension 30 milliLiter(s) Oral two times a day PRN Constipation  oxyCODONE    IR 5 milliGRAM(s) Oral every 3 hours PRN Moderate to Severe Pain (4-10)  oxyCODONE    IR 5 milliGRAM(s) Oral once PRN Moderate to Severe Pain (4-10)  pramoxine 1%/zinc 5% Cream 1 Application(s) Topical every 4 hours PRN Moderate Pain (4-6)  simethicone 80 milliGRAM(s) Chew every 4 hours PRN Gas  witch hazel Pads 1 Application(s) Topical every 4 hours PRN Perineal discomfort      PAST MEDICAL & SURGICAL HISTORY:  Diabetes mellitus  History of D&C: x 2  H/O eye surgery    Physical Exam  Vital Signs Last 24 Hrs  T(C): 36.2 (12 Jul 2019 00:48), Max: 36.3 (11 Jul 2019 07:30)  T(F): 97.1 (12 Jul 2019 00:48), Max: 97.4 (11 Jul 2019 07:30)  HR: 72 (12 Jul 2019 00:48) (72 - 85)  BP: 103/53 (12 Jul 2019 00:48) (102/53 - 103/58)  RR: 18 (12 Jul 2019 00:48) (18 - 18)    Gen: AAOx3, NAD  Fundus: firm, below umbilicus   Abd: Soft, nontender, nondistended, BS+  Lochia: minimal rubra  Ext: No calf tenderness, no swelling    Labs:                        10.8   16.42 )-----------( 201      ( 11 Jul 2019 17:53 )             33.7                         11.8   8.98  )-----------( 213      ( 10 Jul 2019 10:00 )             37.6      POCT glucose: 93 (at bedtime)

## 2019-07-12 NOTE — PROGRESS NOTE ADULT - ASSESSMENT
A/P: 33yo  PPD#1 S/P , fingerstick glucose overnight well controlled, recovering well   - encourage ambulation, PO hydration  - monitor vitals, bleeding  - f/u fingersticks and adjust insulin regimen as needed  - continue current insulin regimen: 14u/23u NPH, lispro 16/16/16u  - routine postpartum course  - anticipate d/c home tomorrow    Dr. Llanes aware and A/P: 33yo  PPD#1 S/P , fingerstick glucose overnight well controlled, recovering well   - encourage ambulation, PO hydration  - monitor vitals, bleeding  - f/u fingersticks and adjust insulin regimen as needed  - continue current insulin regimen: 14u/23u NPH, lispro 16/16/16u  - routine postpartum course  - anticipate d/c home tomorrow    Dr. Llanes aware and Dr. Man to be made aware

## 2019-07-13 ENCOUNTER — TRANSCRIPTION ENCOUNTER (OUTPATIENT)
Age: 34
End: 2019-07-13

## 2019-07-13 LAB
GLUCOSE BLDC GLUCOMTR-MCNC: 114 MG/DL — HIGH (ref 70–99)
GLUCOSE BLDC GLUCOMTR-MCNC: 115 MG/DL — HIGH (ref 70–99)
GLUCOSE BLDC GLUCOMTR-MCNC: 127 MG/DL — HIGH (ref 70–99)
GLUCOSE BLDC GLUCOMTR-MCNC: 199 MG/DL — HIGH (ref 70–99)
GLUCOSE BLDC GLUCOMTR-MCNC: 50 MG/DL — LOW (ref 70–99)
GLUCOSE BLDC GLUCOMTR-MCNC: 60 MG/DL — LOW (ref 70–99)
GLUCOSE BLDC GLUCOMTR-MCNC: 81 MG/DL — SIGNIFICANT CHANGE UP (ref 70–99)

## 2019-07-13 PROCEDURE — 99232 SBSQ HOSP IP/OBS MODERATE 35: CPT

## 2019-07-13 RX ORDER — HUMAN INSULIN 100 [IU]/ML
46 INJECTION, SUSPENSION SUBCUTANEOUS
Qty: 0 | Refills: 0 | DISCHARGE

## 2019-07-13 RX ORDER — IBUPROFEN 200 MG
1 TABLET ORAL
Qty: 0 | Refills: 0 | DISCHARGE
Start: 2019-07-13

## 2019-07-13 RX ORDER — DOCUSATE SODIUM 100 MG
1 CAPSULE ORAL
Qty: 0 | Refills: 0 | DISCHARGE
Start: 2019-07-13

## 2019-07-13 RX ORDER — INSULIN GLARGINE 100 [IU]/ML
25 INJECTION, SOLUTION SUBCUTANEOUS AT BEDTIME
Refills: 0 | Status: DISCONTINUED | OUTPATIENT
Start: 2019-07-13 | End: 2019-07-14

## 2019-07-13 RX ORDER — INSULIN LISPRO 100/ML
10 VIAL (ML) SUBCUTANEOUS
Refills: 0 | Status: DISCONTINUED | OUTPATIENT
Start: 2019-07-13 | End: 2019-07-14

## 2019-07-13 RX ORDER — HUMAN INSULIN 100 [IU]/ML
12 INJECTION, SUSPENSION SUBCUTANEOUS
Qty: 0 | Refills: 0 | DISCHARGE

## 2019-07-13 RX ORDER — INSULIN GLARGINE 100 [IU]/ML
12 INJECTION, SOLUTION SUBCUTANEOUS EVERY MORNING
Refills: 0 | Status: DISCONTINUED | OUTPATIENT
Start: 2019-07-13 | End: 2019-07-14

## 2019-07-13 RX ORDER — INSULIN ASPART 100 [IU]/ML
32 INJECTION, SOLUTION SUBCUTANEOUS
Qty: 0 | Refills: 0 | DISCHARGE

## 2019-07-13 RX ORDER — BENZOCAINE 10 %
1 GEL (GRAM) MUCOUS MEMBRANE
Qty: 0 | Refills: 0 | DISCHARGE
Start: 2019-07-13

## 2019-07-13 RX ORDER — SIMETHICONE 80 MG/1
1 TABLET, CHEWABLE ORAL
Qty: 0 | Refills: 0 | DISCHARGE
Start: 2019-07-13

## 2019-07-13 RX ORDER — DIBUCAINE 1 %
1 OINTMENT (GRAM) RECTAL
Qty: 0 | Refills: 0 | DISCHARGE
Start: 2019-07-13

## 2019-07-13 RX ORDER — ACETAMINOPHEN 500 MG
2 TABLET ORAL
Qty: 0 | Refills: 0 | DISCHARGE
Start: 2019-07-13

## 2019-07-13 RX ORDER — HUMAN INSULIN 100 [IU]/ML
25 INJECTION, SUSPENSION SUBCUTANEOUS
Qty: 0 | Refills: 0 | DISCHARGE

## 2019-07-13 RX ORDER — HUMAN INSULIN 100 [IU]/ML
28 INJECTION, SUSPENSION SUBCUTANEOUS
Qty: 0 | Refills: 0 | DISCHARGE

## 2019-07-13 RX ADMIN — Medication 975 MILLIGRAM(S): at 20:41

## 2019-07-13 RX ADMIN — INSULIN GLARGINE 25 UNIT(S): 100 INJECTION, SOLUTION SUBCUTANEOUS at 21:35

## 2019-07-13 RX ADMIN — Medication 16 UNIT(S): at 08:30

## 2019-07-13 RX ADMIN — INSULIN GLARGINE 12 UNIT(S): 100 INJECTION, SOLUTION SUBCUTANEOUS at 09:29

## 2019-07-13 RX ADMIN — Medication 975 MILLIGRAM(S): at 03:39

## 2019-07-13 RX ADMIN — Medication 975 MILLIGRAM(S): at 17:41

## 2019-07-13 RX ADMIN — Medication 600 MILLIGRAM(S): at 12:53

## 2019-07-13 RX ADMIN — Medication 16 UNIT(S): at 14:39

## 2019-07-13 RX ADMIN — Medication 600 MILLIGRAM(S): at 19:21

## 2019-07-13 RX ADMIN — Medication 1 TABLET(S): at 12:53

## 2019-07-13 RX ADMIN — Medication 600 MILLIGRAM(S): at 05:50

## 2019-07-13 RX ADMIN — Medication 975 MILLIGRAM(S): at 17:42

## 2019-07-13 NOTE — DISCHARGE NOTE OB - HOSPITAL COURSE
Patient admitted for induction of labor for DM2 and polyhydraminos. Underwent uncomplicated vaginal delivery and had an uncomplicated postpartum course, discharged on PPD 2. Patient admitted for induction of labor for DM2 and polyhydraminos. Underwent uncomplicated vaginal delivery and had an uncomplicated postpartum course, discharged on PPD 4.

## 2019-07-13 NOTE — DISCHARGE NOTE OB - PROVIDER TOKENS
PROVIDER:[TOKEN:[[id number]]],PROVIDER:[TOKEN:[[id number]]] PROVIDER:[TOKEN:[[id number]]],PROVIDER:[TOKEN:[[id number]]],PROVIDER:[TOKEN:[[id number]]]

## 2019-07-13 NOTE — DISCHARGE NOTE OB - CARE PROVIDER_API CALL
Elvia Torrez)  OBSGYN  Physicians  [address] [place] [id number]  Phone: [phone]  Fax: [phone]  Follow Up Time:     Juan Milian)  Internal Medicine  [address] [place] [address]  Phone: [phone]  Fax: [phone]  Follow Up Time: Elvia Torrez)  OBSGYN  Physicians  [address] [place] [address]  Phone: [phone]  Fax: [phone]  Follow Up Time:     Juan Milian)  Internal Medicine  [address] [place] [address]  Phone: [phone]  Fax: [phone]  Follow Up Time:     Oswaldo Ervin)  Medical Genetics; Obstetrics and Gynecology  [address] [place] [address]  Phone: [phone]  Fax: [phone]  Follow Up Time:

## 2019-07-13 NOTE — DISCHARGE NOTE OB - PATIENT PORTAL LINK FT
You can access the [facility] Patient Portal, offered by [facility], by registering with the following website: [email or web address]

## 2019-07-13 NOTE — DISCHARGE NOTE OB - ADDITIONAL INSTRUCTIONS
Follow up in 6 weeks for postpartum visit. Follow up in 1 week with [facility], and 6 weeks for postpartum visit. Follow up in 1 week with [facility], and 6 weeks for postpartum visit.  Please see your endocrinologist within 1 week of discharge from the hospital and please inform them of the medication changes

## 2019-07-13 NOTE — DISCHARGE NOTE OB - MEDICATION SUMMARY - MEDICATIONS TO CHANGE
I will SWITCH the dose or number of times a day I take the medications listed below when I get home from the hospital:  None I will SWITCH the dose or number of times a day I take the medications listed below when I get home from the hospital:    NovoLOG  -- 32 unit(s) subcutaneous 3 times a day    NovoLIN N  -- 46 unit(s) subcutaneous once (at bedtime)    NovoLIN N  -- 28 unit(s) subcutaneous once a day (in the morning) I will SWITCH the dose or number of times a day I take the medications listed below when I get home from the hospital:    NovoLIN N  -- 28 unit(s) subcutaneous once a day (in the morning)

## 2019-07-13 NOTE — PROGRESS NOTE ADULT - ASSESSMENT
35yo  s/p  PPD#2, with DM2 on insulin, recovering well    - Continue current management  - Monitor vitals  - F/u fingersticks and adjust insulin regimen as needed  - continue current insulin regimen: NPH 12u/25u, lispro 16/16/16u  - Pain management prn  - Encourage ambulation, PO hydration  - Outpatient f/u with endocrinology  - D/c home today    [facility] P[facility]or [facility]. 35yo  s/p  PPD#2, with DM2 on insulin, recovering well    - Continue current management  - Monitor vitals  - F/u fingersticks and adjust insulin regimen as needed  - continue current insulin regimen: NPH 12u/25u, lispro 16/16/16u  - Pain management prn  - Encourage ambulation, PO hydration  - Outpatient f/u with endocrinology  - possible D/c home today    [facility]or [facility].

## 2019-07-13 NOTE — CHART NOTE - NSCHARTNOTEFT_GEN_A_CORE
Patient's insulin regimen was changed overnight on 7/12 after a fingerstick of 51 @1500. Insulin regimen was changed from 14u/23u NPH, lispro 16/16/16u to 12u/25u lantus, [id number]/16/16u. On 7/13, fasting fingerstick was 114 @830, patient received morning dose of lispro prior to breakfast. At 1120, patient reported sx of hypoglycemia to nurse, fingerstick was 50. Patient was given a muffin and @1230 before patient ate lunch, fingerstick was 115. Pt's nurse did not give lispro prior to meal. At 1430, two hours post prandial, patient's fingerstick was 199, and lispro was given. At 1643, patient reported symptoms of hypoglycemia, and fingerstick was 60. After a snack and prior to dinner, pt's fingerstick was 81 @1736. Lispro was not given prior to patient's meal. Patient's insulin regimen was changed overnight on 7/12 after a fingerstick of 51 @1500. Insulin regimen was changed from 14u/23u NPH, lispro 16/16/16u to 12u/25u lantus, [id number]/16/16u. On 7/13, fasting fingerstick was 114 @830, patient received morning dose of lispro prior to breakfast. At 1120, patient reported sx of hypoglycemia to nurse, fingerstick was 50. Patient was given a muffin and @1230 before patient ate lunch, fingerstick was 115. Pt's nurse did not give lispro prior to meal. At 1430, two hours post prandial, patient's fingerstick was 199, and lunchtime lispro was mistakenly given by RN. At 1643, patient reported symptoms of hypoglycemia, and fingerstick was 60. After a snack and prior to dinner, pt's fingerstick was 81 @1736. Lispro was not given prior to patient's meal.    Extensive discussion had with patient's RN regarding fingersticks and insulin regimen. Due to hypoglycemic episodes, decision made to change regimen to lantus 12u/25u and lispro 10/10/10u. Both pt and RN informed about and understood the importance of adherence to regimen, and to report any questions to MD.    Dr. Pryor and Dr. Segura aware. Patient's insulin regimen was changed overnight on 7/12 after a fingerstick of 51 @1500. Insulin regimen was changed from 14u/23u NPH, lispro 16/16/16u to 12u/25u lantus, [id number]/16/16u. On 7/13, fasting fingerstick was 114 @830, patient received morning dose of lispro prior to breakfast. At 1120, patient reported sx of hypoglycemia to nurse, fingerstick was 50. Patient was given a muffin and @1230 before patient ate lunch, fingerstick was 115. Pt's nurse did not give lispro prior to meal. At 1430, two hours post prandial, patient's fingerstick was 199, and lunchtime lispro was mistakenly given by RN. At 1643, patient reported symptoms of hypoglycemia, and fingerstick was 60. After a snack and prior to dinner, pt's fingerstick was 81 @1736. Lispro was not given prior to patient's meal.    Extensive discussion had with patient's RN regarding fingersticks and insulin regimen. Due to hypoglycemic episodes, decision made to change regimen to lantus 12u/25u and lispro 10/10/10u. Both pt and RN informed about and understood the importance of adherence to regimen, and to report any questions to MD.    Dr. Pryor and Dr. Segura aware

## 2019-07-13 NOTE — DISCHARGE NOTE OB - PLAN OF CARE
Healthy patient and baby Nothing in the vagina for 6 weeks. No intercourse, no tampons, no douching, no tubs/swimming pools. May shower. Blood sugar control Follow up outpatient with your endocrinologist.

## 2019-07-13 NOTE — DISCHARGE NOTE OB - CARE PROVIDERS DIRECT ADDRESSES
,[email or web address],[email or web address] ,[email or web address]

## 2019-07-13 NOTE — DISCHARGE NOTE OB - CARE PLAN
Principal Discharge DX:	Vaginal delivery  Goal:	Healthy patient and baby  Assessment and plan of treatment:	Nothing in the vagina for 6 weeks. No intercourse, no tampons, no douching, no tubs/swimming pools. May shower.  Secondary Diagnosis:	Diabetes mellitus  Goal:	Blood sugar control  Assessment and plan of treatment:	Follow up outpatient with your endocrinologist.

## 2019-07-13 NOTE — DISCHARGE NOTE OB - MEDICATION SUMMARY - MEDICATIONS TO TAKE
I will START or STAY ON the medications listed below when I get home from the hospital:    acetaminophen 500 mg oral tablet  -- 2 tab(s) by mouth every 6 hours, As Needed  -- Indication: For pain    ibuprofen 600 mg oral tablet  -- 1 tab(s) by mouth every 6 hours, As Needed  -- Indication: For pain    benzocaine 20% topical spray  -- 1 spray(s) on skin every 6 hours, As needed, for Perineal discomfort  -- Indication: For vaginal delivery    dibucaine 1% topical ointment  -- 1 application on skin every 6 hours, As needed, Perineal discomfort  -- Indication: For vaginal delivery    docusate sodium 100 mg oral capsule  -- 1 cap(s) by mouth 2 times a day, As needed, For stool softening  -- Indication: For constipation    simethicone 80 mg oral tablet, chewable  -- 1 tab(s) by mouth every 4 hours, As needed, Gas  -- Indication: For gas I will START or STAY ON the medications listed below when I get home from the hospital:    acetaminophen 500 mg oral tablet  -- 2 tab(s) by mouth every 6 hours, As Needed  -- Indication: For pain    ibuprofen 600 mg oral tablet  -- 1 tab(s) by mouth every 6 hours, As Needed  -- Indication: For pain    NovoLOG 100 units/mL subcutaneous solution  -- 16 unit(s) subcutaneous 3 times a day  -- Indication: For Diabetes mellitus    insulin isophane (NPH) 100 units/mL human recombinant subcutaneous suspension  -- 12 unit(s) subcutaneous once a day (in the morning)  -- Indication: For Diabetes mellitus    insulin isophane (NPH) 100 units/mL human recombinant subcutaneous suspension  -- 25 unit(s) subcutaneous once (at bedtime)  -- Indication: For Diabetes mellitus    benzocaine 20% topical spray  -- 1 spray(s) on skin every 6 hours, As needed, for Perineal discomfort  -- Indication: For vaginal delivery    dibucaine 1% topical ointment  -- 1 application on skin every 6 hours, As needed, Perineal discomfort  -- Indication: For vaginal delivery    docusate sodium 100 mg oral capsule  -- 1 cap(s) by mouth 2 times a day, As needed, For stool softening  -- Indication: For constipation    simethicone 80 mg oral tablet, chewable  -- 1 tab(s) by mouth every 4 hours, As needed, Gas  -- Indication: For gas I will START or STAY ON the medications listed below when I get home from the hospital:    acetaminophen 500 mg oral tablet  -- 2 tab(s) by mouth every 6 hours, As Needed  -- Indication: For pain    ibuprofen 600 mg oral tablet  -- 1 tab(s) by mouth every 6 hours, As Needed  -- Indication: For pain    Glucophage 850 mg oral tablet  -- 1 tab(s) by mouth 2 times a day   -- Check with your doctor before becoming pregnant.  Do not drink alcoholic beverages when taking this medication.  It is very important that you take or use this exactly as directed.  Do not skip doses or discontinue unless directed by your doctor.  Obtain medical advice before taking any non-prescription drugs as some may affect the action of this medication.  Take with food or milk.    -- Indication: For Diabetes mellitus    pioglitazone 30 mg oral tablet  -- 1 tab(s) by mouth once a day   -- Do not drink alcoholic beverages when taking this medication.  It is very important that you take or use this exactly as directed.  Do not skip doses or discontinue unless directed by your doctor.    -- Indication: For Diabetes mellitus    benzocaine 20% topical spray  -- 1 spray(s) on skin every 6 hours, As needed, for Perineal discomfort  -- Indication: For vaginal delivery    dibucaine 1% topical ointment  -- 1 application on skin every 6 hours, As needed, Perineal discomfort  -- Indication: For vaginal delivery    docusate sodium 100 mg oral capsule  -- 1 cap(s) by mouth 2 times a day, As needed, For stool softening  -- Indication: For constipation    simethicone 80 mg oral tablet, chewable  -- 1 tab(s) by mouth every 4 hours, As needed, Gas  -- Indication: For gas

## 2019-07-13 NOTE — DISCHARGE NOTE OB - MEDICATION SUMMARY - MEDICATIONS TO STOP TAKING
I will STOP taking the medications listed below when I get home from the hospital:  None I will STOP taking the medications listed below when I get home from the hospital:    amoxicillin 500 mg oral capsule  -- 1 cap(s) by mouth 2 times a day   -- Finish all this medication unless otherwise directed by prescriber.    NovoLOG  -- 32 unit(s) subcutaneous 3 times a day    NovoLIN N  -- 46 unit(s) subcutaneous once (at bedtime)

## 2019-07-13 NOTE — PROGRESS NOTE ADULT - ATTENDING COMMENTS
dm2 postpartum. had episode of hypoglycemia yesterday 3pm and again 11:40 today. will adjust lispro insulin and continue monitoring FS.

## 2019-07-13 NOTE — PROGRESS NOTE ADULT - SUBJECTIVE AND OBJECTIVE BOX
JOSE ANGEL SULLIVAN 35yo female    PGY 1 Note:    Pt doing well, pain well controlled. No overnight events, no acute complaints. Denies heavy vaginal bleeding. Denies fever, dizziness, weakness, nausea/vomiting, SOB, chest pain, LE swelling, palpitations. Pt had 1 episode of hypoglycemia 7/12 @1500, fingerstick was 51 and patient was symptomatic. Currently reports no symptoms.    Ambulating: Yes  Voiding: Yes  Flatus: Yes  BM: Yes  Breast or bottle feeding: Both  Diet: Regular    PAST MEDICAL & SURGICAL HISTORY:  Diabetes mellitus  History of D&C: x 2  H/O eye surgery    Physical Exam  Vital Signs Last 24 Hrs  T(F): 97.6 (12 Jul 2019 23:52), Max: 97.6 (12 Jul 2019 23:52)  HR: 74 (12 Jul 2019 23:52) (72 - 79)  BP: 112/66 (12 Jul 2019 23:52) (110/50 - 119/66)  RR: 18 (12 Jul 2019 23:52) (18 - 18)    Gen: AAOx3, NAD  Abd: Soft, nontender, nondistended, BS+  Fundus: Firm, nontender, below the umbilicus  Lochia: Normal  Ext: No calf tenderness, no swelling    Labs:                        10.8   16.42 )-----------( 201      ( 11 Jul 2019 17:53 )             33.7                         11.8   8.98  )-----------( 213      ( 10 Jul 2019 10:00 )             37.6     Fingersticks 7/12: 161/96/51/134/151

## 2019-07-14 LAB
GLUCOSE BLDC GLUCOMTR-MCNC: 130 MG/DL — HIGH (ref 70–99)
GLUCOSE BLDC GLUCOMTR-MCNC: 153 MG/DL — HIGH (ref 70–99)
GLUCOSE BLDC GLUCOMTR-MCNC: 55 MG/DL — LOW (ref 70–99)
GLUCOSE BLDC GLUCOMTR-MCNC: 94 MG/DL — SIGNIFICANT CHANGE UP (ref 70–99)

## 2019-07-14 PROCEDURE — 99238 HOSP IP/OBS DSCHRG MGMT 30/<: CPT

## 2019-07-14 RX ORDER — PIOGLITAZONE HYDROCHLORIDE 15 MG/1
30 TABLET ORAL DAILY
Refills: 0 | Status: DISCONTINUED | OUTPATIENT
Start: 2019-07-14 | End: 2019-07-15

## 2019-07-14 RX ORDER — METFORMIN HYDROCHLORIDE 850 MG/1
850 TABLET ORAL
Refills: 0 | Status: DISCONTINUED | OUTPATIENT
Start: 2019-07-14 | End: 2019-07-15

## 2019-07-14 RX ADMIN — METFORMIN HYDROCHLORIDE 850 MILLIGRAM(S): 850 TABLET ORAL at 21:59

## 2019-07-14 RX ADMIN — Medication 600 MILLIGRAM(S): at 00:28

## 2019-07-14 RX ADMIN — Medication 600 MILLIGRAM(S): at 12:20

## 2019-07-14 RX ADMIN — Medication 975 MILLIGRAM(S): at 02:37

## 2019-07-14 RX ADMIN — Medication 600 MILLIGRAM(S): at 23:51

## 2019-07-14 RX ADMIN — Medication 10 UNIT(S): at 08:26

## 2019-07-14 RX ADMIN — INSULIN GLARGINE 12 UNIT(S): 100 INJECTION, SOLUTION SUBCUTANEOUS at 09:59

## 2019-07-14 RX ADMIN — Medication 10 UNIT(S): at 12:20

## 2019-07-14 RX ADMIN — Medication 10 UNIT(S): at 17:18

## 2019-07-14 RX ADMIN — Medication 975 MILLIGRAM(S): at 22:01

## 2019-07-14 RX ADMIN — Medication 600 MILLIGRAM(S): at 06:25

## 2019-07-14 RX ADMIN — Medication 975 MILLIGRAM(S): at 15:06

## 2019-07-14 NOTE — PROGRESS NOTE ADULT - SUBJECTIVE AND OBJECTIVE BOX
PGY 3 Note:    Pt doing well, pain well controlled. Denies heavy vaginal bleeding. No overnight events, no acute complaints. Denies fever, dizziness, weakness, nausea/vomiting, SOB, CP, LE swelling, palpitations.     Ambulating: Yes  Voiding: Yes  Flatus: Yes  Breast or bottle feeding: Both  Diet: Regular    Physical Exam  Vital Signs Last 24 Hrs  T(F): 97.7 (13 Jul 2019 23:49), Max: 97.7 (13 Jul 2019 23:49)  HR: 75 (13 Jul 2019 23:49) (65 - 75)  BP: 121/63 (13 Jul 2019 23:49) (121/63 - 127/73)  RR: 18 (13 Jul 2019 23:49) (18 - 20)    Gen: AAOx3, NAD  Abd: Soft, nontender, nondistended, BS+  Fundus: Firm, nontender, below the umbilicus  Lochia: Normal  Ext: No calf tenderness, no swelling    Labs:                        10.8   16.42 )-----------( 201      ( 11 Jul 2019 17:53 )             33.7                         11.8   8.98  )-----------( 213      ( 10 Jul 2019 10:00 )             37.6 PGY 3 Note:    Pt doing well, pain well controlled. Denies heavy vaginal bleeding. No overnight events, no acute complaints. Denies fever, dizziness, weakness, nausea/vomiting, SOB, CP, LE swelling, palpitations.     Ambulating: Yes  Voiding: Yes  Flatus: Yes  Breast or bottle feeding: Both  Diet: Regular    Physical Exam  Vital Signs Last 24 Hrs  T(F): 97.7 (13 Jul 2019 23:49), Max: 97.7 (13 Jul 2019 23:49)  HR: 75 (13 Jul 2019 23:49) (65 - 75)  BP: 121/63 (13 Jul 2019 23:49) (121/63 - 127/73)  RR: 18 (13 Jul 2019 23:49) (18 - 20)    Gen: AAOx3, NAD  Abd: Soft, nontender, nondistended, BS+  Fundus: Firm, nontender, below the umbilicus  Lochia: Normal  Ext: No calf tenderness, no swelling    Labs:                        10.8   16.42 )-----------( 201      ( 11 Jul 2019 17:53 )             33.7                         11.8   8.98  )-----------( 213      ( 10 Jul 2019 10:00 )             37.6     Fasting FS 7/14@0819 130mg/dL

## 2019-07-14 NOTE — PROGRESS NOTE ADULT - ASSESSMENT
35yo  S/p  PPD #3, with DM2 on insulin, doing well    - Continue current management  - Pain mgt prn  - Encourage ambulation, oral hydration  -F/u fingersticks  -F/u with Froylan Renteria, endocrinologist at [facility]  -Continue insulin regimen Lispro 10/10/10  -D/C home today    Dr. Llanes and Dr. Man to be made aware

## 2019-07-15 ENCOUNTER — APPOINTMENT (OUTPATIENT)
Dept: ANTEPARTUM | Facility: CLINIC | Age: 34
End: 2019-07-15

## 2019-07-15 VITALS
SYSTOLIC BLOOD PRESSURE: 122 MMHG | HEART RATE: 53 BPM | DIASTOLIC BLOOD PRESSURE: 53 MMHG | RESPIRATION RATE: 18 BRPM | TEMPERATURE: 98 F

## 2019-07-15 RX ORDER — INSULIN ASPART 100 [IU]/ML
16 INJECTION, SOLUTION SUBCUTANEOUS
Qty: 0 | Refills: 0 | DISCHARGE

## 2019-07-15 RX ORDER — PIOGLITAZONE HYDROCHLORIDE 15 MG/1
1 TABLET ORAL
Qty: 7 | Refills: 0
Start: 2019-07-15 | End: 2019-07-21

## 2019-07-15 RX ORDER — METFORMIN HYDROCHLORIDE 850 MG/1
1 TABLET ORAL
Qty: 14 | Refills: 0
Start: 2019-07-15 | End: 2019-07-21

## 2019-07-15 RX ORDER — HUMAN INSULIN 100 [IU]/ML
25 INJECTION, SUSPENSION SUBCUTANEOUS
Qty: 0 | Refills: 0 | DISCHARGE

## 2019-07-15 RX ORDER — HUMAN INSULIN 100 [IU]/ML
12 INJECTION, SUSPENSION SUBCUTANEOUS
Qty: 0 | Refills: 0 | DISCHARGE

## 2019-07-15 RX ADMIN — METFORMIN HYDROCHLORIDE 850 MILLIGRAM(S): 850 TABLET ORAL at 06:55

## 2019-07-15 RX ADMIN — Medication 1 TABLET(S): at 13:16

## 2019-07-15 RX ADMIN — PIOGLITAZONE HYDROCHLORIDE 30 MILLIGRAM(S): 15 TABLET ORAL at 13:15

## 2019-07-15 RX ADMIN — Medication 975 MILLIGRAM(S): at 08:38

## 2019-07-15 NOTE — PROGRESS NOTE ADULT - SUBJECTIVE AND OBJECTIVE BOX
JOSE ANGEL LAURIE  34y  Female    PGY1 Note:  33yo PPD#4 s/p . Pt is recovering well, no acute complaints. Pt states her pain is well controlled. Pt denies fever, chills, nausea, vomiting, SOB, chest pain, extremity swelling or pain. Pt was informed of endocrinology recommendations: metformin 850mg BID and pioglitozone 30mg/day. Pt agreed to the medication changes by endrocinology and understood to follow up with endo in 1 week. Pt had one episode of hypoglycemia yesterday afternoon.     Ambulating: Yes  Voiding: Yes  Flatus: Yes  Bowel movements: Yes   Breast or bottle feeding: Both  Diet: Regular    MEDICATIONS  (STANDING):  acetaminophen   Tablet .. 975 milliGRAM(s) Oral <User Schedule>  ibuprofen  Tablet. 600 milliGRAM(s) Oral every 6 hours  metFORMIN 850 milliGRAM(s) Oral two times a day  oxytocin Infusion 333.333 milliUNIT(s)/Min (1000 mL/Hr) IV Continuous <Continuous>  pioglitazone 30 milliGRAM(s) Oral daily  prenatal multivitamin 1 Tablet(s) Oral daily  sodium chloride 0.9% lock flush 3 milliLiter(s) IV Push every 8 hours    MEDICATIONS  (PRN):  benzocaine 20%/menthol 0.5% Spray 1 Spray(s) Topical every 6 hours PRN for Perineal discomfort  dibucaine 1% Ointment 1 Application(s) Topical every 6 hours PRN Perineal discomfort  diphenhydrAMINE 25 milliGRAM(s) Oral every 6 hours PRN Pruritus  docusate sodium 100 milliGRAM(s) Oral two times a day PRN For stool softening  glucagon  Injectable 1 milliGRAM(s) IntraMuscular once PRN Glucose LESS THAN 70 milligrams/deciliter  glycerin Suppository - Adult 1 Suppository(s) Rectal at bedtime PRN Constipation  hydrocortisone 1% Cream 1 Application(s) Topical every 6 hours PRN Moderate Pain (4-6)  lanolin Ointment 1 Application(s) Topical every 6 hours PRN nipple soreness  loratadine 10 milliGRAM(s) Oral daily PRN allergies  magnesium hydroxide Suspension 30 milliLiter(s) Oral two times a day PRN Constipation  oxyCODONE    IR 5 milliGRAM(s) Oral every 3 hours PRN Moderate to Severe Pain (4-10)  oxyCODONE    IR 5 milliGRAM(s) Oral once PRN Moderate to Severe Pain (4-10)  pramoxine 1%/zinc 5% Cream 1 Application(s) Topical every 4 hours PRN Moderate Pain (4-6)  simethicone 80 milliGRAM(s) Chew every 4 hours PRN Gas  witch hazel Pads 1 Application(s) Topical every 4 hours PRN Perineal discomfort      PAST MEDICAL & SURGICAL HISTORY:  Diabetes mellitus  History of D&C: x 2  H/O eye surgery      Physical Exam  Vital Signs Last 24 Hrs  T(C): 36.3 (2019 23:50), Max: 36.4 (2019 08:00)  T(F): 97.3 (2019 23:50), Max: 97.6 (2019 08:00)  HR: 65 (2019 23:50) (58 - 72)  BP: 118/61 (2019 23:50) (112/58 - 130/59)  RR: 18 (2019 23:50) (18 - 18)    Gen: AAOx3, NAD  Fundus: firm, below umbilicus   Abd: Soft, nontender, nondistended, BS+  Lochia: minimal rubra  Ext: No calf tenderness, no swelling    Labs:                        10.8   16.42 )-----------( 201      ( 2019 17:53 )             33.7                         11.8   8.98  )-----------( 213      ( 10 Jul 2019 10:00 )             37.6    POCT glucose:

## 2019-07-15 NOTE — PROGRESS NOTE ADULT - ASSESSMENT
A/P: 35yo  PPD#4 S/P , type II DM on metformin 850mg BID and pioglitozone 30mg/day per endocrinology recommendations, recovering well   - encourage ambulation, PO hydration  - monitor vitals, bleeding  - continue current mangement  - routine postpartum course  - f/u with endocrinology, Dr. Froylan Renteria  - anticipate d/c home today    Dr. Llanes aware and Dr. Segura to be made aware

## 2019-07-18 ENCOUNTER — APPOINTMENT (OUTPATIENT)
Dept: ANTEPARTUM | Facility: CLINIC | Age: 34
End: 2019-07-18

## 2019-07-22 ENCOUNTER — APPOINTMENT (OUTPATIENT)
Dept: ANTEPARTUM | Facility: CLINIC | Age: 34
End: 2019-07-22

## 2019-07-22 DIAGNOSIS — Z3A.37 37 WEEKS GESTATION OF PREGNANCY: ICD-10-CM

## 2019-07-22 DIAGNOSIS — O40.3XX0 POLYHYDRAMNIOS, THIRD TRIMESTER, NOT APPLICABLE OR UNSPECIFIED: ICD-10-CM

## 2019-07-22 DIAGNOSIS — Z79.4 LONG TERM (CURRENT) USE OF INSULIN: ICD-10-CM

## 2019-07-22 DIAGNOSIS — E11.649 TYPE 2 DIABETES MELLITUS WITH HYPOGLYCEMIA WITHOUT COMA: ICD-10-CM

## 2019-07-25 ENCOUNTER — APPOINTMENT (OUTPATIENT)
Dept: ANTEPARTUM | Facility: CLINIC | Age: 34
End: 2019-07-25

## 2019-07-29 ENCOUNTER — APPOINTMENT (OUTPATIENT)
Dept: ANTEPARTUM | Facility: CLINIC | Age: 34
End: 2019-07-29

## 2019-07-31 ENCOUNTER — APPOINTMENT (OUTPATIENT)
Dept: ANTEPARTUM | Facility: CLINIC | Age: 34
End: 2019-07-31

## 2019-08-01 PROBLEM — E11.9 TYPE 2 DIABETES MELLITUS WITHOUT COMPLICATIONS: Chronic | Status: ACTIVE | Noted: 2019-07-10

## 2019-08-22 ENCOUNTER — APPOINTMENT (OUTPATIENT)
Dept: ANTEPARTUM | Facility: CLINIC | Age: 34
End: 2019-08-22

## 2019-10-08 NOTE — ED PROVIDER NOTE - NSCAREINITIATED _GEN_ER
Esmer Reid(Resident) Spiral Flap Text: The defect edges were debeveled with a #15 scalpel blade.  Given the location of the defect, shape of the defect and the proximity to free margins a spiral flap was deemed most appropriate.  Using a sterile surgical marker, an appropriate rotation flap was drawn incorporating the defect and placing the expected incisions within the relaxed skin tension lines where possible. The area thus outlined was incised deep to adipose tissue with a #15 scalpel blade.  The skin margins were undermined to an appropriate distance in all directions utilizing iris scissors.

## 2020-02-07 NOTE — OB PROVIDER H&P - HISTORY OF PRESENT ILLNESS
34y  @ 37weeks by LMP, EDC 19, AMA, h/o uncontrolled DM present for IOL. Patient is seen by [facility] for DM. DM is controlled with insulin. Fasting ranges from 100-180. 2 hrs post prandial ranges from 150-200. FS this morning was 112. Last meal was last night. Last dose of insulin was taken last night. Last seen in HRC 19, exam was deferred. Denies VB, LOF, and ctx. +FM. GBS positive. No complaints at this time. none 34y  @ 37weeks by LMP, EDC 19, AMA, h/o uncontrolled DM present for IOL. Patient is seen by C for DM. DM is controlled with insulin. Fasting ranges from 100-180. 2 hrs post prandial ranges from 150-200. FS this morning was 112. Last meal was last night. Last dose of insulin was taken last night. Last seen in HRC 19, exam was deferred. Polyhydramnios in this pregnancy, at 35.5 weeks, MVP 95mm. Denies VB, LOF, and ctx. +FM. GBS positive. No complaints at this time.

## 2020-12-21 PROBLEM — Z86.19 HISTORY OF CANDIDIASIS OF VAGINA: Status: RESOLVED | Noted: 2018-12-27 | Resolved: 2020-12-21

## 2021-08-09 ENCOUNTER — EMERGENCY (EMERGENCY)
Facility: HOSPITAL | Age: 36
LOS: 0 days | Discharge: HOME | End: 2021-08-09
Attending: EMERGENCY MEDICINE | Admitting: EMERGENCY MEDICINE
Payer: MEDICAID

## 2021-08-09 VITALS
OXYGEN SATURATION: 99 % | TEMPERATURE: 96 F | DIASTOLIC BLOOD PRESSURE: 66 MMHG | HEART RATE: 97 BPM | SYSTOLIC BLOOD PRESSURE: 123 MMHG | WEIGHT: 160.06 LBS | HEIGHT: 61.5 IN | RESPIRATION RATE: 18 BRPM

## 2021-08-09 VITALS
OXYGEN SATURATION: 100 % | RESPIRATION RATE: 18 BRPM | SYSTOLIC BLOOD PRESSURE: 136 MMHG | DIASTOLIC BLOOD PRESSURE: 74 MMHG | HEART RATE: 100 BPM
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
## 2021-08-09 DIAGNOSIS — Z98.890 OTHER SPECIFIED POSTPROCEDURAL STATES: Chronic | ICD-10-CM

## 2021-08-09 DIAGNOSIS — M54.5 LOW BACK PAIN: ICD-10-CM

## 2021-08-09 DIAGNOSIS — L50.9 URTICARIA, UNSPECIFIED: ICD-10-CM

## 2021-08-09 DIAGNOSIS — T78.40XA ALLERGY, UNSPECIFIED, INITIAL ENCOUNTER: ICD-10-CM

## 2021-08-09 DIAGNOSIS — E11.9 TYPE 2 DIABETES MELLITUS WITHOUT COMPLICATIONS: ICD-10-CM

## 2021-08-09 PROCEDURE — 99284 EMERGENCY DEPT VISIT MOD MDM: CPT

## 2021-08-09 RX ORDER — METHOCARBAMOL 500 MG/1
1 TABLET, FILM COATED ORAL
Qty: 15 | Refills: 0
Start: 2021-08-09 | End: 2021-08-13

## 2021-08-09 RX ORDER — METHOCARBAMOL 500 MG/1
1500 TABLET, FILM COATED ORAL ONCE
Refills: 0 | Status: COMPLETED | OUTPATIENT
Start: 2021-08-09 | End: 2021-08-09

## 2021-08-09 RX ORDER — DIPHENHYDRAMINE HCL 50 MG
50 CAPSULE ORAL ONCE
Refills: 0 | Status: COMPLETED | OUTPATIENT
Start: 2021-08-09 | End: 2021-08-09

## 2021-08-09 RX ORDER — DEXAMETHASONE 0.5 MG/5ML
10 ELIXIR ORAL ONCE
Refills: 0 | Status: COMPLETED | OUTPATIENT
Start: 2021-08-09 | End: 2021-08-09

## 2021-08-09 RX ORDER — KETOROLAC TROMETHAMINE 30 MG/ML
30 SYRINGE (ML) INJECTION ONCE
Refills: 0 | Status: DISCONTINUED | OUTPATIENT
Start: 2021-08-09 | End: 2021-08-09

## 2021-08-09 RX ADMIN — Medication 50 MILLIGRAM(S): at 17:59

## 2021-08-09 RX ADMIN — Medication 10 MILLIGRAM(S): at 17:59

## 2021-08-09 RX ADMIN — Medication 30 MILLIGRAM(S): at 16:37

## 2021-08-09 RX ADMIN — METHOCARBAMOL 1500 MILLIGRAM(S): 500 TABLET, FILM COATED ORAL at 16:37
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
## 2021-08-09 NOTE — ED PROVIDER NOTE - NSFOLLOWUPCLINICS_GEN_ALL_ED_FT
[facility] ([facility])  Rehabilitation  [facility] [address] [place] [address]  Phone: [phone]  Fax:   Follow Up Time: 1-3 Days     [facility] ([facility])  Rehabilitation  [facility] [address], [address] [place] [address]  Phone: [phone]  Fax:   Follow Up Time: 1-3 Days

## 2021-08-09 NOTE — ED ADULT NURSE NOTE - NSIMPLEMENTINTERV_GEN_ALL_ED
Implemented All Universal Safety Interventions:  [place] to call system. Call bell, personal items and telephone within reach. Instruct patient to call for assistance. Room bathroom lighting operational. Non-slip footwear when patient is off stretcher. Physically safe environment: no spills, clutter or unnecessary equipment. Stretcher in lowest position, wheels locked, appropriate side rails in place.
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
## 2021-08-09 NOTE — ED PROVIDER NOTE - NS ED ROS FT
Constitutional: (-) weakness  Cardiovascular: (-) chest pain  Respiratory: (-) cough  Musculoskeletal: (-) neck pain, (+) back pain, (-) joint pain,  Integumentary: (-) rash, (-) edema, (-) bruises  Neurological: (-) headache, (-) loc, (-) dizziness, (-) tingling, (-)numbness,

## 2021-08-09 NOTE — ED PROVIDER NOTE - PHYSICAL EXAMINATION
Physical Exam    Vital Signs: I have reviewed the initial vital signs.  Constitutional: well-nourished, appears stated age, no acute distress  Eyes: Conjunctiva pink, Sclera clear.  Cardiovascular: S1 and S2, regular rate, regular rhythm, well-perfused extremities, radial pulses equal and 2+  Respiratory: unlabored respiratory effort, clear to auscultation bilaterally no wheezing, rales and rhonchi  Musculoskeletal: supple neck, no lower extremity edema, no midline tenderness, b/l lumbar paraspinal ttp.   Integumentary: warm, dry, no rash  Neurologic: awake, alert, cranial nerves II-XII grossly intact, extremities’ motor and sensory functions grossly intact

## 2021-08-09 NOTE — ED ADULT NURSE REASSESSMENT NOTE - NS ED NURSE REASSESS COMMENT FT1
MD called to pt after pt developed lip swelling and upper lip rash. pt assessed. vs checked and medications ordered and administered.

## 2021-08-09 NOTE — ED PROVIDER NOTE - BIRTH SEX
Female - Reglan prn  - miralax, senna   - CT A/P w/ PO/IV contrast done showing Overall increase in size of enhancing right renal mass since January 2021, strongly suspicious for renal cell carcinoma. 2. No evidence of omental caking or carcinomatosis. Has renal mass which urology was consulted for and knows about.  Has been biopsied found to be fibroma however given increase in size now suspect RCC and would like her to f/u outpt for surgery vs. IR intervention.  Note written 7/4/21 - Reglan prn  - miralax, senna   - CT A/P w/ PO/IV contrast done showing Overall increase in size of enhancing right renal mass since January 2021, strongly suspicious for renal cell carcinoma. 2. No evidence of omental caking or carcinomatosis. Has renal mass which urology was consulted for and knows about.  Has been biopsied found to be fibroma however given increase in size now suspect RCC and would like her to f/u outpt for surgery vs. IR intervention.  Note written 7/4/21  -Check US to r/o cholelithiasis

## 2021-08-09 NOTE — ED PROVIDER NOTE - PATIENT PORTAL LINK FT
You can access the FollowMyHealth Patient Portal offered by [facility] by registering at the following website: [email or web address]. By joining [employer]’s FollowMyHealth portal, you will also be able to view your health information using other applications (apps) compatible with our system. You can access the FollowMyHealth Patient Portal offered by [facility] by registering at the following website: [email or web address]. By joining [employer]’s FollowMyHealth portal, you will also be able to view your health information using other applications (apps) compatible with our system.

## 2021-08-09 NOTE — ED PROVIDER NOTE - PROGRESS NOTE DETAILS
Dr. Pugh: Notified by nurse that pt is having an allergic reaction.   Hives noticed and pt states some throat scratching. Benadryl and decadron given as pt was waiting to leave, developed allergic reaction, hives to face and itchy. pt given im benadryl and po dex. now feels better, itching resolved, no OP edema speaking full sentences. will d/c robaxin as could have caused reaction. pt without known allergic reaction to drugs or food. pt feels well, sxs resolved, will d/c, op clear no edema.
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
## 2021-09-16 ENCOUNTER — OUTPATIENT (OUTPATIENT)
Dept: OUTPATIENT SERVICES | Facility: HOSPITAL | Age: 36
LOS: 1 days | Discharge: HOME | End: 2021-09-16
Payer: COMMERCIAL

## 2021-09-16 DIAGNOSIS — Z98.890 OTHER SPECIFIED POSTPROCEDURAL STATES: Chronic | ICD-10-CM

## 2021-09-16 DIAGNOSIS — M54.5 LOW BACK PAIN: ICD-10-CM

## 2021-09-16 PROCEDURE — 72110 X-RAY EXAM L-2 SPINE 4/>VWS: CPT | Mod: 26

## 2021-10-21 NOTE — OB PROVIDER TRIAGE NOTE - NSOUTCOME1_OBGYN_ALL_OB
Term Thalidomide Counseling: I discussed with the patient the risks of thalidomide including but not limited to birth defects, anxiety, weakness, chest pain, dizziness, cough and severe allergy.

## 2021-11-17 ENCOUNTER — OUTPATIENT (OUTPATIENT)
Dept: OUTPATIENT SERVICES | Facility: HOSPITAL | Age: 36
LOS: 1 days | Discharge: HOME | End: 2021-11-17

## 2021-11-17 DIAGNOSIS — Z98.890 OTHER SPECIFIED POSTPROCEDURAL STATES: Chronic | ICD-10-CM

## 2021-11-17 DIAGNOSIS — M54.89 OTHER DORSALGIA: ICD-10-CM

## 2022-06-17 ENCOUNTER — EMERGENCY (EMERGENCY)
Facility: HOSPITAL | Age: 37
LOS: 0 days | Discharge: HOME | End: 2022-06-17
Attending: EMERGENCY MEDICINE | Admitting: EMERGENCY MEDICINE
Payer: MEDICAID

## 2022-06-17 VITALS
HEIGHT: 61.5 IN | TEMPERATURE: 98 F | RESPIRATION RATE: 20 BRPM | HEART RATE: 88 BPM | DIASTOLIC BLOOD PRESSURE: 74 MMHG | OXYGEN SATURATION: 98 % | SYSTOLIC BLOOD PRESSURE: 123 MMHG

## 2022-06-17 DIAGNOSIS — E11.65 TYPE 2 DIABETES MELLITUS WITH HYPERGLYCEMIA: ICD-10-CM
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
## 2022-06-17 DIAGNOSIS — Z98.890 OTHER SPECIFIED POSTPROCEDURAL STATES: Chronic | ICD-10-CM

## 2022-06-17 DIAGNOSIS — M25.522 PAIN IN LEFT ELBOW: ICD-10-CM
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
## 2022-06-17 DIAGNOSIS — T14.8XXA OTHER INJURY OF UNSPECIFIED BODY REGION, INITIAL ENCOUNTER: ICD-10-CM

## 2022-06-17 LAB
ALBUMIN SERPL ELPH-MCNC: 4.2 G/DL — SIGNIFICANT CHANGE UP (ref 3.5–5.2)
ALP SERPL-CCNC: 146 U/L — HIGH (ref 30–115)
ALT FLD-CCNC: 20 U/L — SIGNIFICANT CHANGE UP (ref 0–41)
ANION GAP SERPL CALC-SCNC: 18 MMOL/L — HIGH (ref 7–14)
APPEARANCE UR: ABNORMAL
AST SERPL-CCNC: 18 U/L — SIGNIFICANT CHANGE UP (ref 0–41)
B-OH-BUTYR SERPL-SCNC: 1.5 MMOL/L — HIGH
BACTERIA # UR AUTO: ABNORMAL
BASE EXCESS BLDV CALC-SCNC: -1.1 MMOL/L — SIGNIFICANT CHANGE UP (ref -2–3)
BASOPHILS # BLD AUTO: 0.01 K/UL — SIGNIFICANT CHANGE UP (ref 0–0.2)
BASOPHILS NFR BLD AUTO: 0.2 % — SIGNIFICANT CHANGE UP (ref 0–1)
BILIRUB SERPL-MCNC: 0.3 MG/DL — SIGNIFICANT CHANGE UP (ref 0.2–1.2)
BILIRUB UR-MCNC: NEGATIVE — SIGNIFICANT CHANGE UP
BUN SERPL-MCNC: 18 MG/DL — SIGNIFICANT CHANGE UP (ref 10–20)
CA-I SERPL-SCNC: 1.24 MMOL/L — SIGNIFICANT CHANGE UP (ref 1.15–1.33)
CALCIUM SERPL-MCNC: 9.4 MG/DL — SIGNIFICANT CHANGE UP (ref 8.5–10.1)
CHLORIDE SERPL-SCNC: 95 MMOL/L — LOW (ref 98–110)
CO2 SERPL-SCNC: 21 MMOL/L — SIGNIFICANT CHANGE UP (ref 17–32)
COLOR SPEC: ABNORMAL
CREAT SERPL-MCNC: 0.8 MG/DL — SIGNIFICANT CHANGE UP (ref 0.7–1.5)
DIFF PNL FLD: ABNORMAL
EGFR: 97 ML/MIN/1.73M2 — SIGNIFICANT CHANGE UP
EOSINOPHIL # BLD AUTO: 0.02 K/UL — SIGNIFICANT CHANGE UP (ref 0–0.7)
EOSINOPHIL NFR BLD AUTO: 0.4 % — SIGNIFICANT CHANGE UP (ref 0–8)
EPI CELLS # UR: SIGNIFICANT CHANGE UP
GAS PNL BLDV: 132 MMOL/L — LOW (ref 136–145)
GAS PNL BLDV: SIGNIFICANT CHANGE UP
GLUCOSE SERPL-MCNC: 514 MG/DL — CRITICAL HIGH (ref 70–99)
GLUCOSE UR QL: ABNORMAL
HCG SERPL QL: NEGATIVE — SIGNIFICANT CHANGE UP
HCO3 BLDV-SCNC: 24 MMOL/L — SIGNIFICANT CHANGE UP (ref 22–29)
HCT VFR BLD CALC: 39.3 % — SIGNIFICANT CHANGE UP (ref 37–47)
HCT VFR BLDA CALC: 40 % — SIGNIFICANT CHANGE UP (ref 39–51)
HGB BLD CALC-MCNC: 13.3 G/DL — SIGNIFICANT CHANGE UP (ref 12.6–17.4)
HGB BLD-MCNC: 13 G/DL — SIGNIFICANT CHANGE UP (ref 12–16)
IMM GRANULOCYTES NFR BLD AUTO: 0.4 % — HIGH (ref 0.1–0.3)
KETONES UR-MCNC: ABNORMAL
LACTATE BLDV-MCNC: 1.1 MMOL/L — SIGNIFICANT CHANGE UP (ref 0.5–2)
LEUKOCYTE ESTERASE UR-ACNC: ABNORMAL
LYMPHOCYTES # BLD AUTO: 1.55 K/UL — SIGNIFICANT CHANGE UP (ref 1.2–3.4)
LYMPHOCYTES # BLD AUTO: 33.3 % — SIGNIFICANT CHANGE UP (ref 20.5–51.1)
MAGNESIUM SERPL-MCNC: 2.1 MG/DL — SIGNIFICANT CHANGE UP (ref 1.8–2.4)
MCHC RBC-ENTMCNC: 25.9 PG — LOW (ref 27–31)
MCHC RBC-ENTMCNC: 33.1 G/DL — SIGNIFICANT CHANGE UP (ref 32–37)
MCV RBC AUTO: 78.4 FL — LOW (ref 81–99)
MONOCYTES # BLD AUTO: 0.34 K/UL — SIGNIFICANT CHANGE UP (ref 0.1–0.6)
MONOCYTES NFR BLD AUTO: 7.3 % — SIGNIFICANT CHANGE UP (ref 1.7–9.3)
NEUTROPHILS # BLD AUTO: 2.71 K/UL — SIGNIFICANT CHANGE UP (ref 1.4–6.5)
NEUTROPHILS NFR BLD AUTO: 58.4 % — SIGNIFICANT CHANGE UP (ref 42.2–75.2)
NITRITE UR-MCNC: NEGATIVE — SIGNIFICANT CHANGE UP
NRBC # BLD: 0 /100 WBCS — SIGNIFICANT CHANGE UP (ref 0–0)
OSMOLALITY SERPL: 313 MOS/KG — HIGH (ref 275–300)
PCO2 BLDV: 42 MMHG — SIGNIFICANT CHANGE UP (ref 39–42)
PH BLDV: 7.37 — SIGNIFICANT CHANGE UP (ref 7.32–7.43)
PH UR: 5.5 — SIGNIFICANT CHANGE UP (ref 5–8)
PHOSPHATE SERPL-MCNC: 4.1 MG/DL — SIGNIFICANT CHANGE UP (ref 2.1–4.9)
PLATELET # BLD AUTO: 250 K/UL — SIGNIFICANT CHANGE UP (ref 130–400)
PO2 BLDV: 34 MMHG — SIGNIFICANT CHANGE UP
POTASSIUM BLDV-SCNC: 3.8 MMOL/L — SIGNIFICANT CHANGE UP (ref 3.5–5.1)
POTASSIUM SERPL-MCNC: 4.6 MMOL/L — SIGNIFICANT CHANGE UP (ref 3.5–5)
POTASSIUM SERPL-SCNC: 4.6 MMOL/L — SIGNIFICANT CHANGE UP (ref 3.5–5)
PROT SERPL-MCNC: 7.3 G/DL — SIGNIFICANT CHANGE UP (ref 6–8)
PROT UR-MCNC: ABNORMAL
RBC # BLD: 5.01 M/UL — SIGNIFICANT CHANGE UP (ref 4.2–5.4)
RBC # FLD: 12.2 % — SIGNIFICANT CHANGE UP (ref 11.5–14.5)
RBC CASTS # UR COMP ASSIST: >50 /HPF — HIGH (ref 0–4)
SAO2 % BLDV: 63.7 % — SIGNIFICANT CHANGE UP
SODIUM SERPL-SCNC: 134 MMOL/L — LOW (ref 135–146)
SP GR SPEC: 1.04 — HIGH (ref 1.01–1.03)
UROBILINOGEN FLD QL: SIGNIFICANT CHANGE UP
WBC # BLD: 4.65 K/UL — LOW (ref 4.8–10.8)
WBC # FLD AUTO: 4.65 K/UL — LOW (ref 4.8–10.8)

## 2022-06-17 PROCEDURE — 93010 ELECTROCARDIOGRAM REPORT: CPT

## 2022-06-17 PROCEDURE — 99285 EMERGENCY DEPT VISIT HI MDM: CPT

## 2022-06-17 RX ORDER — SODIUM CHLORIDE 9 MG/ML
1000 INJECTION INTRAMUSCULAR; INTRAVENOUS; SUBCUTANEOUS ONCE
Refills: 0 | Status: DISCONTINUED | OUTPATIENT
Start: 2022-06-17 | End: 2022-06-17

## 2022-06-17 RX ORDER — SODIUM CHLORIDE 9 MG/ML
1000 INJECTION INTRAMUSCULAR; INTRAVENOUS; SUBCUTANEOUS ONCE
Refills: 0 | Status: COMPLETED | OUTPATIENT
Start: 2022-06-17 | End: 2022-06-17

## 2022-06-17 RX ORDER — INSULIN HUMAN 100 [IU]/ML
10 INJECTION, SOLUTION SUBCUTANEOUS ONCE
Refills: 0 | Status: COMPLETED | OUTPATIENT
Start: 2022-06-17 | End: 2022-06-17

## 2022-06-17 RX ADMIN — SODIUM CHLORIDE 1000 MILLILITER(S): 9 INJECTION INTRAMUSCULAR; INTRAVENOUS; SUBCUTANEOUS at 20:33

## 2022-06-17 RX ADMIN — INSULIN HUMAN 10 UNIT(S): 100 INJECTION, SOLUTION SUBCUTANEOUS at 20:32

## 2022-06-17 NOTE — ED PROVIDER NOTE - NS ED ATTENDING STATEMENT MOD
This was a shared visit with the KENDAL. I reviewed and verified the documentation and independently performed the documented:

## 2022-06-17 NOTE — ED PROVIDER NOTE - CLINICAL SUMMARY MEDICAL DECISION MAKING FREE TEXT BOX
evaluated for hyperglycemia, lab work obtained did not reveal dka. given insulin with improvement in fs. patient tolerating po. plan is to fu with pmd.
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
## 2022-06-17 NOTE — ED PROVIDER NOTE - PATIENT PORTAL LINK FT
You can access the FollowMyHealth Patient Portal offered by [facility] by registering at the following website: [email or web address]. By joining ’s FollowMyHealth portal, you will also be able to view your health information using other applications (apps) compatible with our system.

## 2022-06-17 NOTE — ED PROVIDER NOTE - CARE PLAN
Principal Discharge DX:	Elevated serum glucose   1 Principal Discharge DX:	Elevated serum glucose  Secondary Diagnosis:	Upper arm joint pain  Secondary Diagnosis:	Contusion
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
## 2022-06-17 NOTE — ED PROVIDER NOTE - CARE PROVIDER_API CALL
Juan Milian  INTERNAL MEDICINE  [address] [place] [address]  Phone: [phone]  Fax: [phone]  Follow Up Time:

## 2022-06-19 LAB
CULTURE RESULTS: SIGNIFICANT CHANGE UP
SPECIMEN SOURCE: SIGNIFICANT CHANGE UP

## 2022-07-24 ENCOUNTER — INPATIENT (INPATIENT)
Facility: HOSPITAL | Age: 37
LOS: 1 days | Discharge: HOME | End: 2022-07-26
Attending: INTERNAL MEDICINE | Admitting: INTERNAL MEDICINE

## 2022-07-24 VITALS
RESPIRATION RATE: 18 BRPM | HEART RATE: 91 BPM | TEMPERATURE: 98 F | WEIGHT: 156.09 LBS | HEIGHT: 61.5 IN | OXYGEN SATURATION: 98 % | SYSTOLIC BLOOD PRESSURE: 124 MMHG | DIASTOLIC BLOOD PRESSURE: 69 MMHG

## 2022-07-24 DIAGNOSIS — Z98.890 OTHER SPECIFIED POSTPROCEDURAL STATES: Chronic | ICD-10-CM

## 2022-07-24 LAB
ALBUMIN SERPL ELPH-MCNC: 4 G/DL — SIGNIFICANT CHANGE UP (ref 3.5–5.2)
ALP SERPL-CCNC: 132 U/L — HIGH (ref 30–115)
ALT FLD-CCNC: 15 U/L — SIGNIFICANT CHANGE UP (ref 0–41)
ANION GAP SERPL CALC-SCNC: 20 MMOL/L — HIGH (ref 7–14)
APPEARANCE UR: CLEAR — SIGNIFICANT CHANGE UP
AST SERPL-CCNC: 11 U/L — SIGNIFICANT CHANGE UP (ref 0–41)
B-OH-BUTYR SERPL-SCNC: 3.7 MMOL/L — HIGH
BACTERIA # UR AUTO: ABNORMAL
BASE EXCESS BLDV CALC-SCNC: -7.9 MMOL/L — LOW (ref -2–3)
BASOPHILS # BLD AUTO: 0.02 K/UL — SIGNIFICANT CHANGE UP (ref 0–0.2)
BASOPHILS NFR BLD AUTO: 0.4 % — SIGNIFICANT CHANGE UP (ref 0–1)
BILIRUB SERPL-MCNC: 0.3 MG/DL — SIGNIFICANT CHANGE UP (ref 0.2–1.2)
BILIRUB UR-MCNC: NEGATIVE — SIGNIFICANT CHANGE UP
BLD GP AB SCN SERPL QL: SIGNIFICANT CHANGE UP
BUN SERPL-MCNC: 15 MG/DL — SIGNIFICANT CHANGE UP (ref 10–20)
CA-I SERPL-SCNC: 1.12 MMOL/L — LOW (ref 1.15–1.33)
CALCIUM SERPL-MCNC: 9.2 MG/DL — SIGNIFICANT CHANGE UP (ref 8.5–10.1)
CHLORIDE SERPL-SCNC: 96 MMOL/L — LOW (ref 98–110)
CO2 SERPL-SCNC: 17 MMOL/L — SIGNIFICANT CHANGE UP (ref 17–32)
COLOR SPEC: SIGNIFICANT CHANGE UP
CREAT SERPL-MCNC: 0.9 MG/DL — SIGNIFICANT CHANGE UP (ref 0.7–1.5)
DIFF PNL FLD: NEGATIVE — SIGNIFICANT CHANGE UP
EGFR: 83 ML/MIN/1.73M2 — SIGNIFICANT CHANGE UP
EOSINOPHIL # BLD AUTO: 0.01 K/UL — SIGNIFICANT CHANGE UP (ref 0–0.7)
EOSINOPHIL NFR BLD AUTO: 0.2 % — SIGNIFICANT CHANGE UP (ref 0–8)
EPI CELLS # UR: 12 /HPF — HIGH (ref 0–5)
GAS PNL BLDV: 134 MMOL/L — LOW (ref 136–145)
GAS PNL BLDV: SIGNIFICANT CHANGE UP
GLUCOSE BLDC GLUCOMTR-MCNC: 306 MG/DL — HIGH (ref 70–99)
GLUCOSE SERPL-MCNC: 513 MG/DL — CRITICAL HIGH (ref 70–99)
GLUCOSE UR QL: ABNORMAL
HCG SERPL-ACNC: 904.7 MIU/ML — HIGH
HCO3 BLDV-SCNC: 18 MMOL/L — LOW (ref 22–29)
HCT VFR BLD CALC: 39.9 % — SIGNIFICANT CHANGE UP (ref 37–47)
HCT VFR BLDA CALC: 39 % — SIGNIFICANT CHANGE UP (ref 39–51)
HGB BLD CALC-MCNC: 13 G/DL — SIGNIFICANT CHANGE UP (ref 12.6–17.4)
HGB BLD-MCNC: 13.2 G/DL — SIGNIFICANT CHANGE UP (ref 12–16)
HYALINE CASTS # UR AUTO: 1 /LPF — SIGNIFICANT CHANGE UP (ref 0–7)
IMM GRANULOCYTES NFR BLD AUTO: 0.4 % — HIGH (ref 0.1–0.3)
KETONES UR-MCNC: ABNORMAL
LACTATE BLDV-MCNC: 0.9 MMOL/L — SIGNIFICANT CHANGE UP (ref 0.5–2)
LEUKOCYTE ESTERASE UR-ACNC: ABNORMAL
LIDOCAIN IGE QN: 36 U/L — SIGNIFICANT CHANGE UP (ref 7–60)
LYMPHOCYTES # BLD AUTO: 1.41 K/UL — SIGNIFICANT CHANGE UP (ref 1.2–3.4)
LYMPHOCYTES # BLD AUTO: 25.2 % — SIGNIFICANT CHANGE UP (ref 20.5–51.1)
MCHC RBC-ENTMCNC: 25.8 PG — LOW (ref 27–31)
MCHC RBC-ENTMCNC: 33.1 G/DL — SIGNIFICANT CHANGE UP (ref 32–37)
MCV RBC AUTO: 77.9 FL — LOW (ref 81–99)
MONOCYTES # BLD AUTO: 0.46 K/UL — SIGNIFICANT CHANGE UP (ref 0.1–0.6)
MONOCYTES NFR BLD AUTO: 8.2 % — SIGNIFICANT CHANGE UP (ref 1.7–9.3)
NEUTROPHILS # BLD AUTO: 3.67 K/UL — SIGNIFICANT CHANGE UP (ref 1.4–6.5)
NEUTROPHILS NFR BLD AUTO: 65.6 % — SIGNIFICANT CHANGE UP (ref 42.2–75.2)
NITRITE UR-MCNC: NEGATIVE — SIGNIFICANT CHANGE UP
NRBC # BLD: 0 /100 WBCS — SIGNIFICANT CHANGE UP (ref 0–0)
PCO2 BLDV: 39 MMHG — SIGNIFICANT CHANGE UP (ref 39–42)
PH BLDV: 7.28 — LOW (ref 7.32–7.43)
PH UR: 6 — SIGNIFICANT CHANGE UP (ref 5–8)
PLATELET # BLD AUTO: 241 K/UL — SIGNIFICANT CHANGE UP (ref 130–400)
PO2 BLDV: 24 MMHG — SIGNIFICANT CHANGE UP
POTASSIUM BLDV-SCNC: 3.4 MMOL/L — LOW (ref 3.5–5.1)
POTASSIUM SERPL-MCNC: 4 MMOL/L — SIGNIFICANT CHANGE UP (ref 3.5–5)
POTASSIUM SERPL-SCNC: 4 MMOL/L — SIGNIFICANT CHANGE UP (ref 3.5–5)
PROT SERPL-MCNC: 6.9 G/DL — SIGNIFICANT CHANGE UP (ref 6–8)
PROT UR-MCNC: NEGATIVE — SIGNIFICANT CHANGE UP
RBC # BLD: 5.12 M/UL — SIGNIFICANT CHANGE UP (ref 4.2–5.4)
RBC # FLD: 12.3 % — SIGNIFICANT CHANGE UP (ref 11.5–14.5)
RBC CASTS # UR COMP ASSIST: 1 /HPF — SIGNIFICANT CHANGE UP (ref 0–4)
SAO2 % BLDV: 30.7 % — SIGNIFICANT CHANGE UP
SODIUM SERPL-SCNC: 133 MMOL/L — LOW (ref 135–146)
SP GR SPEC: 1.03 — HIGH (ref 1.01–1.03)
UROBILINOGEN FLD QL: SIGNIFICANT CHANGE UP
WBC # BLD: 5.59 K/UL — SIGNIFICANT CHANGE UP (ref 4.8–10.8)
WBC # FLD AUTO: 5.59 K/UL — SIGNIFICANT CHANGE UP (ref 4.8–10.8)
WBC UR QL: 51 /HPF — HIGH (ref 0–5)

## 2022-07-24 PROCEDURE — 76830 TRANSVAGINAL US NON-OB: CPT | Mod: 26

## 2022-07-24 PROCEDURE — 99285 EMERGENCY DEPT VISIT HI MDM: CPT

## 2022-07-24 RX ORDER — INSULIN HUMAN 100 [IU]/ML
10 INJECTION, SOLUTION SUBCUTANEOUS ONCE
Refills: 0 | Status: COMPLETED | OUTPATIENT
Start: 2022-07-24 | End: 2022-07-24

## 2022-07-24 RX ORDER — INSULIN HUMAN 100 [IU]/ML
7 INJECTION, SOLUTION SUBCUTANEOUS
Qty: 100 | Refills: 0 | Status: DISCONTINUED | OUTPATIENT
Start: 2022-07-24 | End: 2022-07-25

## 2022-07-24 RX ORDER — SODIUM CHLORIDE 9 MG/ML
1000 INJECTION INTRAMUSCULAR; INTRAVENOUS; SUBCUTANEOUS ONCE
Refills: 0 | Status: COMPLETED | OUTPATIENT
Start: 2022-07-24 | End: 2022-07-24

## 2022-07-24 RX ORDER — SODIUM CHLORIDE 9 MG/ML
1000 INJECTION, SOLUTION INTRAVENOUS
Refills: 0 | Status: DISCONTINUED | OUTPATIENT
Start: 2022-07-24 | End: 2022-07-25

## 2022-07-24 RX ORDER — SODIUM CHLORIDE 9 MG/ML
1000 INJECTION, SOLUTION INTRAVENOUS
Refills: 0 | Status: DISCONTINUED | OUTPATIENT
Start: 2022-07-24 | End: 2022-07-24

## 2022-07-24 RX ORDER — CEFTRIAXONE 500 MG/1
1000 INJECTION, POWDER, FOR SOLUTION INTRAMUSCULAR; INTRAVENOUS ONCE
Refills: 0 | Status: COMPLETED | OUTPATIENT
Start: 2022-07-24 | End: 2022-07-24

## 2022-07-24 RX ADMIN — CEFTRIAXONE 100 MILLIGRAM(S): 500 INJECTION, POWDER, FOR SOLUTION INTRAMUSCULAR; INTRAVENOUS at 18:35

## 2022-07-24 RX ADMIN — INSULIN HUMAN 10 UNIT(S): 100 INJECTION, SOLUTION SUBCUTANEOUS at 20:05

## 2022-07-24 RX ADMIN — SODIUM CHLORIDE 1000 MILLILITER(S): 9 INJECTION INTRAMUSCULAR; INTRAVENOUS; SUBCUTANEOUS at 20:15

## 2022-07-24 RX ADMIN — SODIUM CHLORIDE 1000 MILLILITER(S): 9 INJECTION INTRAMUSCULAR; INTRAVENOUS; SUBCUTANEOUS at 16:54

## 2022-07-24 NOTE — H&P ADULT - NSHPPHYSICALEXAM_GEN_ALL_CORE
GENERAL: NAD, lying in bed comfortably  HEAD:  Atraumatic, Normocephalic  EYES: Sclera clear  ENT: Moist mucous membranes  NECK: Supple, No JVD  CHEST/LUNG: Clear to auscultation bilaterally;    HEART: Regular rate and rhythm;    ABDOMEN: Soft, Nontender, Nondistended.   EXTREMITIES: No LE edema  NERVOUS SYSTEM:  Alert & Oriented X3, speech clear. No deficits

## 2022-07-24 NOTE — ED PROVIDER NOTE - OBJECTIVE STATEMENT
pt with poorly controlled DM due to medication noncompliance,  with LMP 22 presents to ED requesting pregnancy test as tests she took at home were negative. she is experiencing pelvic cramping, spotting and nausea for the last week. Denies fever/chill/HA/dizziness/chest pain/palpitation/sob/d/ black stool/bloody stool/urinary sxs

## 2022-07-24 NOTE — ED ADULT TRIAGE NOTE - CHIEF COMPLAINT QUOTE
Patient presents to ED c/o abdominal cramping, back pain, nausea and states LMP was 6/14. Patient requests serum pregnancy test after receiving a negative result from urine

## 2022-07-24 NOTE — H&P ADULT - ASSESSMENT
36 y/o F w/ pmhx of DM & OB/GYN hx of  (with LMP 22) presents to ED requesting pregnancy test as tests she took at home were negative. she is experiencing pelvic cramping, spotting and nausea for the last week.    OB/GYN consulted for evaluation of vaginal spotting in the setting of elevated HCG.    Denies     ED:   - VS: 124/69, HR 91, 98F, 98% on RA  - Labs: Glu 513, BHB 3.7, Na 140 (corrected), AG 20, , pH 7.28, UA(+)    - Transvaginal US:   ·	No evidence of intrauterine pregnancy.   ·	Left paraovarian 1.5 cm cystic structure (paraovarian cyst versus early ectopic pregnancy).    Admit to ICU    IMPRESSION:   DKA  HO T2DM  Elevated HCG (Paraovarian cyst vs Rctopic pregnancy)        PLAN:   CNS - Avoid sedatives    HEENT - Oral care    PULMONARY - Aspiration precautions     CARDIOVASCULAR - Monitor vitals;     GI - NPO (advance diet as tolerated); GI ppx; PRN Zofran (monitor Qtc)    RENAL  strict I&Os and monitor volume status  - Follow up renal function and lytes, correct as needed (K>4; Mg>2)  - c/w tacrolimus 2mg PO BID  - obtain tacrolimus and MPA level (reports not taking MPA, but MPA listed on SureScripts)  - nephrology consulted (s/p renal transplant); f/u recs    INFECTIOUS DISEASE    - f/u UA, UCx, and BCx  - monitor VS    HEMATOLOGICAL  - DVT ppx    ENDOCRINE  - insulin gtt as per DKA protocol  - BMP q6h to monitor AG  - reglan IV 5mg PO Q8H prn nausea/vomiting (suspect diabetic gastroparesis)  - obtain A1c    DISPOSITION: MICU 36 y/o F w/ pmhx of DM & OB/GYN hx of  (with LMP 22) presents to ED requesting pregnancy test as tests she took at home were negative. she is experiencing pelvic cramping, spotting and nausea for the last week.    OB/GYN consulted for evaluation of vaginal spotting in the setting of elevated HCG.    Denies     ED:   - VS: 124/69, HR 91, 98F, 98% on RA  - Labs: Glu 513, BHB 3.7, Na 140 (corrected), AG 20, , pH 7.28, UA(+)    - Transvaginal US:   ·	No evidence of intrauterine pregnancy.   ·	Left paraovarian 1.5 cm cystic structure (paraovarian cyst versus early ectopic pregnancy).    Admit to ICU    IMPRESSION:   Moderate DKA  HO T2DM  Elevated HCG (Paraovarian cyst vs Rctopic pregnancy)        PLAN:     # CNS - Avoid sedatives    # HEENT - Oral care    # PULMONARY - Aspiration precautions     # CARDIOVASCULAR - Monitor vitals;     # GI - NPO (advance diet as tolerated); GI ppx; PRN Zofran (monitor Qtc)    # RENAL - Strict I&Os; c/w D5+0.45NS w/ K+    # INFECTIOUS DISEASE - c/w 7-day Empirical ; fu Ucx    # HEMATOLOGICAL - DVT ppx    # ENDOCRINE - Insulin gtt as per DKA protocol; BMP q6h to monitor AG & K+; PRN Zofran for N/V; fu A1c    DISPO: MICU 36 y/o F w/ pmhx of T2DM & OB/GYN hx of  (with LMP 22) presents to ED after having what she believed were "pregnancy symptoms" (bloating & cramps for ~2wks) in addition to missing her period & vaginal spotting.   Admitted to MICU for DKA.      IMPRESSION:     Moderate DKA 2/2 Medication Noncompliance  Pregnancy of Unknown Location  HO Insulin Dependent T2DM    PLAN:     # CNS - Avoid sedatives    # HEENT - Oral care    # PULMONARY - Aspiration precautions;     # CARDIOVASCULAR - Monitor vitals;     # GI - NPO (advance diet as tolerated); GI ppxif ICU stay is prolonged;     # RENAL - Strict I&Os; c/w D5+0.45NS w/ K+    # INFECTIOUS DISEASE - c/w 7-day Empirical Augmentin; fu Ucx    # HEMATOLOGICAL - DVT ppx    # ENDOCRINE - Insulin gtt as per DKA protocol; BMP q6h to monitor AG & K+; PRN Zofran for N/V; fu A1c; fu Endo consult     # OB/GYN - Repeat HCG in 48hrs; fu OB    DISPO: MICU 36 y/o F w/ pmhx of T2DM & OB/GYN hx of  (with LMP 22) presents to ED after having what she believed were "pregnancy symptoms" (bloating & cramps for ~2wks) in addition to missing her period & vaginal spotting.   Admitted to MICU for DKA.      IMPRESSION:     DKA 2/2 Medication Noncompliance  Pregnancy of Unknown Location  HO Insulin Dependent T2DM    PLAN:     # CNS - Avoid sedatives    # HEENT - Oral care    # PULMONARY - Aspiration precautions;     # CARDIOVASCULAR - Monitor vitals;     # GI - NPO (advance diet as tolerated); GI ppxif ICU stay is prolonged;     # RENAL - Strict I&Os; c/w D5+0.45NS w/ K+    # INFECTIOUS DISEASE - c/w 7-day Empirical Augmentin; fu Ucx    # HEMATOLOGICAL - DVT ppx    # ENDOCRINE - Insulin gtt as per DKA protocol; BMP q6h to monitor AG & K+; PRN Zofran for N/V; fu A1c; fu Endo consult     # OB/GYN - Repeat HCG in 48hrs; fu OB    DISPO: MICU

## 2022-07-24 NOTE — ED PROVIDER NOTE - CLINICAL SUMMARY MEDICAL DECISION MAKING FREE TEXT BOX
Received sign out on patient who is 38 yo woman with poorly controlled DM mostly due to poor compliance and now with early pregnancy.  Now with pelvic cramping as well.  Workup revealed DKA.  Insulin drip, ICU admission, OB consultation.

## 2022-07-24 NOTE — ED PROVIDER NOTE - NS ED ROS FT
Constitutional: no fever, chills, no recent weight loss, change in appetite or malaise  Eyes: no redness/discharge/pain/vision changes  ENT: no rhinorrhea/ear pain/sore throat  Cardiac: No chest pain, SOB or edema.  Respiratory: No cough or respiratory distress  GI: see hpi  : No dysuria, frequency, urgency or hematuria  MS: no pain to back or extremities, no loss of ROM, no weakness  Neuro: No headache or weakness. No LOC.  Skin: No skin rash.  Except as documented in the HPI, all other systems are negative.

## 2022-07-24 NOTE — H&P ADULT - ATTENDING COMMENTS
events noted, DKA not compliant, pregnant?, repeat CMP this am if G closed, start insulin sq dc IVF, Endo eval, downgrade to floor when off insulin, Gyn FUP

## 2022-07-24 NOTE — H&P ADULT - HISTORY OF PRESENT ILLNESS
38 y/o F w/ pmhx of DM & OB/GYN hx of  (with LMP 22) presents to ED requesting pregnancy test as tests she took at home were negative. she is experiencing pelvic cramping, spotting and nausea for the last week.    OB/GYN consulted for evaluation of vaginal spotting in the setting of elevated HCG.    Denies     ED:   - VS: 124/69, HR 91, 98F, 98% on RA  - Labs: Glu 513, BHB 3.7, Na 140 (corrected), AG 20, , pH 7.28, UA(+)    - Transvaginal US:   ·	No evidence of intrauterine pregnancy.   ·	Left paraovarian 1.5 cm cystic structure (paraovarian cyst versus early ectopic pregnancy).    Admit to ICU   38 y/o F w/ pmhx of T2DM & OB/GYN hx of  (with LMP 22) presents to ED requesting pregnancy test as tests she took at home were negative. she is experiencing pelvic cramping, spotting and nausea for the last week.    OB/GYN consulted for evaluation of vaginal spotting in the setting of elevated HCG.    Denies     ED:   - VS: 124/69, HR 91, 98F, 98% on RA  - Labs: Glu 513, BHB 3.7, Na 140 (corrected), AG 20, , pH 7.28, UA(+)    - Transvaginal US:   ·	No evidence of intrauterine pregnancy.   ·	Left paraovarian 1.5 cm cystic structure (paraovarian cyst versus early ectopic pregnancy).    Admit to ICU   36 y/o F w/ pmhx of T2DM & OB/GYN hx of  (with LMP 22) presents to ED after having what she believed were "pregnancy symptoms" (bloating & cramps for ~2wks) in addition to missing her period & vaginal spotting.     Patient admits to being non-compliant on her T2DM home regimen (Trulicity, Metformin & Basaglar). She did not seem to care about controlling her DM stating "I'll be fine" when warned about possibly fatal consequences of DKA.     OB/GYN consulted for evaluation of vaginal spotting in the setting of elevated HCG.    Denies abdominal pain, N/V, confusion, palpitations or chest pain.     Med Rec completed w/ patient at bedside; However she does not remember her home dose for Trulicity;     ED:   - VS: 124/69, HR 91, 98F, 98% on RA  - Labs: Glu 513, BHB 3.7, Na 140 (corrected), AG 20, , pH 7.28, UA(+)    - Transvaginal US:   ·	No evidence of intrauterine pregnancy.   ·	Left paraovarian 1.5 cm cystic structure (paraovarian cyst versus early ectopic pregnancy).    As per my d/w OB/GYN team in ED, at this point the pregnancy is of unknown location since HCG needs to reach ~3500 to have any intrauterine growth visible on US>> They will manage conservatively and continue to monitor as DKA is being treated.     Admit to ICU
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
## 2022-07-24 NOTE — ED PROVIDER NOTE - ATTENDING APP SHARED VISIT CONTRIBUTION OF CARE
37-year-old  female presents to ED for pregnancy test.  Patient states she last had her period .  She took a pregnancy test at home which was negative but she thinks she might be pregnant.  Patient's been having spotting on and off for the past few weeks.  Lower abdominal cramping breast tenderness and nausea.  She feels like she is pregnant.  Chest pain shortness of breath dysuria increased frequency hematuria.    Const: NAD  Eyes: PERRL, no conjunctival injection  HENT:  Neck supple without meningismus   CV: RRR, Warm, well-perfused extremities  RESP: CTA B/L, no tachypnea   GI: soft, non-tender, non-distended  MSK: No gross deformities appreciated  Skin: Warm, dry. No rashes  Neuro: Alert, CNs II-XII grossly intact. Sensation and motor function of extremities grossly intact.  Psych: Appropriate mood and affect.

## 2022-07-24 NOTE — ED PROVIDER NOTE - PHYSICAL EXAMINATION
CONSTITUTIONAL: Well-appearing; well-nourished; in no apparent distress.   NECK: Supple; non-tender; no cervical lymphadenopathy.   CARDIOVASCULAR: Normal S1, S2; no murmurs, rubs, or gallops.   RESPIRATORY: Normal chest excursion with respiration; breath sounds clear and equal bilaterally; no wheezes, rhonchi, or rales.  GI/: non-distended; non-tender; no palpable organomegaly.   MS: No CVA tenderness. Normal ROM in all four extremities; non-tender to palpation; distal pulses are normal.   SKIN: Normal for age and race; warm; dry; good turgor; no apparent lesions or exudate.   NEURO/PSYCH: A & O x 4; grossly unremarkable. mood and manner are appropriate.

## 2022-07-25 LAB
A1C WITH ESTIMATED AVERAGE GLUCOSE RESULT: >15.5 % — HIGH (ref 4–5.6)
ANION GAP SERPL CALC-SCNC: 11 MMOL/L — SIGNIFICANT CHANGE UP (ref 7–14)
ANION GAP SERPL CALC-SCNC: 12 MMOL/L — SIGNIFICANT CHANGE UP (ref 7–14)
ANION GAP SERPL CALC-SCNC: 16 MMOL/L — HIGH (ref 7–14)
BASOPHILS # BLD AUTO: 0.01 K/UL — SIGNIFICANT CHANGE UP (ref 0–0.2)
BASOPHILS NFR BLD AUTO: 0.1 % — SIGNIFICANT CHANGE UP (ref 0–1)
BUN SERPL-MCNC: 10 MG/DL — SIGNIFICANT CHANGE UP (ref 10–20)
BUN SERPL-MCNC: 11 MG/DL — SIGNIFICANT CHANGE UP (ref 10–20)
BUN SERPL-MCNC: 12 MG/DL — SIGNIFICANT CHANGE UP (ref 10–20)
CALCIUM SERPL-MCNC: 8 MG/DL — LOW (ref 8.5–10.1)
CALCIUM SERPL-MCNC: 8.4 MG/DL — LOW (ref 8.5–10.1)
CALCIUM SERPL-MCNC: 8.5 MG/DL — SIGNIFICANT CHANGE UP (ref 8.5–10.1)
CHLORIDE SERPL-SCNC: 106 MMOL/L — SIGNIFICANT CHANGE UP (ref 98–110)
CHLORIDE SERPL-SCNC: 107 MMOL/L — SIGNIFICANT CHANGE UP (ref 98–110)
CHLORIDE SERPL-SCNC: 108 MMOL/L — SIGNIFICANT CHANGE UP (ref 98–110)
CO2 SERPL-SCNC: 15 MMOL/L — LOW (ref 17–32)
CO2 SERPL-SCNC: 19 MMOL/L — SIGNIFICANT CHANGE UP (ref 17–32)
CO2 SERPL-SCNC: 19 MMOL/L — SIGNIFICANT CHANGE UP (ref 17–32)
CREAT SERPL-MCNC: 0.5 MG/DL — LOW (ref 0.7–1.5)
CREAT SERPL-MCNC: 0.6 MG/DL — LOW (ref 0.7–1.5)
CREAT SERPL-MCNC: 0.7 MG/DL — SIGNIFICANT CHANGE UP (ref 0.7–1.5)
EGFR: 113 ML/MIN/1.73M2 — SIGNIFICANT CHANGE UP
EGFR: 117 ML/MIN/1.73M2 — SIGNIFICANT CHANGE UP
EGFR: 122 ML/MIN/1.73M2 — SIGNIFICANT CHANGE UP
EOSINOPHIL # BLD AUTO: 0.04 K/UL — SIGNIFICANT CHANGE UP (ref 0–0.7)
EOSINOPHIL NFR BLD AUTO: 0.6 % — SIGNIFICANT CHANGE UP (ref 0–8)
ESTIMATED AVERAGE GLUCOSE: >398 MG/DL — HIGH (ref 68–114)
GLUCOSE BLDC GLUCOMTR-MCNC: 112 MG/DL — HIGH (ref 70–99)
GLUCOSE BLDC GLUCOMTR-MCNC: 137 MG/DL — HIGH (ref 70–99)
GLUCOSE BLDC GLUCOMTR-MCNC: 150 MG/DL — HIGH (ref 70–99)
GLUCOSE BLDC GLUCOMTR-MCNC: 153 MG/DL — HIGH (ref 70–99)
GLUCOSE BLDC GLUCOMTR-MCNC: 165 MG/DL — HIGH (ref 70–99)
GLUCOSE BLDC GLUCOMTR-MCNC: 167 MG/DL — HIGH (ref 70–99)
GLUCOSE BLDC GLUCOMTR-MCNC: 175 MG/DL — HIGH (ref 70–99)
GLUCOSE BLDC GLUCOMTR-MCNC: 186 MG/DL — HIGH (ref 70–99)
GLUCOSE BLDC GLUCOMTR-MCNC: 189 MG/DL — HIGH (ref 70–99)
GLUCOSE BLDC GLUCOMTR-MCNC: 222 MG/DL — HIGH (ref 70–99)
GLUCOSE BLDC GLUCOMTR-MCNC: 313 MG/DL — HIGH (ref 70–99)
GLUCOSE BLDC GLUCOMTR-MCNC: 317 MG/DL — HIGH (ref 70–99)
GLUCOSE BLDC GLUCOMTR-MCNC: 326 MG/DL — HIGH (ref 70–99)
GLUCOSE BLDC GLUCOMTR-MCNC: 81 MG/DL — SIGNIFICANT CHANGE UP (ref 70–99)
GLUCOSE BLDC GLUCOMTR-MCNC: 84 MG/DL — SIGNIFICANT CHANGE UP (ref 70–99)
GLUCOSE BLDC GLUCOMTR-MCNC: 97 MG/DL — SIGNIFICANT CHANGE UP (ref 70–99)
GLUCOSE SERPL-MCNC: 105 MG/DL — HIGH (ref 70–99)
GLUCOSE SERPL-MCNC: 114 MG/DL — HIGH (ref 70–99)
GLUCOSE SERPL-MCNC: 338 MG/DL — HIGH (ref 70–99)
HCT VFR BLD CALC: 35.8 % — LOW (ref 37–47)
HGB BLD-MCNC: 11.8 G/DL — LOW (ref 12–16)
IMM GRANULOCYTES NFR BLD AUTO: 0.6 % — HIGH (ref 0.1–0.3)
LYMPHOCYTES # BLD AUTO: 2.45 K/UL — SIGNIFICANT CHANGE UP (ref 1.2–3.4)
LYMPHOCYTES # BLD AUTO: 36.7 % — SIGNIFICANT CHANGE UP (ref 20.5–51.1)
MAGNESIUM SERPL-MCNC: 1.8 MG/DL — SIGNIFICANT CHANGE UP (ref 1.8–2.4)
MCHC RBC-ENTMCNC: 25.9 PG — LOW (ref 27–31)
MCHC RBC-ENTMCNC: 33 G/DL — SIGNIFICANT CHANGE UP (ref 32–37)
MCV RBC AUTO: 78.5 FL — LOW (ref 81–99)
MONOCYTES # BLD AUTO: 0.71 K/UL — HIGH (ref 0.1–0.6)
MONOCYTES NFR BLD AUTO: 10.6 % — HIGH (ref 1.7–9.3)
NEUTROPHILS # BLD AUTO: 3.42 K/UL — SIGNIFICANT CHANGE UP (ref 1.4–6.5)
NEUTROPHILS NFR BLD AUTO: 51.4 % — SIGNIFICANT CHANGE UP (ref 42.2–75.2)
NRBC # BLD: 0 /100 WBCS — SIGNIFICANT CHANGE UP (ref 0–0)
PLATELET # BLD AUTO: 220 K/UL — SIGNIFICANT CHANGE UP (ref 130–400)
POTASSIUM SERPL-MCNC: 3.8 MMOL/L — SIGNIFICANT CHANGE UP (ref 3.5–5)
POTASSIUM SERPL-MCNC: 4 MMOL/L — SIGNIFICANT CHANGE UP (ref 3.5–5)
POTASSIUM SERPL-MCNC: 4 MMOL/L — SIGNIFICANT CHANGE UP (ref 3.5–5)
POTASSIUM SERPL-SCNC: 3.8 MMOL/L — SIGNIFICANT CHANGE UP (ref 3.5–5)
POTASSIUM SERPL-SCNC: 4 MMOL/L — SIGNIFICANT CHANGE UP (ref 3.5–5)
POTASSIUM SERPL-SCNC: 4 MMOL/L — SIGNIFICANT CHANGE UP (ref 3.5–5)
RBC # BLD: 4.56 M/UL — SIGNIFICANT CHANGE UP (ref 4.2–5.4)
RBC # FLD: 12.4 % — SIGNIFICANT CHANGE UP (ref 11.5–14.5)
SARS-COV-2 RNA SPEC QL NAA+PROBE: SIGNIFICANT CHANGE UP
SODIUM SERPL-SCNC: 136 MMOL/L — SIGNIFICANT CHANGE UP (ref 135–146)
SODIUM SERPL-SCNC: 138 MMOL/L — SIGNIFICANT CHANGE UP (ref 135–146)
SODIUM SERPL-SCNC: 139 MMOL/L — SIGNIFICANT CHANGE UP (ref 135–146)
WBC # BLD: 6.67 K/UL — SIGNIFICANT CHANGE UP (ref 4.8–10.8)
WBC # FLD AUTO: 6.67 K/UL — SIGNIFICANT CHANGE UP (ref 4.8–10.8)

## 2022-07-25 PROCEDURE — 99223 1ST HOSP IP/OBS HIGH 75: CPT

## 2022-07-25 PROCEDURE — 99221 1ST HOSP IP/OBS SF/LOW 40: CPT

## 2022-07-25 RX ORDER — ONDANSETRON 8 MG/1
4 TABLET, FILM COATED ORAL
Refills: 0 | Status: DISCONTINUED | OUTPATIENT
Start: 2022-07-25 | End: 2022-07-26

## 2022-07-25 RX ORDER — INSULIN LISPRO 100/ML
10 VIAL (ML) SUBCUTANEOUS ONCE
Refills: 0 | Status: COMPLETED | OUTPATIENT
Start: 2022-07-25 | End: 2022-07-25

## 2022-07-25 RX ORDER — DEXTROSE 50 % IN WATER 50 %
25 SYRINGE (ML) INTRAVENOUS ONCE
Refills: 0 | Status: DISCONTINUED | OUTPATIENT
Start: 2022-07-25 | End: 2022-07-26

## 2022-07-25 RX ORDER — SODIUM CHLORIDE 9 MG/ML
1000 INJECTION, SOLUTION INTRAVENOUS
Refills: 0 | Status: DISCONTINUED | OUTPATIENT
Start: 2022-07-25 | End: 2022-07-26

## 2022-07-25 RX ORDER — GLUCAGON INJECTION, SOLUTION 0.5 MG/.1ML
1 INJECTION, SOLUTION SUBCUTANEOUS ONCE
Refills: 0 | Status: DISCONTINUED | OUTPATIENT
Start: 2022-07-25 | End: 2022-07-26

## 2022-07-25 RX ORDER — INSULIN LISPRO 100/ML
VIAL (ML) SUBCUTANEOUS
Refills: 0 | Status: DISCONTINUED | OUTPATIENT
Start: 2022-07-25 | End: 2022-07-26

## 2022-07-25 RX ORDER — INSULIN GLARGINE 100 [IU]/ML
30 INJECTION, SOLUTION SUBCUTANEOUS AT BEDTIME
Refills: 0 | Status: DISCONTINUED | OUTPATIENT
Start: 2022-07-25 | End: 2022-07-26

## 2022-07-25 RX ORDER — SODIUM CHLORIDE 9 MG/ML
1000 INJECTION INTRAMUSCULAR; INTRAVENOUS; SUBCUTANEOUS
Refills: 0 | Status: DISCONTINUED | OUTPATIENT
Start: 2022-07-25 | End: 2022-07-25

## 2022-07-25 RX ORDER — INSULIN HUMAN 100 [IU]/ML
6 INJECTION, SOLUTION SUBCUTANEOUS
Qty: 100 | Refills: 0 | Status: DISCONTINUED | OUTPATIENT
Start: 2022-07-25 | End: 2022-07-25

## 2022-07-25 RX ORDER — SODIUM CHLORIDE 9 MG/ML
1000 INJECTION, SOLUTION INTRAVENOUS
Refills: 0 | Status: DISCONTINUED | OUTPATIENT
Start: 2022-07-25 | End: 2022-07-25

## 2022-07-25 RX ORDER — INSULIN LISPRO 100/ML
10 VIAL (ML) SUBCUTANEOUS
Refills: 0 | Status: DISCONTINUED | OUTPATIENT
Start: 2022-07-25 | End: 2022-07-26

## 2022-07-25 RX ORDER — DEXTROSE 50 % IN WATER 50 %
15 SYRINGE (ML) INTRAVENOUS ONCE
Refills: 0 | Status: DISCONTINUED | OUTPATIENT
Start: 2022-07-25 | End: 2022-07-26

## 2022-07-25 RX ORDER — INSULIN GLARGINE 100 [IU]/ML
30 INJECTION, SOLUTION SUBCUTANEOUS ONCE
Refills: 0 | Status: COMPLETED | OUTPATIENT
Start: 2022-07-25 | End: 2022-07-25

## 2022-07-25 RX ORDER — ENOXAPARIN SODIUM 100 MG/ML
40 INJECTION SUBCUTANEOUS EVERY 24 HOURS
Refills: 0 | Status: DISCONTINUED | OUTPATIENT
Start: 2022-07-25 | End: 2022-07-26

## 2022-07-25 RX ORDER — DEXTROSE 50 % IN WATER 50 %
12.5 SYRINGE (ML) INTRAVENOUS ONCE
Refills: 0 | Status: DISCONTINUED | OUTPATIENT
Start: 2022-07-25 | End: 2022-07-26

## 2022-07-25 RX ADMIN — Medication 1 TABLET(S): at 18:34

## 2022-07-25 RX ADMIN — Medication 10 UNIT(S): at 15:16

## 2022-07-25 RX ADMIN — INSULIN GLARGINE 30 UNIT(S): 100 INJECTION, SOLUTION SUBCUTANEOUS at 10:54

## 2022-07-25 RX ADMIN — SODIUM CHLORIDE 250 MILLILITER(S): 9 INJECTION, SOLUTION INTRAVENOUS at 00:28

## 2022-07-25 RX ADMIN — INSULIN HUMAN 7 UNIT(S)/HR: 100 INJECTION, SOLUTION SUBCUTANEOUS at 00:28

## 2022-07-25 RX ADMIN — SODIUM CHLORIDE 75 MILLILITER(S): 9 INJECTION, SOLUTION INTRAVENOUS at 10:55

## 2022-07-25 RX ADMIN — Medication 10 UNIT(S): at 12:41

## 2022-07-25 RX ADMIN — Medication 1 TABLET(S): at 02:38

## 2022-07-25 RX ADMIN — Medication 1 TABLET(S): at 13:01

## 2022-07-25 RX ADMIN — SODIUM CHLORIDE 1000 MILLILITER(S): 9 INJECTION INTRAMUSCULAR; INTRAVENOUS; SUBCUTANEOUS at 02:38

## 2022-07-25 RX ADMIN — SODIUM CHLORIDE 250 MILLILITER(S): 9 INJECTION, SOLUTION INTRAVENOUS at 05:40

## 2022-07-25 RX ADMIN — INSULIN HUMAN 6 UNIT(S)/HR: 100 INJECTION, SOLUTION SUBCUTANEOUS at 05:40

## 2022-07-25 RX ADMIN — Medication 10 UNIT(S): at 18:34

## 2022-07-25 NOTE — CONSULT NOTE ADULT - ASSESSMENT
38yo  LMP 22, admitted with DKA, with uncontrolled T2DM, with pregnancy of unknown location, currently clinically and hemodynamically stable.    -no acute GYN intervention  -recommend repeat bhcg in 48h ()  -ectopic precautions given  -recommend prenatal vitamin  -GYN to follow while in house    Dr. Grace to be made aware

## 2022-07-25 NOTE — ED ADULT NURSE REASSESSMENT NOTE - NS ED NURSE REASSESS COMMENT FT1
Pt A&O x 4, able to make needs known. Pt ambulatory without assistance, pt's partner at bedside. Pt requesting food, insulin drip discontinued as per order, will check FS Q1 for 2 hours. Attempting to contact x [phone] ICU resident for plan of care update and medicine sign out.

## 2022-07-25 NOTE — CONSULT NOTE ADULT - SUBJECTIVE AND OBJECTIVE BOX
PGY3 Note  Chief Complaint: positive upreg     HPI: 38yo  LMP: 22 presents to ED for confirmation of pregnancy. Patient suspected she was pregnant and presented to the ED for confirmation, while here she was subsequently found to be in DKA. She has known h/o T2DM, reports poor compliance with prescribed insulin regimen over the last month. Also reports intermittent spotting and abdominal cramping, currently asymptomatic.  She states her LMP was 22 but had a subsequent episode of bleeding on . Denies fever, chills, nausea, vomiting, abdominal pain, dysuria. Denies chest pain, SOB. Patient has not seen GYN in 3y. This is an unplanned but desired pregnancy. Patient is on insulin regimen of Metformin, Basalgar and Trulicity, has not taken meds in over a month. Has not seen endocrinologist in ~3y.    Ob/Gyn History:                   LMP - 22                  Cycle Length - monthly  H/o abnormal pap smears  Remote h/o gonorrhea and chlamydia, treated   Denies history of ovarian cysts, uterine fibroids, or other STIs  Last Pap Smear - unsure    OBhx:  FT  x3, 7-5    Denies the following: constitutional symptoms, visual symptoms, cardiovascular symptoms, respiratory symptoms, GI symptoms, musculoskeletal symptoms, skin symptoms, neurologic symptoms, hematologic symptoms, allergic symptoms, psychiatric symptoms  Except any pertinent positives listed.     PAST MEDICAL & SURGICAL HISTORY:  Diabetes mellitus  H/O eye surgery    PSHx:  History of D&C x2    Home Medications:  Trulicity 1x/week  Basiglar 26u at bedtime  Metformin 1000mg BID    Allergies: No Known Allergies    FAMILY HISTORY:  Family history of diabetes mellitus (Father, Mother)    SOCIAL HISTORY: Denies cigarette use, alcohol use, or illicit drug use    Vital Signs Last 24 Hrs  T(F): 97.8 (2022 16:01), Max: 97.8 (2022 16:01)  HR: 85 (2022 23:30) (85 - 91)  BP: 115/61 (2022 23:30) (115/61 - 124/69)  RR: 17 (2022 23:30) (17 - 18)  Height (cm): 156.2 (22 @ 16:01)  Weight (kg): 70.8 (22 @ 16:01)  BMI (kg/m2): 29 (22 @ 16:01)  BSA (m2): 1.71 (22 @ 16:01)    General Appearance - AAOx3, NAD  Heart - S1S2 regular rate and rhythm  Lung - CTA Bilaterally  Abdomen - Soft, nontender, nondistended, no rebound, no rigidity, no guarding, bowel sounds present    GYN/Pelvis:    Labia Majora - Normal  Labia Minora - Normal  Clitoris - Normal  Urethra - Normal  Vagina - Normal  Cervix - No bleeding, no CMT    Uterus:  Size - Normal  Tenderness - None  Mass - None  Freely mobile    Adnexa:  Masses - None  Tenderness - None    LABS:                        13.2   5.59  )-----------( 241      ( 2022 17:13 )             39.9     HCG Quantitative, Serum: 904.7 mIU/mL (22 @ 17:13)    ABO RH Interpretation: AB POS (22 @ 17:15)  Antibody Screen: NEG (22 @ 17:15)        133<L>  |  96<L>  |  15  ----------------------------<  513<HH>  4.0   |  17  |  0.9    Ca    9.2      2022 17:13    TPro  6.9  /  Alb  4.0  /  TBili  0.3  /  DBili  x   /  AST  11  /  ALT  15  /  AlkPhos  132<H>        Urinalysis Basic - ( 2022 17:22 )    Color: Light Yellow / Appearance: Clear / S.035 / pH: x  Gluc: x / Ketone: Large  / Bili: Negative / Urobili: <2 mg/dL   Blood: x / Protein: Negative / Nitrite: Negative   Leuk Esterase: Large / RBC: 1 /HPF / WBC 51 /HPF   Sq Epi: x / Non Sq Epi: 12 /HPF / Bacteria: Few    RADIOLOGY & ADDITIONAL STUDIES:  < from: US Transvaginal (22 @ 20:16) >    ACC: [id number] EXAM:  US TRANSVAGINAL                          PROCEDURE DATE:  2022          INTERPRETATION:  CLINICAL INFORMATION: Pelvic pain. Pregnancy. Vaginal   bleeding. Beta hCG 904.    LMP: 2022    COMPARISON: None available.    TECHNIQUE:  Endovaginal and transabdominal pelvic sonogram. Color and Spectral   Doppler was performed.    FINDINGS:  No evidence of intrauterine pregnancy.  Uterus measures 11.1 x 5.9 x 6.3 cm.  Thickened, heterogeneous endometrium, difficult to measure.    Right ovary not identified. Left ovary measures 3.3 x 2.2 x 2.3 cm. Left   paraovarian 1.5 cm cystic structure, which is difficult to differentiate   whether its separate or within periphery of the left ovary    No significant free pelvic fluid.    IMPRESSION:    No evidence of intrauterine pregnancy. Thickened, heterogeneous   endometrium.    Left paraovarian 1.5 cm cystic structure, which is difficult to   differentiate whether its separate or within periphery of the left ovary;   this may represent paraovarian cyst versus early ectopic pregnancy.   Gynecology consultation recommended. Short interval follow-up beta hCG   and pelvic ultrasound recommended.    Right ovary not identified.    Spoke with Dr. Hubbard    --- End of Report ---      STELLA LIMON MD; Attending Radiologist  This document has been electronically signed. 2022 10:28PM    < end of copied text >

## 2022-07-26 ENCOUNTER — TRANSCRIPTION ENCOUNTER (OUTPATIENT)
Age: 37
End: 2022-07-26

## 2022-07-26 VITALS
HEART RATE: 87 BPM | DIASTOLIC BLOOD PRESSURE: 57 MMHG | OXYGEN SATURATION: 100 % | RESPIRATION RATE: 18 BRPM | TEMPERATURE: 98 F | SYSTOLIC BLOOD PRESSURE: 101 MMHG

## 2022-07-26 DIAGNOSIS — Z79.4 TYPE 2 DIABETES MELLITUS WITH HYPERGLYCEMIA: ICD-10-CM

## 2022-07-26 DIAGNOSIS — E11.65 TYPE 2 DIABETES MELLITUS WITH HYPERGLYCEMIA: ICD-10-CM

## 2022-07-26 LAB
A1C WITH ESTIMATED AVERAGE GLUCOSE RESULT: >15.5 % — HIGH (ref 4–5.6)
ALBUMIN SERPL ELPH-MCNC: 2.9 G/DL — LOW (ref 3.5–5.2)
ALP SERPL-CCNC: 72 U/L — SIGNIFICANT CHANGE UP (ref 30–115)
ALT FLD-CCNC: 11 U/L — SIGNIFICANT CHANGE UP (ref 0–41)
ANION GAP SERPL CALC-SCNC: 12 MMOL/L — SIGNIFICANT CHANGE UP (ref 7–14)
AST SERPL-CCNC: 13 U/L — SIGNIFICANT CHANGE UP (ref 0–41)
BASOPHILS # BLD AUTO: 0.02 K/UL — SIGNIFICANT CHANGE UP (ref 0–0.2)
BASOPHILS NFR BLD AUTO: 0.3 % — SIGNIFICANT CHANGE UP (ref 0–1)
BILIRUB SERPL-MCNC: 0.4 MG/DL — SIGNIFICANT CHANGE UP (ref 0.2–1.2)
BLD GP AB SCN SERPL QL: SIGNIFICANT CHANGE UP
BUN SERPL-MCNC: 7 MG/DL — LOW (ref 10–20)
CALCIUM SERPL-MCNC: 8.4 MG/DL — LOW (ref 8.5–10.1)
CHLORIDE SERPL-SCNC: 105 MMOL/L — SIGNIFICANT CHANGE UP (ref 98–110)
CO2 SERPL-SCNC: 19 MMOL/L — SIGNIFICANT CHANGE UP (ref 17–32)
CREAT SERPL-MCNC: 0.5 MG/DL — LOW (ref 0.7–1.5)
CULTURE RESULTS: SIGNIFICANT CHANGE UP
EGFR: 122 ML/MIN/1.73M2 — SIGNIFICANT CHANGE UP
EOSINOPHIL # BLD AUTO: 0.05 K/UL — SIGNIFICANT CHANGE UP (ref 0–0.7)
EOSINOPHIL NFR BLD AUTO: 0.7 % — SIGNIFICANT CHANGE UP (ref 0–8)
ESTIMATED AVERAGE GLUCOSE: >398 MG/DL — HIGH (ref 68–114)
GLUCOSE BLDC GLUCOMTR-MCNC: 133 MG/DL — HIGH (ref 70–99)
GLUCOSE BLDC GLUCOMTR-MCNC: 137 MG/DL — HIGH (ref 70–99)
GLUCOSE BLDC GLUCOMTR-MCNC: 157 MG/DL — HIGH (ref 70–99)
GLUCOSE BLDC GLUCOMTR-MCNC: 200 MG/DL — HIGH (ref 70–99)
GLUCOSE BLDC GLUCOMTR-MCNC: 272 MG/DL — HIGH (ref 70–99)
GLUCOSE BLDC GLUCOMTR-MCNC: 78 MG/DL — SIGNIFICANT CHANGE UP (ref 70–99)
GLUCOSE SERPL-MCNC: 175 MG/DL — HIGH (ref 70–99)
HCG SERPL-ACNC: 1494 MIU/ML — HIGH
HCT VFR BLD CALC: 36.8 % — LOW (ref 37–47)
HGB BLD-MCNC: 12.3 G/DL — SIGNIFICANT CHANGE UP (ref 12–16)
IMM GRANULOCYTES NFR BLD AUTO: 0.6 % — HIGH (ref 0.1–0.3)
LYMPHOCYTES # BLD AUTO: 2.16 K/UL — SIGNIFICANT CHANGE UP (ref 1.2–3.4)
LYMPHOCYTES # BLD AUTO: 30.1 % — SIGNIFICANT CHANGE UP (ref 20.5–51.1)
MAGNESIUM SERPL-MCNC: 1.7 MG/DL — LOW (ref 1.8–2.4)
MCHC RBC-ENTMCNC: 25.7 PG — LOW (ref 27–31)
MCHC RBC-ENTMCNC: 33.4 G/DL — SIGNIFICANT CHANGE UP (ref 32–37)
MCV RBC AUTO: 77 FL — LOW (ref 81–99)
MONOCYTES # BLD AUTO: 0.49 K/UL — SIGNIFICANT CHANGE UP (ref 0.1–0.6)
MONOCYTES NFR BLD AUTO: 6.8 % — SIGNIFICANT CHANGE UP (ref 1.7–9.3)
NEUTROPHILS # BLD AUTO: 4.41 K/UL — SIGNIFICANT CHANGE UP (ref 1.4–6.5)
NEUTROPHILS NFR BLD AUTO: 61.5 % — SIGNIFICANT CHANGE UP (ref 42.2–75.2)
NRBC # BLD: 0 /100 WBCS — SIGNIFICANT CHANGE UP (ref 0–0)
PLATELET # BLD AUTO: 210 K/UL — SIGNIFICANT CHANGE UP (ref 130–400)
POTASSIUM SERPL-MCNC: 3.6 MMOL/L — SIGNIFICANT CHANGE UP (ref 3.5–5)
POTASSIUM SERPL-SCNC: 3.6 MMOL/L — SIGNIFICANT CHANGE UP (ref 3.5–5)
PROT SERPL-MCNC: 5.7 G/DL — LOW (ref 6–8)
RBC # BLD: 4.78 M/UL — SIGNIFICANT CHANGE UP (ref 4.2–5.4)
RBC # FLD: 12.3 % — SIGNIFICANT CHANGE UP (ref 11.5–14.5)
SODIUM SERPL-SCNC: 136 MMOL/L — SIGNIFICANT CHANGE UP (ref 135–146)
SPECIMEN SOURCE: SIGNIFICANT CHANGE UP
WBC # BLD: 7.17 K/UL — SIGNIFICANT CHANGE UP (ref 4.8–10.8)
WBC # FLD AUTO: 7.17 K/UL — SIGNIFICANT CHANGE UP (ref 4.8–10.8)

## 2022-07-26 PROCEDURE — 99231 SBSQ HOSP IP/OBS SF/LOW 25: CPT

## 2022-07-26 RX ORDER — HUMAN INSULIN 100 [IU]/ML
100 INJECTION, SUSPENSION SUBCUTANEOUS
Qty: 10 | Refills: 6 | Status: DISCONTINUED | COMMUNITY
Start: 2018-12-18 | End: 2022-07-26

## 2022-07-26 RX ORDER — METFORMIN HYDROCHLORIDE 850 MG/1
1 TABLET ORAL
Qty: 0 | Refills: 0 | DISCHARGE

## 2022-07-26 RX ORDER — INSULIN GLARGINE 100 [IU]/ML
40 INJECTION, SOLUTION SUBCUTANEOUS
Qty: 15 | Refills: 0
Start: 2022-07-26

## 2022-07-26 RX ORDER — INSULIN LISPRO 100/ML
10 VIAL (ML) SUBCUTANEOUS
Qty: 10 | Refills: 0
Start: 2022-07-26

## 2022-07-26 RX ORDER — ACETAMINOPHEN 500 MG
650 TABLET ORAL ONCE
Refills: 0 | Status: COMPLETED | OUTPATIENT
Start: 2022-07-26 | End: 2022-07-26

## 2022-07-26 RX ORDER — PRENATAL VIT NO.130/IRON/FOLIC 27MG-0.8MG
28-0.8 TABLET ORAL
Qty: 100 | Refills: 2 | Status: ACTIVE | COMMUNITY
Start: 2018-12-27 | End: 1900-01-01

## 2022-07-26 RX ORDER — INSULIN GLARGINE 100 [IU]/ML
40 INJECTION, SOLUTION SUBCUTANEOUS AT BEDTIME
Refills: 0 | Status: DISCONTINUED | OUTPATIENT
Start: 2022-07-26 | End: 2022-07-26

## 2022-07-26 RX ORDER — INSULIN LISPRO 100 U/ML
100 INJECTION, SOLUTION SUBCUTANEOUS EVERY 8 HOURS
Qty: 1 | Refills: 5 | Status: ACTIVE | COMMUNITY
Start: 2022-07-26 | End: 1900-01-01

## 2022-07-26 RX ORDER — METFORMIN HYDROCHLORIDE 1000 MG/1
1000 TABLET, COATED ORAL
Qty: 180 | Refills: 3 | Status: ACTIVE | COMMUNITY
Start: 2022-07-26 | End: 1900-01-01

## 2022-07-26 RX ORDER — INSULIN HUMAN 100 [IU]/ML
100 INJECTION, SUSPENSION SUBCUTANEOUS
Qty: 1 | Refills: 10 | Status: DISCONTINUED | COMMUNITY
Start: 2019-06-06 | End: 2022-07-26

## 2022-07-26 RX ORDER — ASPIRIN ENTERIC COATED TABLETS 81 MG 81 MG/1
81 TABLET, DELAYED RELEASE ORAL
Qty: 100 | Refills: 2 | Status: ACTIVE | COMMUNITY
Start: 2019-02-08 | End: 1900-01-01

## 2022-07-26 RX ORDER — INSULIN GLARGINE 100 [IU]/ML
100 INJECTION, SOLUTION SUBCUTANEOUS DAILY
Qty: 1 | Refills: 5 | Status: ACTIVE | COMMUNITY
Start: 2022-07-26 | End: 1900-01-01

## 2022-07-26 RX ORDER — METFORMIN HYDROCHLORIDE 850 MG/1
1 TABLET ORAL
Qty: 60 | Refills: 0
Start: 2022-07-26

## 2022-07-26 RX ORDER — ISOPROPYL ALCOHOL, BENZOCAINE .7; .06 ML/ML; ML/ML
1 SWAB TOPICAL
Qty: 100 | Refills: 1
Start: 2022-07-26 | End: 2022-09-13

## 2022-07-26 RX ORDER — ISOPROPYL ALCOHOL, BENZOCAINE .7; .06 ML/ML; ML/ML
1 SWAB TOPICAL
Qty: 25 | Refills: 1
Start: 2022-07-26 | End: 2022-09-13

## 2022-07-26 RX ORDER — INSULIN LISPRO 100 U/ML
100 INJECTION, SOLUTION INTRAVENOUS; SUBCUTANEOUS
Qty: 10 | Refills: 10 | Status: DISCONTINUED | COMMUNITY
Start: 2019-07-01 | End: 2022-07-26

## 2022-07-26 RX ORDER — INSULIN LISPRO 100 [IU]/ML
100 INJECTION, SOLUTION INTRAVENOUS; SUBCUTANEOUS
Qty: 1 | Refills: 10 | Status: DISCONTINUED | COMMUNITY
Start: 2019-06-06 | End: 2022-07-26

## 2022-07-26 RX ORDER — INSULIN LISPRO 100 [IU]/ML
100 INJECTION, SOLUTION INTRAVENOUS; SUBCUTANEOUS
Qty: 3 | Refills: 5 | Status: DISCONTINUED | COMMUNITY
Start: 2019-02-22 | End: 2022-07-26

## 2022-07-26 RX ORDER — INSULIN GLARGINE 100 [IU]/ML
26 INJECTION, SOLUTION SUBCUTANEOUS
Qty: 0 | Refills: 0 | DISCHARGE

## 2022-07-26 RX ADMIN — Medication 3: at 09:22

## 2022-07-26 RX ADMIN — Medication 1: at 12:07

## 2022-07-26 RX ADMIN — SODIUM CHLORIDE 75 MILLILITER(S): 9 INJECTION, SOLUTION INTRAVENOUS at 00:39

## 2022-07-26 RX ADMIN — INSULIN GLARGINE 30 UNIT(S): 100 INJECTION, SOLUTION SUBCUTANEOUS at 00:38

## 2022-07-26 RX ADMIN — Medication 10 UNIT(S): at 12:07

## 2022-07-26 RX ADMIN — Medication 1 TABLET(S): at 17:51

## 2022-07-26 RX ADMIN — Medication 1 TABLET(S): at 05:26

## 2022-07-26 RX ADMIN — ENOXAPARIN SODIUM 40 MILLIGRAM(S): 100 INJECTION SUBCUTANEOUS at 05:26

## 2022-07-26 RX ADMIN — Medication 10 UNIT(S): at 18:03

## 2022-07-26 RX ADMIN — Medication 650 MILLIGRAM(S): at 06:34

## 2022-07-26 RX ADMIN — Medication 1 TABLET(S): at 12:30

## 2022-07-26 RX ADMIN — Medication 650 MILLIGRAM(S): at 05:52

## 2022-07-26 RX ADMIN — Medication 10 UNIT(S): at 09:22

## 2022-07-26 NOTE — CONSULT NOTE ADULT - SUBJECTIVE AND OBJECTIVE BOX
38 y/o F w/ pmhx of T2DM & OB/GYN hx of  (with LMP 22) presents to ED after having what she believed were "pregnancy symptoms" (bloating & cramps for ~2wks) in addition to missing her period & vaginal spotting.     Patient admits to being non-compliant on her T2DM home regimen (Trulicity, Metformin & Basaglar). She did not seem to care about controlling her DM stating "I'll be fine" when warned about possibly fatal consequences of DKA.     ED:   - VS: 124/69, HR 91, 98F, 98% on RA  - Labs: Glu 513, BHB 3.7, Na 140 (corrected), AG 20, , pH 7.28, UA(+)    - Transvaginal US:   ·	No evidence of intrauterine pregnancy.   ·	Left paraovarian 1.5 cm cystic structure (paraovarian cyst versus early ectopic pregnancy).    S/P ICU admission       Age at Dx:  How dx:  Hx and duration of insulin:  Current Therapy:  Hx of hypoglycemia  Hx of DKA/HHS?    Home FSG:  Fasting  Lunch  Dinner  Bed    Hx of other regimens  Complications:  Outpatient Endo:    PMH & Surgical Hx:: DKA (DIABETIC KETOACIDOSIS); PREGNANCY      Family history of diabetes mellitus (Father, Mother)        Type 1 diabetes mellitus with hyperglycemia    DKA (diabetic ketoacidosis)    H/O eye surgery    History of D&amp;C    PREG TEST    34    Pregnancy        Current Meds:  amoxicillin  500 milliGRAM(s)/clavulanate 1 Tablet(s) Oral two times a day  dextrose 5%. 1000 milliLiter(s) IV Continuous <Continuous>  dextrose 5%. 1000 milliLiter(s) IV Continuous <Continuous>  dextrose 50% Injectable 25 Gram(s) IV Push once  dextrose 50% Injectable 12.5 Gram(s) IV Push once  dextrose 50% Injectable 25 Gram(s) IV Push once  dextrose Oral Gel 15 Gram(s) Oral once PRN  enoxaparin Injectable 40 milliGRAM(s) SubCutaneous every 24 hours  glucagon  Injectable 1 milliGRAM(s) IntraMuscular once  insulin glargine Injectable (LANTUS) 30 Unit(s) SubCutaneous at bedtime  insulin lispro (ADMELOG) corrective regimen sliding scale   SubCutaneous three times a day before meals  insulin lispro Injectable (ADMELOG) 10 Unit(s) SubCutaneous three times a day before meals  lactated ringers. 1000 milliLiter(s) IV Continuous <Continuous>  ondansetron Injectable 4 milliGRAM(s) IV Push four times a day PRN  prenatal multivitamin 1 Tablet(s) Oral daily      Allergies:  No Known Allergies      ROS:  Denies the following except as indicated.    General: weight loss/weight gain, decreased appetite, fatigue  Eyes: Blurry vision, double vision, visual changes  ENT: Throat pain, changes in voice,   CV: palpitations, SOB, CP, cough  GI: NVD, difficulty swallowing, abdominal pain  : polyuria, dysuria  Endo:  temperature intolerance, decreased libido  MSK: weakness, joint pain  Skin: rash, dryness, diaphoresis  Heme: Easy bruising,bleeding  Neuro: HA, dizziness, lightheadedness, numbness tingling  Psych: Anxiety, Depression    Vital Signs Last 24 Hrs  T(C): 36.4 (2022 12:14), Max: 36.9 (2022 00:39)  T(F): 97.5 (2022 12:14), Max: 98.5 (2022 04:44)  HR: 108 (2022 12:14) (78 - 108)  BP: 106/55 (2022 12:14) (102/51 - 113/73)  BP(mean): --  RR: 18 (2022 12:14) (18 - 20)  SpO2: 100% (2022 12:14) (98% - 100%)    Parameters below as of 2022 16:32  Patient On (Oxygen Delivery Method): room air      Height (cm): 154.9 ( @ 00:39)  Weight (kg): 72.3 ( @ 00:39)  BMI (kg/m2): 30.1 ( @ 00:39)      Constitutional: wn/wd in NAD.   HEENT: NCAT, MMM, OP clear, EOMI, , no proptosis or lid retraction  Neck: no thyromegaly or palpable thyroid nodules   Respiratory: lungs CTAB.  Cardiovascular: regular rhythm, normal S1 and S2, no audible murmurs, no peripheral edema  GI: soft, NT/ND, no masses/HSM appreciated.  Neurology: no tremors, DTR 2+  Skin: no visible rashes/lesions  Psychiatric: AAO x 3, normal affect/mood.  Ext: radial pulses intact, DP pulses intact, extremities warm, no cyanosis, clubbing or edema.       LABS:                        12.3   7.17  )-----------( 210      ( 2022 06:24 )             36.8         136  |  105  |  7<L>  ----------------------------<  175<H>  3.6   |  19  |  0.5<L>    Ca    8.4<L>      2022 06:24  Mg     1.7         TPro  5.7<L>  /  Alb  2.9<L>  /  TBili  0.4  /  DBili  x   /  AST  13  /  ALT  11  /  AlkPhos  72        Urinalysis Basic - ( 2022 17:22 )    Color: Light Yellow / Appearance: Clear / S.035 / pH: x  Gluc: x / Ketone: Large  / Bili: Negative / Urobili: <2 mg/dL   Blood: x / Protein: Negative / Nitrite: Negative   Leuk Esterase: Large / RBC: 1 /HPF / WBC 51 /HPF   Sq Epi: x / Non Sq Epi: 12 /HPF / Bacteria: Few            RADIOLOGY & ADDITIONAL STUDIES:  CAPILLARY BLOOD GLUCOSE      POCT Blood Glucose.: 200 mg/dL (2022 11:33)  POCT Blood Glucose.: 272 mg/dL (2022 09:05)  POCT Blood Glucose.: 137 mg/dL (2022 00:08)  POCT Blood Glucose.: 150 mg/dL (2022 21:29)  POCT Blood Glucose.: 81 mg/dL (2022 17:37)  POCT Blood Glucose.: 222 mg/dL (2022 15:13)  POCT Blood Glucose.: 317 mg/dL (2022 13:14)        A/P:37y Female    Will discuss case with attending and update primary team 36 y/o F w/ pmhx of T2DM & OB/GYN hx of  (with LMP 22) presents to ED after having what she believed were "pregnancy symptoms" (bloating & cramps for ~2wks) in addition to missing her period & vaginal spotting.     Patient admits to being non-compliant on her T2DM home regimen (Trulicity, Metformin & Basaglar). She did not seem to care about controlling her DM stating "I'll be fine" when warned about possibly fatal consequences of DKA.     ED:   - VS: 124/69, HR 91, 98F, 98% on RA  - Labs: Glu 513, BHB 3.7, Na 140 (corrected), AG 20, , pH 7.28, UA(+)    - Transvaginal US:   ·	No evidence of intrauterine pregnancy.   ·	Left paraovarian 1.5 cm cystic structure (paraovarian cyst versus early ectopic pregnancy).    S/P ICU admission       Dx 18 yrs ago  Hx and duration of insulin:  Current Therapy: Basaglar, Trulicity and Metformin prior to admission  but not compliant   Hx of hypoglycemia  Hx of DKA/HHS?      H    PMH & Surgical Hx:: DKA (DIABETIC KETOACIDOSIS); PREGNANCY      Family history of diabetes mellitus (Father, Mother)        Type 1 diabetes mellitus with hyperglycemia    DKA (diabetic ketoacidosis)    H/O eye surgery    History of D&amp;C    PREG TEST    34    Pregnancy        Current Meds:  amoxicillin  500 milliGRAM(s)/clavulanate 1 Tablet(s) Oral two times a day  dextrose 5%. 1000 milliLiter(s) IV Continuous <Continuous>  dextrose 5%. 1000 milliLiter(s) IV Continuous <Continuous>  dextrose 50% Injectable 25 Gram(s) IV Push once  dextrose 50% Injectable 12.5 Gram(s) IV Push once  dextrose 50% Injectable 25 Gram(s) IV Push once  dextrose Oral Gel 15 Gram(s) Oral once PRN  enoxaparin Injectable 40 milliGRAM(s) SubCutaneous every 24 hours  glucagon  Injectable 1 milliGRAM(s) IntraMuscular once  insulin glargine Injectable (LANTUS) 30 Unit(s) SubCutaneous at bedtime  insulin lispro (ADMELOG) corrective regimen sliding scale   SubCutaneous three times a day before meals  insulin lispro Injectable (ADMELOG) 10 Unit(s) SubCutaneous three times a day before meals  lactated ringers. 1000 milliLiter(s) IV Continuous <Continuous>  ondansetron Injectable 4 milliGRAM(s) IV Push four times a day PRN  prenatal multivitamin 1 Tablet(s) Oral daily      Allergies:  No Known Allergies      ROS:  Denies the following except as indicated.    General: weight loss/weight gain, decreased appetite, fatigue  Eyes: Blurry vision, double vision, visual changes  ENT: Throat pain, changes in voice,   CV: palpitations, SOB, CP, cough  GI: NVD, difficulty swallowing, abdominal pain  : polyuria, dysuria  Endo:  temperature intolerance, decreased libido  MSK: weakness, joint pain  Skin: rash, dryness, diaphoresis  Heme: Easy bruising,bleeding  Neuro: HA, dizziness, lightheadedness, numbness tingling  Psych: Anxiety, Depression    Vital Signs Last 24 Hrs  T(C): 36.4 (2022 12:14), Max: 36.9 (2022 00:39)  T(F): 97.5 (2022 12:14), Max: 98.5 (2022 04:44)  HR: 108 (2022 12:14) (78 - 108)  BP: 106/55 (2022 12:14) (102/51 - 113/73)  BP(mean): --  RR: 18 (2022 12:14) (18 - 20)  SpO2: 100% (2022 12:14) (98% - 100%)    Parameters below as of 2022 16:32  Patient On (Oxygen Delivery Method): room air      Height (cm): 154.9 ( @ 00:39)  Weight (kg): 72.3 ( @ 00:39)  BMI (kg/m2): 30.1 ( @ 00:39)      Constitutional: wn/wd in NAD.   HEENT: NCAT, MMM, OP clear, EOMI, , no proptosis or lid retraction  Neck: no thyromegaly or palpable thyroid nodules   Respiratory: lungs CTAB.  Cardiovascular: regular rhythm, normal S1 and S2, no audible murmurs, no peripheral edema  GI: soft, NT/ND, no masses/HSM appreciated.  Neurology: no tremors, DTR 2+  Skin: no visible rashes/lesions  Psychiatric: AAO x 3, normal affect/mood.  Ext: radial pulses intact, DP pulses intact, extremities warm, no cyanosis, clubbing or edema.       LABS:                        12.3   7.17  )-----------( 210      ( 2022 06:24 )             36.8         136  |  105  |  7<L>  ----------------------------<  175<H>  3.6   |  19  |  0.5<L>    Ca    8.4<L>      2022 06:24  Mg     1.7         TPro  5.7<L>  /  Alb  2.9<L>  /  TBili  0.4  /  DBili  x   /  AST  13  /  ALT  11  /  AlkPhos  72        Urinalysis Basic - ( 2022 17:22 )    Color: Light Yellow / Appearance: Clear / S.035 / pH: x  Gluc: x / Ketone: Large  / Bili: Negative / Urobili: <2 mg/dL   Blood: x / Protein: Negative / Nitrite: Negative   Leuk Esterase: Large / RBC: 1 /HPF / WBC 51 /HPF   Sq Epi: x / Non Sq Epi: 12 /HPF / Bacteria: Few            RADIOLOGY & ADDITIONAL STUDIES:  CAPILLARY BLOOD GLUCOSE      POCT Blood Glucose.: 200 mg/dL (2022 11:33)  POCT Blood Glucose.: 272 mg/dL (2022 09:05)  POCT Blood Glucose.: 137 mg/dL (2022 00:08)  POCT Blood Glucose.: 150 mg/dL (2022 21:29)  POCT Blood Glucose.: 81 mg/dL (2022 17:37)  POCT Blood Glucose.: 222 mg/dL (2022 15:13)  POCT Blood Glucose.: 317 mg/dL (2022 13:14)        A/P:37y Female    #DKA Resolved  #Uncontrolled T2DM  Cont metformin 1000 BID  Basaglar 40u qD  Admelog 10u TID   STOP TRULICITY

## 2022-07-26 NOTE — DISCHARGE NOTE PROVIDER - CARE PROVIDER_API CALL
Ryan Demarco)  [address] [place], [place] [address]  [address] [place] [address], [place] [address]  Phone: [phone]  Fax: [phone]  Follow Up Time:     REX JEAN BAPTISTE  Obstetrics and Gynecology  Phone: [phone]  Fax: [phone]  Follow Up Time:

## 2022-07-26 NOTE — PROGRESS NOTE ADULT - ASSESSMENT
A/P    DKA  DM  Pregnancy   Non compliance to meds    Plan    DKA improved  cont Insulin  f/u endo recs for diabetic management  ObGyn f/u

## 2022-07-26 NOTE — CONSULT NOTE ADULT - ATTENDING COMMENTS
pul  will repeat bhcg
type 2 diabetes, and pregnancy, ran out of all meds, pharmacy closed down, now uses [facility]. try freestyle freedom lite meter, test strips, and lancets metformin 1g. twice daily, basaglar kwik pen 40 units or so once a    day preferably in AM, admelog solostar before meals insulin dosage needs to be tirated upward depending on glucose measurements. needs outpatient follow up with endocrine/or high risk obstetrics as well as diabetes education.

## 2022-07-26 NOTE — DISCHARGE NOTE PROVIDER - NSDCCPCAREPLAN_GEN_ALL_CORE_FT
PRINCIPAL DISCHARGE DIAGNOSIS  Diagnosis: Pregnancy  Assessment and Plan of Treatment: You came to the hospital with symptoms of pregnancy and a positive blood test. Your blood test was positive for pregnancy. Please follow up with your OB GYN for further workup      SECONDARY DISCHARGE DIAGNOSES  Diagnosis: Pregnancy  Assessment and Plan of Treatment:

## 2022-07-26 NOTE — DISCHARGE NOTE PROVIDER - NSDCMRMEDTOKEN_GEN_ALL_CORE_FT
Octavio Ramachandran 100 units/mL subcutaneous solution: 26 unit(s) subcutaneous once a day (at bedtime)  metFORMIN 1000 mg oral tablet: 1 tab(s) orally 2 times a day  Prenatal Multivitamins with Folic Acid 1 mg oral tablet: 1 tab(s) orally once a day   alcohol swabs : Apply topically to affected area once a day -for muscle spasm - for wound care   Basaglar KwikPen 100 units/mL subcutaneous solution: 26 unit(s) subcutaneous once a day (at bedtime)  glucometer (per patient&#x27;s insurance): Test blood sugars four times a day. Dispense #1 glucometer.  lancets: 1 application subcutaneously 4 times a day   metFORMIN 1000 mg oral tablet: 1 tab(s) orally 2 times a day  Prenatal Multivitamins with Folic Acid 1 mg oral tablet: 1 tab(s) orally once a day  test strips (per patient&#x27;s insurance): 1 application subcutaneously 4 times a day. ** Compatible with patient&#x27;s glucometer **   Prenatal Multivitamins with Folic Acid 1 mg oral tablet: 1 tab(s) orally once a day   Admelog SoloStar 100 units/mL injectable solution: 10 unit(s) injectable 3 times a day MDD:30 units   alcohol swabs : Apply topically to affected area once a day -for muscle spasm - for wound care   Basaglar KwikPen 100 units/mL subcutaneous solution: 40 unit(s) subcutaneous once a day (at bedtime)   glucometer (per patient&#x27;s insurance): Test blood sugars four times a day. Dispense #1 glucometer.  lancets: 1 application subcutaneously 4 times a day   metFORMIN 1000 mg oral tablet: 1 tab(s) orally 2 times a day  Prenatal Multivitamins with Folic Acid 1 mg oral tablet: 1 tab(s) orally once a day  test strips (per patient&#x27;s insurance): 1 application subcutaneously 4 times a day. ** Compatible with patient&#x27;s glucometer **

## 2022-07-26 NOTE — DISCHARGE NOTE NURSING/CASE MANAGEMENT/SOCIAL WORK - NSDCPEFALRISK_GEN_ALL_CORE
For information on Fall & Injury Prevention, visit: [email or web address] OR  [email or web address] OR  https://www.cdc.gov/steadi/patient.html

## 2022-07-26 NOTE — DISCHARGE NOTE NURSING/CASE MANAGEMENT/SOCIAL WORK - PATIENT PORTAL LINK FT
You can access the FollowMyHealth Patient Portal offered by [facility] by registering at the following website: [email or web address]. By joining [facility]’s FollowMyHealth portal, you will also be able to view your health information using other applications (apps) compatible with our system.

## 2022-07-26 NOTE — DISCHARGE NOTE PROVIDER - HOSPITAL COURSE
38 y/o F w/ pmhx of T2DM & OB/GYN hx of  (with LMP 22) presents to ED after having what she believed were "pregnancy symptoms" (bloating & cramps for ~2wks) in addition to missing her period & vaginal spotting. Patient admits to being non-compliant on her T2DM home regimen (Trulicity, Metformin & Basaglar).     OB/GYN consulted for evaluation of vaginal spotting in the setting of elevated HCG.    ED:   - VS: 124/69, HR 91, 98F, 98% on RA  - Labs: Glu 513, BHB 3.7, Na 140 (corrected), AG 20, , pH 7.28, UA(+)    - Transvaginal US:   No evidence of intrauterine pregnancy.   Left paraovarian 1.5 cm cystic structure (paraovarian cyst versus early ectopic pregnancy).    During hospital stay Pt was evaluated for possible DKA, her ANion gap was normal and Glucose level is back to abseline. Pt was evaluated by GYN during admission and repeat HCG doubled coinciding with Normal pregnancy. Pt is medically stable and ready for dc and will f/u as outpt for her pre britni checkup. will need a repeat HCG on  for f/u     36 y/o F w/ pmhx of T2DM & OB/GYN hx of  (with LMP 22) presents to ED after having what she believed were "pregnancy symptoms" (bloating & cramps for ~2wks) in addition to missing her period & vaginal spotting. Patient admits to being non-compliant on her T2DM home regimen (Trulicity, Metformin & Basaglar).     OB/GYN consulted for evaluation of vaginal spotting in the setting of elevated HCG.    ED:   - VS: 124/69, HR 91, 98F, 98% on RA  - Labs: Glu 513, BHB 3.7, Na 140 (corrected), AG 20, , pH 7.28, UA(+)    - Transvaginal US:   No evidence of intrauterine pregnancy.   Left paraovarian 1.5 cm cystic structure (paraovarian cyst versus early ectopic pregnancy).    During hospital stay Pt was evaluated for possible DKA, her ANion gap was normal and Glucose level is back to abseline. Pt was evaluated by GYN during admission and repeat HCG doubled coinciding with Normal pregnancy. Pt is medically stable and ready for dc and will f/u as outpt for her pre britni checkup. will need a repeat HCG on  for f/u  seen by endo  started on bisaglar 40 units and admelog 10 units TID

## 2022-07-26 NOTE — PROGRESS NOTE ADULT - SUBJECTIVE AND OBJECTIVE BOX
INTERVAL HPI/OVERNIGHT EVENTS:    Admitted with Abd pain  found to have DKA  feels better today    REVIEW OF SYSTEMS:  CONSTITUTIONAL: No fever, weight loss, or fatigue  EYES: No eye pain, visual disturbances, or discharge  ENMT:  No difficulty hearing, tinnitus, vertigo; No sinus or throat pain  NECK: No pain or stiffness  BREASTS: No pain, masses, or nipple discharge  RESPIRATORY: No cough, wheezing, chills or hemoptysis; No shortness of breath  CARDIOVASCULAR: No chest pain, palpitations, dizziness, or leg swelling  GASTROINTESTINAL: No abdominal or epigastric pain. No nausea, vomiting, or hematemesis; No diarrhea or constipation. No melena or hematochezia.  GENITOURINARY: No dysuria, frequency, hematuria, or incontinence  NEUROLOGICAL: No headaches, memory loss, loss of strength, numbness, or tremors  SKIN: No itching, burning, rashes, or lesions   LYMPH NODES: No enlarged glands  ENDOCRINE: No heat or cold intolerance; No hair loss  MUSCULOSKELETAL: No joint pain or swelling; No muscle, back, or extremity pain  PSYCHIATRIC: No depression, anxiety, mood swings, or difficulty sleeping  HEME/LYMPH: No easy bruising, or bleeding gums  ALLERY AND IMMUNOLOGIC: No hives or eczema    VITALS:  T(F): 97.5, Max: 98.5 (22 @ 04:44)  HR: 108  BP: 106/55  RR: 18  SpO2: 100%    PHYSICAL EXAM:  GENERAL: NAD  HEAD:  Atraumatic, Normocephalic  EYES: EOMI, PERRLA, conjunctiva and sclera clear  ENMT: No tonsillar erythema, exudates, or enlargement; Moist mucous membranes, Good dentition, No lesions  NECK: Supple, No JVD, Normal thyroid  NERVOUS SYSTEM:  Alert & Oriented X3,   CHEST/LUNG: Clear to percussion bilaterally; No rales, rhonchi, wheezing, or rubs  HEART: Regular rate and rhythm; No murmurs, rubs, or gallops  ABDOMEN: Soft, Nontender, Nondistended; Bowel sounds present  EXTREMITIES:  2+ Peripheral Pulses, No clubbing, cyanosis, or edema  LYMPH: No lymphadenopathy noted  SKIN: No rashes or lesions      LABS:  Urinalysis Basic - ( 2022 17:22 )    Color: Light Yellow / Appearance: Clear / S.035 / pH: x  Gluc: x / Ketone: Large  / Bili: Negative / Urobili: <2 mg/dL   Blood: x / Protein: Negative / Nitrite: Negative   Leuk Esterase: Large / RBC: 1 /HPF / WBC 51 /HPF   Sq Epi: x / Non Sq Epi: 12 /HPF / Bacteria: Few          136  |  105  |  7<L>  ----------------------------<  175<H>  3.6   |  19  |  0.5<L>    Ca    8.4<L>      2022 06:24  Mg     1.7         TPro  5.7<L>  /  Alb  2.9<L>  /  TBili  0.4  /  DBili  x   /  AST  13  /  ALT  11  /  AlkPhos  72                            12.3   7.17  )-----------( 210      ( 2022 06:24 )             36.8       Culture - Urine (collected 2022 17:22)  Source: Clean Catch Clean Catch (Midstream)  Preliminary Report (2022 11:54):    50,000 - 99,000 CFU/mL Gram Positive Cocci in Pairs and Chains    <10,000 CFU/ml Normal Urogenital obey present          RADIOLOGY & ADDITIONAL TESTS:    Imaging Personally Reviewed:  [ ] YES  [ ] NO    MEDICATIONS:     MEDICATIONS  (STANDING):  amoxicillin  500 milliGRAM(s)/clavulanate 1 Tablet(s) Oral two times a day  dextrose 5%. 1000 milliLiter(s) (100 mL/Hr) IV Continuous <Continuous>  dextrose 5%. 1000 milliLiter(s) (50 mL/Hr) IV Continuous <Continuous>  dextrose 50% Injectable 25 Gram(s) IV Push once  dextrose 50% Injectable 12.5 Gram(s) IV Push once  dextrose 50% Injectable 25 Gram(s) IV Push once  enoxaparin Injectable 40 milliGRAM(s) SubCutaneous every 24 hours  glucagon  Injectable 1 milliGRAM(s) IntraMuscular once  insulin glargine Injectable (LANTUS) 30 Unit(s) SubCutaneous at bedtime  insulin lispro (ADMELOG) corrective regimen sliding scale   SubCutaneous three times a day before meals  insulin lispro Injectable (ADMELOG) 10 Unit(s) SubCutaneous three times a day before meals  lactated ringers. 1000 milliLiter(s) (75 mL/Hr) IV Continuous <Continuous>  prenatal multivitamin 1 Tablet(s) Oral daily    MEDICATIONS  (PRN):  dextrose Oral Gel 15 Gram(s) Oral once PRN Blood Glucose LESS THAN 70 milliGRAM(s)/deciliter  ondansetron Injectable 4 milliGRAM(s) IV Push four times a day PRN Nausea and/or Vomiting

## 2022-07-28 ENCOUNTER — NON-APPOINTMENT (OUTPATIENT)
Age: 37
End: 2022-07-28

## 2022-07-29 DIAGNOSIS — Z20.822 CONTACT WITH AND (SUSPECTED) EXPOSURE TO COVID-19: ICD-10-CM

## 2022-07-29 DIAGNOSIS — E11.10 TYPE 2 DIABETES MELLITUS WITH KETOACIDOSIS WITHOUT COMA: ICD-10-CM

## 2022-07-29 DIAGNOSIS — Z79.84 LONG TERM (CURRENT) USE OF ORAL HYPOGLYCEMIC DRUGS: ICD-10-CM

## 2022-07-29 DIAGNOSIS — Z79.4 LONG TERM (CURRENT) USE OF INSULIN: ICD-10-CM

## 2022-07-29 DIAGNOSIS — O24.111 PRE-EXISTING TYPE 2 DIABETES MELLITUS, IN PREGNANCY, FIRST TRIMESTER: ICD-10-CM

## 2022-07-29 DIAGNOSIS — Z3A.01 LESS THAN 8 WEEKS GESTATION OF PREGNANCY: ICD-10-CM

## 2022-07-29 DIAGNOSIS — Z91.14 PATIENT'S OTHER NONCOMPLIANCE WITH MEDICATION REGIMEN: ICD-10-CM

## 2022-07-29 DIAGNOSIS — O99.211 OBESITY COMPLICATING PREGNANCY, FIRST TRIMESTER: ICD-10-CM

## 2022-07-29 DIAGNOSIS — O99.281 ENDOCRINE, NUTRITIONAL AND METABOLIC DISEASES COMPLICATING PREGNANCY, FIRST TRIMESTER: ICD-10-CM

## 2022-08-02 LAB — HCG SERPL-MCNC: >8000 MIU/ML

## 2022-08-04 ENCOUNTER — OUTPATIENT (OUTPATIENT)
Dept: OUTPATIENT SERVICES | Facility: HOSPITAL | Age: 37
LOS: 1 days | Discharge: HOME | End: 2022-08-04

## 2022-08-04 ENCOUNTER — APPOINTMENT (OUTPATIENT)
Dept: OBGYN | Facility: CLINIC | Age: 37
End: 2022-08-04

## 2022-08-04 ENCOUNTER — NON-APPOINTMENT (OUTPATIENT)
Age: 37
End: 2022-08-04

## 2022-08-04 VITALS
BODY MASS INDEX: 29.29 KG/M2 | SYSTOLIC BLOOD PRESSURE: 98 MMHG | OXYGEN SATURATION: 99 % | HEART RATE: 79 BPM | WEIGHT: 160.13 LBS | DIASTOLIC BLOOD PRESSURE: 52 MMHG

## 2022-08-04 DIAGNOSIS — Z98.890 OTHER SPECIFIED POSTPROCEDURAL STATES: Chronic | ICD-10-CM

## 2022-08-04 PROCEDURE — 76857 US EXAM PELVIC LIMITED: CPT | Mod: 26

## 2022-08-04 PROCEDURE — 99212 OFFICE O/P EST SF 10 MIN: CPT

## 2022-08-04 NOTE — CHART NOTE - NSCHARTNOTEFT_GEN_A_CORE
PGY2 NOTE    Ms. Arora is a 38yo F who presented to [facility] with DKA and found to have positive pregnancy test.     HPI: 38yo  LMP 22 presented to ED on  for confirmation of pregnancy. H/o poorly controlled & poorly complaint T2DM on metformin, basalgar, and trulicity. Unplanned but desired pregnancy. S/p ICU admission for DKA on -. DDx: pregnancy of unknown location r/o ectopic.     Labs:   : 5.59>13.2/39.9<241, 133/4.0/96/17/15/0.9<513, AST/ALT: 11/15, bhc.7, AB pos, ab screen: neg, Covid neg; A1C >15.5%  : bhcg 1494 (65% increase)  : hcg >8000    Sonograms:    TVUS FINDINGS: No evidence of intrauterine pregnancy. Uterus measures 11.1 x 5.9 x 6.3 cm. Thickened, heterogeneous endometrium, difficult to measure. Right ovary not identified. Left ovary measures 3.3 x 2.2 x 2.3 cm. Left paraovarian 1.5 cm cystic structure, which is difficult to differentiate whether its separate or within periphery of the left ovary No significant free pelvic fluid.   8/3 outpatient: visible yolk sac and IUP    Ms. Arora was followed on the GYN team's beta-hcg list for pregnancy of unknown location. Levels appropriately moo for normal IUP, confirmed by ultrasound. Due to high risk pregnancy, patient will follow up for official sonogram and MFM visit on . GYN team will no longer follow. Pt and PMD aware.     Dr Fuentes and Dr Guevara aware.
PGY2 NOTE    Called patient to inform of beta-hcg level, increased to >8000, likely a normal IUP. Patient reports she is doing well. She measures her fingersticks (T2DM), not well controlled, usually in high 100s.     Patient will follow up this Thursday 9AM at the Clinic for an ultrasound to confirm pregnancy. Will then follow up for scheduled MFM appointment and sonogram on 8/8.     Dr Guevara and Dr Leal aware.
PGY2 Note    Result of beta-hcg returned 1494 (65% increase), appropriate for normal intrauterine pregnancy. Will repeat again on 7/28.   Patient followed on GYN team's beta-list.   Per medicine team, patient cleared for discharge.    Dr Heml aware, Dr Guevara to be aware

## 2022-08-08 ENCOUNTER — OUTPATIENT (OUTPATIENT)
Dept: OUTPATIENT SERVICES | Facility: HOSPITAL | Age: 37
LOS: 1 days | Discharge: HOME | End: 2022-08-08

## 2022-08-08 ENCOUNTER — APPOINTMENT (OUTPATIENT)
Dept: NUTRITION | Facility: CLINIC | Age: 37
End: 2022-08-08

## 2022-08-08 ENCOUNTER — APPOINTMENT (OUTPATIENT)
Dept: OBGYN | Facility: CLINIC | Age: 37
End: 2022-08-08

## 2022-08-08 ENCOUNTER — ASOB RESULT (OUTPATIENT)
Age: 37
End: 2022-08-08

## 2022-08-08 ENCOUNTER — RESULT CHARGE (OUTPATIENT)
Age: 37
End: 2022-08-08

## 2022-08-08 ENCOUNTER — APPOINTMENT (OUTPATIENT)
Dept: ANTEPARTUM | Facility: CLINIC | Age: 37
End: 2022-08-08

## 2022-08-08 VITALS
HEIGHT: 62 IN | HEART RATE: 85 BPM | OXYGEN SATURATION: 100 % | SYSTOLIC BLOOD PRESSURE: 114 MMHG | DIASTOLIC BLOOD PRESSURE: 67 MMHG | TEMPERATURE: 98.2 F | WEIGHT: 164 LBS | BODY MASS INDEX: 30.18 KG/M2

## 2022-08-08 DIAGNOSIS — Z98.890 OTHER SPECIFIED POSTPROCEDURAL STATES: Chronic | ICD-10-CM

## 2022-08-08 LAB
BILIRUB UR QL STRIP: NORMAL
BP DIAS: 67 MM HG
BP SYS: 114 MM HG
CLARITY UR: CLEAR
COLLECTION METHOD: NORMAL
FETAL HEART RATE (BPM): 118
GLUCOSE BLDC GLUCOMTR-MCNC: 276
GLUCOSE BLDC GLUCOMTR-MCNC: 276 MG/DL — HIGH (ref 70–99)
GLUCOSE UR-MCNC: 250
HCG UR QL: 0.2 EU/DL
HGB UR QL STRIP.AUTO: NORMAL
KETONES UR-MCNC: NORMAL
LEUKOCYTE ESTERASE UR QL STRIP: NORMAL
NITRITE UR QL STRIP: NORMAL
OB COMMENTS: NORMAL
PH UR STRIP: 5
PROT UR STRIP-MCNC: NORMAL
SCHEDULED VISIT: YES
SP GR UR STRIP: 1.02
URINE ALBUMIN/PROTEIN: NORMAL
URINE GLUCOSE: 250
URINE KETONES: NORMAL
WEEKS GESTATION: 6.1

## 2022-08-08 PROCEDURE — 76817 TRANSVAGINAL US OBSTETRIC: CPT | Mod: 26

## 2022-08-08 PROCEDURE — 99212 OFFICE O/P EST SF 10 MIN: CPT

## 2022-08-08 PROCEDURE — 76801 OB US < 14 WKS SINGLE FETUS: CPT | Mod: 26

## 2022-08-08 PROCEDURE — 99204 OFFICE O/P NEW MOD 45 MIN: CPT | Mod: 25

## 2022-08-08 RX ORDER — DEXTROSE 4 G
4-6 TABLET,CHEWABLE ORAL
Qty: 60 | Refills: 1 | Status: ACTIVE | COMMUNITY
Start: 2022-08-08 | End: 1900-01-01

## 2022-08-08 RX ORDER — GLUCAGON 1 MG
1 KIT INJECTION
Qty: 1 | Refills: 3 | Status: ACTIVE | COMMUNITY
Start: 2022-08-08 | End: 1900-01-01

## 2022-08-08 RX ORDER — ISOPROPYL ALCOHOL 70 ML/100ML
SWAB TOPICAL
Qty: 100 | Refills: 3 | Status: ACTIVE | COMMUNITY
Start: 2022-08-08 | End: 1900-01-01

## 2022-08-08 RX ORDER — BLOOD-GLUCOSE SENSOR
EACH MISCELLANEOUS
Qty: 9 | Refills: 3 | Status: ACTIVE | COMMUNITY
Start: 2022-08-08 | End: 1900-01-01

## 2022-08-08 RX ORDER — INSULIN ASPART 100 [IU]/ML
(70-30) 100 INJECTION, SUSPENSION SUBCUTANEOUS
Qty: 3 | Refills: 5 | Status: ACTIVE | COMMUNITY
Start: 2022-08-08 | End: 1900-01-01

## 2022-08-08 RX ORDER — PEN NEEDLE, DIABETIC 33 GX5/32"
33G X 4 MM NEEDLE, DISPOSABLE MISCELLANEOUS
Qty: 5 | Refills: 10 | Status: ACTIVE | COMMUNITY
Start: 2022-08-08 | End: 1900-01-01

## 2022-08-08 RX ORDER — INSULIN HUMAN 100 [IU]/ML
100 INJECTION, SUSPENSION SUBCUTANEOUS
Qty: 5 | Refills: 5 | Status: ACTIVE | COMMUNITY
Start: 2022-08-08 | End: 1900-01-01

## 2022-08-08 RX ORDER — BLOOD-GLUCOSE TRANSMITTER
EACH MISCELLANEOUS
Qty: 1 | Refills: 0 | Status: ACTIVE | COMMUNITY
Start: 2022-08-08 | End: 1900-01-01

## 2022-08-08 RX ORDER — BLOOD-GLUCOSE,RECEIVER,CONT
EACH MISCELLANEOUS
Qty: 1 | Refills: 0 | Status: ACTIVE | COMMUNITY
Start: 2022-08-08 | End: 1900-01-01

## 2022-08-08 NOTE — HISTORY OF PRESENT ILLNESS
[FreeTextEntry1] : Patient presenting today for follow up for early pregnancy. MFM consult today for uncontrolled diabetes, starting insulin today. She is requesting due date letter, denies other questions or concerns at this time.

## 2022-08-08 NOTE — OB HISTORY
[Pregnancy History] : Vaginal delivery [___] : Delivery occurred at [unfilled] [DEBBY: ___] : DEBBY: [unfilled] [EGA: ___ wks] : EGA: [unfilled] wks [Spontaneous] : Spontaneous conception [Sonogram] : sonogram [at ___ wks] : at [unfilled] weeks

## 2022-08-08 NOTE — DISCUSSION/SUMMARY
[FreeTextEntry1] : 36yo  at 6w1d, DEBBY 2023, dated by [facility], here for initial MFM consult visit for poorly controlled T2DM. Co-managed by .\par \par #T2DM\par -  A1c 15%\par - FS today 276 (3hr after pizza)\par - Finger stick log reviewed:\par *Fastin-180 (8 out of 9 values elevated)\par *2hr PP:  (17 out of 26 values elevated)\par - CURRENT REGIMEN: metformin 1000mg BID, basaglar 40u qd, Admelog 10u TID \par - NEW REGIMEN: NPH 23u in AM/ 9u at Bedtime; Novolog \par \par - We discussed the risk of end organ damage, including eyes, heart, genitalia, kidneys in patients with diabetes, as well as the risk of coma and death in patients with uncontrolled diabetes.  \par Counseling:  \par 1. The patient will be evaluated by a nutritionist and instructed in adhering to a diabetic diet. \par 2. She was counseled by a nurse and instructed in capillary blood glucose testing (fasting and 2 hours after each meal). \par 3. The significance and usual management of diabetes in pregnancy were reviewed, including risks of preeclampsia, birth defects, miscarriage, Intra-Uterine Fetal Demise, macrosomia, birth trauma, pre-term labor,  section, NICU admission (the main reasons include  hypoglycemia and  jaundice) and the possible need for insulin therapy. \par 4. Exercise was encouraged. Ideally, she should walk briskly after each meal. \par \par Recommendations: \par 1. Glycemic Control. Target glucose ranges to minimize excessive fetal growth are: Fasting 60-90 mg/dl; preprandial  mg/dl; and 2-hour postprandial < 120 mg/dl. If these values are elevated, insulin therapy is encouraged, although oral hypoglycemic agents are reasonable to try depending on the finger stick log. \par 2. Timing of Delivery. If spontaneous delivery has not occurred by 39 weeks gestation, delivery may be planned between 39-40 weeks. If complications, such as preeclampsia, macrosomia or poor glucose control develop, then delivery plans may need to be revised. \par 3. Route of delivery. Diabetes is associated with about a six-fold risk for shoulder dystocia. Operative vaginal deliveries should be approached with caution if at all. \par 4. Glucose control in labor. During labor, capillary glucose values should be checked every few hours (depending on their stability). Insulin may be required to cover elevated glucose levels. \par ** Antepartum testing. Daily fetal movement counts are recommended from 28 weeks. Weekly or twice weekly biophysical profiles are recommended starting at 32 weeks gestation, depending on the glucose control.  \par **Ultrasound assessment of fetal growth monthly is recc. \par \par Recommend: \par 1.24 hour urine protein/creatinine collection \par 2.CMP, Hemoglobin A1C \par 3.Dilated eye exam and foot exam \par 4.Fingerstick log \par 5.EKG \par 6.Fetal echocardiogram @20w \par 7. Dietician consult. \par 8. Aspirin 81 mg PO daily to start prior to 12 weeks gestation. \par \par #pregnancy\par - c/w prenatal care with \par - bleeding precautions\par - PNV\par \par

## 2022-08-08 NOTE — END OF VISIT
[FreeTextEntry3] : [facility] Staff\par \par I saw and evaluated Ms. ARORA with Dr. Flowers.\par \par We discussed the risk of end organ damage, including eyes, heart, genitalia, kidneys in patients with diabetes, as well as the risk of coma and death in patients with uncontrolled diabetes. \par \par Counseling: \par \par 1. The patient will be evaluated by a nutritionist and instructed in adhering to a diabetic diet.\par \par 2. She was counseled by a nurse and instructed in capillary blood glucose testing (fasting and 2 hours after each meal).\par \par 3. The significance and usual management of diabetes in pregnancy were reviewed, including risks of preeclampsia, birth defects, miscarriage, Intra-Uterine Fetal Demise, macrosomia, birth trauma, pre-term labor,  section, NICU admission (the main reasons include  hypoglycemia and  jaundice) and the possible need for insulin therapy.\par \par 4. Exercise was encouraged. Ideally, she should walk briskly after each meal.\par \par Recommendations:\par \par 1. Glycemic Control. Target glucose ranges to minimize excessive fetal growth are: Fasting 60-90 mg/dl; preprandial  mg/dl; and 2-hour postprandial < 120 mg/dl. If these values are elevated, insulin therapy is encouraged, although oral hypoglycemic agents are reasonable to try depending on the finger stick log.\par \par 2. Antepartum testing. Daily fetal movement counts are recommended from 28 weeks. Weekly or twice weekly biophysical profiles are recommended starting at 32 weeks gestation, depending on the glucose control. Ultrasound assessment of fetal growth and macrosomic features is recommended.\par \par 3. Timing of Delivery. If spontaneous delivery has not occurred by 39 weeks gestation, delivery may be planned between 39-40 weeks. If complications, such as preeclampsia, macrosomia or poor glucose control develop, then delivery plans may need to be revised.\par \par 4. Route of delivery. Diabetes is associated with about a six-fold risk for shoulder dystocia (which includes the risk of irreversible nerve, bone, and brain injury). Operative vaginal deliveries should be approached with caution if at all.\par \par 5. Glucose control in labor. During labor, capillary glucose values should be checked every few hours (depending on their stability). Insulin may be required to cover elevated glucose levels.\par \par Recommend:\par \par 1.	24 hour urine protein/creatinine collection\par 2.	CMP,  Hemoglobin A1C\par 3.	Dilated eye exam and foot exam\par 4.	Fingerstick log\par 5.	Cardiology consult, EKG\par 6.	Fetal echocardiogram.\par 7.       Dietician consult.\par 8.       Aspirin 81 mg PO daily to start at around 12 weeks gestation.\par \par Ms. Arora was in DKA in July and it is at that time that she found out that she was pregnant.  She was started on metformin 1000 mg BID, basaglar 40u qd, Admelog 10u TID (discontinued Trulicity).  Patient has been on this regimen since . Admits to missing 1-2 doses of Admelog per day.   Her fingersticks are almost all elevated.  The fingerstick in the office was elevated as well.  We will change the regimen above to  NPH 23u in AM/ 9u at Bedtime; Novolog .  Glucagon and glucose tablets were sent to the pharmacy.\par \par Ms. Arora was a difficult social situation but she says she has a  who is helping.\par \par Ms. Arora understands that on ultrasound today the CRL was measuring at 6 weeks 1 day.  We discussed that it is possible that this will not be a viable pregnancy.\par \par Prenatal care is with Dr. Guevara.\par Follow up in around one week with the fingerstick log.\par Hypoglycemia precautions discussed.\par \par Oswaldo Ervin MD

## 2022-08-08 NOTE — HISTORY OF PRESENT ILLNESS
[FreeTextEntry1] : 38yo  at 6w1d, DEBBY 2023, dated by [facility], here for initial MFM consult visit for poorly controlled T2DM. Co-managed by .\par \par Currently denies vaginal bleeding, abnormal vaginal discharge, or abdominal pain. Reports mild nausea, no vomiting. \par \par Patient recently admitted at [facility] ICU on  for treatmnet of DKA with history of non-compliance with T2DM home regimen (Trulicity, Metformin, Basaglar).  A1c 15%. Patient found to be pregnant during admission and followed by GYN team for b-hcg trend. \par \par Patient seen by endocrinology during hospitalization and started on a regimen of metformin 1000mg BID, basaglar 40u qd, Admelog 10u TID (discontinued Trulicity).  Patient has been on this regimen since . Admits to missing 1-2 doses of Admelog per day. \par \par Finger stick log reviewed:\par *Fastin-180 (8 out of 9 values elevated)\par *2hr PP:  (17 out of 26 values elevated)\par \par In last pregnancy, she was on NPH and Novalog.\par \par Social:\par Patient currently having complex social issues with father of her other children and is currently homeless, living at her [employer].

## 2022-08-08 NOTE — PLAN
[FreeTextEntry1] : A/P: 38yo  @ 6+1 today with +SIUP visible today on official US, EDC 23\par \par Initial labs today ordered by MFM\par Still pending: NIPT (to be drawn at 10w, patient aware), Pap, exam \par \par Due date letter provided to patient \par \par Pt encouraged to bring Glucose log to each visit \par \par RTC in 1 week for initial prenatal visit

## 2022-08-09 ENCOUNTER — NON-APPOINTMENT (OUTPATIENT)
Age: 37
End: 2022-08-09

## 2022-08-09 DIAGNOSIS — O24.311 UNSPECIFIED PRE-EXISTING DIABETES MELLITUS IN PREGNANCY, FIRST TRIMESTER: ICD-10-CM

## 2022-08-09 DIAGNOSIS — Z3A.01 LESS THAN 8 WEEKS GESTATION OF PREGNANCY: ICD-10-CM

## 2022-08-09 DIAGNOSIS — O09.521 SUPERVISION OF ELDERLY MULTIGRAVIDA, FIRST TRIMESTER: ICD-10-CM

## 2022-08-09 DIAGNOSIS — O09.529 SUPERVISION OF ELDERLY MULTIGRAVIDA, UNSPECIFIED TRIMESTER: ICD-10-CM

## 2022-08-09 DIAGNOSIS — O26.841 UTERINE SIZE-DATE DISCREPANCY, FIRST TRIMESTER: ICD-10-CM

## 2022-08-09 DIAGNOSIS — O36.80X0 PREGNANCY WITH INCONCLUSIVE FETAL VIABILITY, NOT APPLICABLE OR UNSPECIFIED: ICD-10-CM

## 2022-08-09 DIAGNOSIS — O24.111 PRE-EXISTING TYPE 2 DIABETES MELLITUS, IN PREGNANCY, FIRST TRIMESTER: ICD-10-CM

## 2022-08-14 ENCOUNTER — EMERGENCY (EMERGENCY)
Facility: HOSPITAL | Age: 37
LOS: 0 days | Discharge: HOME | End: 2022-08-14
Attending: STUDENT IN AN ORGANIZED HEALTH CARE EDUCATION/TRAINING PROGRAM | Admitting: STUDENT IN AN ORGANIZED HEALTH CARE EDUCATION/TRAINING PROGRAM

## 2022-08-14 VITALS
RESPIRATION RATE: 18 BRPM | SYSTOLIC BLOOD PRESSURE: 99 MMHG | HEART RATE: 85 BPM | WEIGHT: 164.02 LBS | OXYGEN SATURATION: 100 % | HEIGHT: 61 IN | DIASTOLIC BLOOD PRESSURE: 58 MMHG | TEMPERATURE: 98 F

## 2022-08-14 VITALS — SYSTOLIC BLOOD PRESSURE: 103 MMHG | DIASTOLIC BLOOD PRESSURE: 59 MMHG

## 2022-08-14 DIAGNOSIS — R10.30 LOWER ABDOMINAL PAIN, UNSPECIFIED: ICD-10-CM

## 2022-08-14 DIAGNOSIS — O20.0 THREATENED ABORTION: ICD-10-CM

## 2022-08-14 DIAGNOSIS — R10.84 GENERALIZED ABDOMINAL PAIN: ICD-10-CM

## 2022-08-14 DIAGNOSIS — O20.9 HEMORRHAGE IN EARLY PREGNANCY, UNSPECIFIED: ICD-10-CM

## 2022-08-14 DIAGNOSIS — Z98.890 OTHER SPECIFIED POSTPROCEDURAL STATES: Chronic | ICD-10-CM

## 2022-08-14 DIAGNOSIS — O26.891 OTHER SPECIFIED PREGNANCY RELATED CONDITIONS, FIRST TRIMESTER: ICD-10-CM

## 2022-08-14 DIAGNOSIS — Z79.84 LONG TERM (CURRENT) USE OF ORAL HYPOGLYCEMIC DRUGS: ICD-10-CM

## 2022-08-14 DIAGNOSIS — Z3A.01 LESS THAN 8 WEEKS GESTATION OF PREGNANCY: ICD-10-CM

## 2022-08-14 DIAGNOSIS — O24.311 UNSPECIFIED PRE-EXISTING DIABETES MELLITUS IN PREGNANCY, FIRST TRIMESTER: ICD-10-CM

## 2022-08-14 DIAGNOSIS — Z79.4 LONG TERM (CURRENT) USE OF INSULIN: ICD-10-CM

## 2022-08-14 LAB
ALBUMIN SERPL ELPH-MCNC: 3.8 G/DL — SIGNIFICANT CHANGE UP (ref 3.5–5.2)
ALP SERPL-CCNC: 71 U/L — SIGNIFICANT CHANGE UP (ref 30–115)
ALT FLD-CCNC: 16 U/L — SIGNIFICANT CHANGE UP (ref 0–41)
ANION GAP SERPL CALC-SCNC: 11 MMOL/L — SIGNIFICANT CHANGE UP (ref 7–14)
APPEARANCE UR: CLEAR — SIGNIFICANT CHANGE UP
AST SERPL-CCNC: 19 U/L — SIGNIFICANT CHANGE UP (ref 0–41)
B-OH-BUTYR SERPL-SCNC: 0.2 MMOL/L — SIGNIFICANT CHANGE UP
BACTERIA # UR AUTO: NEGATIVE — SIGNIFICANT CHANGE UP
BASOPHILS # BLD AUTO: 0.02 K/UL — SIGNIFICANT CHANGE UP (ref 0–0.2)
BASOPHILS NFR BLD AUTO: 0.3 % — SIGNIFICANT CHANGE UP (ref 0–1)
BILIRUB SERPL-MCNC: 0.3 MG/DL — SIGNIFICANT CHANGE UP (ref 0.2–1.2)
BILIRUB UR-MCNC: NEGATIVE — SIGNIFICANT CHANGE UP
BLD GP AB SCN SERPL QL: SIGNIFICANT CHANGE UP
BUN SERPL-MCNC: 13 MG/DL — SIGNIFICANT CHANGE UP (ref 10–20)
CALCIUM SERPL-MCNC: 8.6 MG/DL — SIGNIFICANT CHANGE UP (ref 8.5–10.1)
CHLORIDE SERPL-SCNC: 104 MMOL/L — SIGNIFICANT CHANGE UP (ref 98–110)
CO2 SERPL-SCNC: 21 MMOL/L — SIGNIFICANT CHANGE UP (ref 17–32)
COLOR SPEC: YELLOW — SIGNIFICANT CHANGE UP
CREAT SERPL-MCNC: 0.6 MG/DL — LOW (ref 0.7–1.5)
DIFF PNL FLD: ABNORMAL
EGFR: 117 ML/MIN/1.73M2 — SIGNIFICANT CHANGE UP
EOSINOPHIL # BLD AUTO: 0.08 K/UL — SIGNIFICANT CHANGE UP (ref 0–0.7)
EOSINOPHIL NFR BLD AUTO: 1.4 % — SIGNIFICANT CHANGE UP (ref 0–8)
EPI CELLS # UR: 9 /HPF — HIGH (ref 0–5)
GLUCOSE SERPL-MCNC: 185 MG/DL — HIGH (ref 70–99)
GLUCOSE UR QL: ABNORMAL
HCG SERPL-ACNC: 8746 MIU/ML — HIGH
HCT VFR BLD CALC: 36.8 % — LOW (ref 37–47)
HGB BLD-MCNC: 12.1 G/DL — SIGNIFICANT CHANGE UP (ref 12–16)
HYALINE CASTS # UR AUTO: 2 /LPF — SIGNIFICANT CHANGE UP (ref 0–7)
IMM GRANULOCYTES NFR BLD AUTO: 0.2 % — SIGNIFICANT CHANGE UP (ref 0.1–0.3)
KETONES UR-MCNC: ABNORMAL
LEUKOCYTE ESTERASE UR-ACNC: ABNORMAL
LIDOCAIN IGE QN: 30 U/L — SIGNIFICANT CHANGE UP (ref 7–60)
LYMPHOCYTES # BLD AUTO: 1.29 K/UL — SIGNIFICANT CHANGE UP (ref 1.2–3.4)
LYMPHOCYTES # BLD AUTO: 22.2 % — SIGNIFICANT CHANGE UP (ref 20.5–51.1)
MCHC RBC-ENTMCNC: 26.8 PG — LOW (ref 27–31)
MCHC RBC-ENTMCNC: 32.9 G/DL — SIGNIFICANT CHANGE UP (ref 32–37)
MCV RBC AUTO: 81.4 FL — SIGNIFICANT CHANGE UP (ref 81–99)
MONOCYTES # BLD AUTO: 0.38 K/UL — SIGNIFICANT CHANGE UP (ref 0.1–0.6)
MONOCYTES NFR BLD AUTO: 6.5 % — SIGNIFICANT CHANGE UP (ref 1.7–9.3)
NEUTROPHILS # BLD AUTO: 4.04 K/UL — SIGNIFICANT CHANGE UP (ref 1.4–6.5)
NEUTROPHILS NFR BLD AUTO: 69.4 % — SIGNIFICANT CHANGE UP (ref 42.2–75.2)
NITRITE UR-MCNC: NEGATIVE — SIGNIFICANT CHANGE UP
NRBC # BLD: 0 /100 WBCS — SIGNIFICANT CHANGE UP (ref 0–0)
PH UR: 6 — SIGNIFICANT CHANGE UP (ref 5–8)
PLATELET # BLD AUTO: 269 K/UL — SIGNIFICANT CHANGE UP (ref 130–400)
POTASSIUM SERPL-MCNC: 4.5 MMOL/L — SIGNIFICANT CHANGE UP (ref 3.5–5)
POTASSIUM SERPL-SCNC: 4.5 MMOL/L — SIGNIFICANT CHANGE UP (ref 3.5–5)
PROT SERPL-MCNC: 6.6 G/DL — SIGNIFICANT CHANGE UP (ref 6–8)
PROT UR-MCNC: SIGNIFICANT CHANGE UP
RBC # BLD: 4.52 M/UL — SIGNIFICANT CHANGE UP (ref 4.2–5.4)
RBC # FLD: 13.4 % — SIGNIFICANT CHANGE UP (ref 11.5–14.5)
RBC CASTS # UR COMP ASSIST: 4 /HPF — SIGNIFICANT CHANGE UP (ref 0–4)
SODIUM SERPL-SCNC: 136 MMOL/L — SIGNIFICANT CHANGE UP (ref 135–146)
SP GR SPEC: 1.02 — SIGNIFICANT CHANGE UP (ref 1.01–1.03)
UROBILINOGEN FLD QL: SIGNIFICANT CHANGE UP
WBC # BLD: 5.82 K/UL — SIGNIFICANT CHANGE UP (ref 4.8–10.8)
WBC # FLD AUTO: 5.82 K/UL — SIGNIFICANT CHANGE UP (ref 4.8–10.8)
WBC UR QL: 2 /HPF — SIGNIFICANT CHANGE UP (ref 0–5)

## 2022-08-14 PROCEDURE — 99285 EMERGENCY DEPT VISIT HI MDM: CPT

## 2022-08-14 PROCEDURE — 76830 TRANSVAGINAL US NON-OB: CPT | Mod: 26

## 2022-08-14 RX ORDER — ACETAMINOPHEN 500 MG
650 TABLET ORAL ONCE
Refills: 0 | Status: COMPLETED | OUTPATIENT
Start: 2022-08-14 | End: 2022-08-14

## 2022-08-14 RX ORDER — CEFDINIR 250 MG/5ML
1 POWDER, FOR SUSPENSION ORAL
Qty: 20 | Refills: 0
Start: 2022-08-14 | End: 2022-08-23

## 2022-08-14 RX ADMIN — Medication 650 MILLIGRAM(S): at 12:43

## 2022-08-14 RX ADMIN — Medication 650 MILLIGRAM(S): at 12:13

## 2022-08-14 NOTE — ED PROVIDER NOTE - OBJECTIVE STATEMENT
38 y/o F  currently 6 weeks pregnant by LMP, with PMH of poorly controlled IDDM and recent admission for DKA -22 after ED presentation for pregnancy test, presents to ED for evaluation of lower abdominal cramping and vaginal spotting over the last 3 days. Pt reports cramps are intermittent, lower abdominal and non-radiating, without modifying or relieving factors. States over this time she has noticed some "light" vaginal spotting requiring her to wear pads (not soaking through) as well as some blood when wiping. States she had an increase in blood today and small clot so came to ED for evaluation. States she otherwise feels well without fever/chills, CP, SOB, n/v/d, vaginal discharge. Denies trauma. Sexually active with 1 male partner without protection.

## 2022-08-14 NOTE — ED PROVIDER NOTE - PHYSICAL EXAMINATION
VITAL SIGNS: I have reviewed nursing notes and confirm.  CONSTITUTIONAL: Well-developed; well-nourished; in no acute distress.  SKIN: Skin exam is warm and dry, no acute rash.  EYES: PERRL, conjunctiva and sclera clear.  ENT: MMM  CARD: S1, S2 normal; no murmurs, gallops, or rubs. Regular rate and rhythm.  RESP: Normal work of breathing, lungs CTAB. No wheezes, rales or rhonchi.  ABD: soft, ND, (+) mild generalized lower abdominal TTP worse to suprapubic region without rebound or guarding, no rash.   EXT: Normal ROM. No clubbing, cyanosis or edema.  NEURO: Alert, oriented. Grossly unremarkable. No focal deficits.  PSYCH: Cooperative, appropriate. VITAL SIGNS: I have reviewed nursing notes and confirm.  CONSTITUTIONAL: Well-developed; well-nourished; in no acute distress.  SKIN: Skin exam is warm and dry, no acute rash.  EYES: PERRL, conjunctiva and sclera clear. no conjunctival pallor  ENT: MMM  CARD: S1, S2 normal; no murmurs, gallops, or rubs. Regular rate and rhythm.  RESP: Normal work of breathing, lungs CTAB. No wheezes, rales or rhonchi.  ABD: soft, ND, (+) mild generalized lower abdominal TTP worse to suprapubic region without rebound or guarding, no rash.   EXT: Normal ROM. No clubbing, cyanosis or edema.  NEURO: Alert, oriented. Grossly unremarkable. No focal deficits.  PSYCH: Cooperative, appropriate. VITAL SIGNS: I have reviewed nursing notes and confirm.  CONSTITUTIONAL: Well-developed; well-nourished; in no acute distress.  SKIN: Skin exam is warm and dry, no acute rash.  EYES: PERRL, conjunctiva and sclera clear. no conjunctival pallor  ENT: MMM  CARD: S1, S2 normal; no murmurs, gallops, or rubs. Regular rate and rhythm.  RESP: Normal work of breathing, lungs CTAB. No wheezes, rales or rhonchi.  ABD: soft, ND, (+) mild generalized lower abdominal TTP worse to suprapubic region without rebound or guarding, no rash.    exam chaperoned by Dr. Reyes shows closed cervial OS with minimal blood in vaginal canal, no CMT or adnexal tenderness.   EXT: Normal ROM. No clubbing, cyanosis or edema.  NEURO: Alert, oriented. Grossly unremarkable. No focal deficits.  PSYCH: Cooperative, appropriate.

## 2022-08-14 NOTE — ED PROVIDER NOTE - CARE PROVIDER_API CALL
Oswaldo Ervin)  MaternalFetal Medicine; Medical Genetics; Obstetrics and Gynecology  [address] [place] [address]  Phone: [phone]  Fax: [phone]  Established Patient  Follow Up Time: 1-3 Days

## 2022-08-14 NOTE — ED PROVIDER NOTE - ATTENDING CONTRIBUTION TO CARE
37F  at approximately 7wks pregnant (LMP ) with PMH poorly controlled T2DM, A1c 15 (), ICU admission for DKA -, followed by Dr. Ervin/Paola who presents to [facility] for vaginal bleeding and pelvic cramping. She reports cramping started ~1 week ago, small vb started a few days ago but today she noticed small clot as well. Denies lof, fever, chills, cp, sob, abn vaginal discharge. Of note, she recently f/u with MFM  (Dr. Ervin) and her DM regimen was adjusted to NPH 23U/9U, Novolog / and is compliant.     VS noted, triage note reviewed    Gen - NAD, Head - NCAT, Pharynx - clear, MMM, Heart - RRR, no m/g/r, Lungs - CTAB, no w/c/r, Abdomen - soft, NT, ND, Skin - No rash, Extremities - FROM, no edema, erythema, ecchymosis, brisk cap refill, Neuro - A&O x3, equal strength and sensation, non-focal exam, - chaperone Dr. Man- small brb in the vaginal vault, no clots, no pooling of blood. Cervix closed    a/p:  vaginal bleeding/cramping at ~7weeks  threatened   labs, tvus   reassess  reassess

## 2022-08-14 NOTE — ED PROVIDER NOTE - NS ED ROS FT
Constitutional: See HPI. No fever/chills.  Cardiac: No chest pain, SOB or edema. No chest pain with exertion.  Respiratory: No cough or respiratory distress. No hemoptysis.   GI: (+)Abdominal cramping. No nausea, vomiting, diarrhea  : (+)Vaginal spotting. No dysuria, frequency or burning.   MS: No myalgia, muscle weakness, joint pain or back pain.  Skin: No rash.  Endocrine: (+)HX of IDDM poorly compliant  Except as documented in the HPI, all other systems are negative.

## 2022-08-14 NOTE — ED ADULT NURSE NOTE - OBJECTIVE STATEMENT
Pt. came to ED c/o vaginal spotting that became worse today. Pt. 7 weeks pregnant. Pregnancy #4. Denies pain.

## 2022-08-14 NOTE — ED PROVIDER NOTE - PATIENT PORTAL LINK FT
You can access the FollowMyHealth Patient Portal offered by [facility] by registering at the following website: [email or web address]. By joining [facility]’s FollowMyHealth portal, you will also be able to view your health information using other applications (apps) compatible with our system.

## 2022-08-14 NOTE — ED PROVIDER NOTE - NSFOLLOWUPINSTRUCTIONS_ED_ALL_ED_FT
Please follow up in 1-3 days with your OBGYN. Cefdinir antibiotic sent to pharmacy for UTI.     Vaginal Bleeding During Pregnancy, First Trimester      A small amount of bleeding from the vagina, or spotting, is common during early pregnancy. Some bleeding may be related to the pregnancy, and some may not. In many cases, the bleeding is normal and is not a problem. However, bleeding can also be a sign of something serious.    Normal things that may cause bleeding during the first trimester:  •Implantation of the fertilized egg in the lining of the uterus.      •Rapid changes in blood vessels. This is caused by changes that are happening to the body during pregnancy.      •Sex.      •Pelvic exams.      Abnormal things that may cause bleeding during the first trimester include:  •Infection or inflammation of the cervix.      •Growths or polyps on the cervix.      •Miscarriage or threatened miscarriage.      •Pregnancy that is growing outside of the uterus (ectopic pregnancy).      •A fertilized egg that becomes a mass of tissue (molar pregnancy).      Tell your health care provider right away if there is any bleeding from your vagina.      Follow these instructions at home:      Monitoring your bleeding      Monitor your bleeding.  •Pay attention to any changes in your symptoms. Let your health care provider know about any concerns.      •Try to understand when the bleeding occurs. Does the bleeding start on its own, or does it start after something is done, such as sex or a pelvic exam?    •Use a diary to record the things you see about your bleeding, including:   •The kind of bleeding you are having. Does the bleeding start and stop irregularly, or is it a constant flow?      •The severity of your bleeding. Is the bleeding heavy or light?      •The number of pads you use each day, how often you change them, and how soaked they are.        •Tell your health care provider if you pass tissue. He or she may want to see it.      Activity     •Follow instructions from your health care provider about limiting your activity. Ask what activities are safe for you.      • Do not have sex until your health care provider says that this is safe.      •If needed, make plans for someone to help with your regular activities.      General instructions     •Take over-the-counter and prescription medicines only as told by your health care provider.      • Do not take aspirin because it can cause bleeding.      • Do not use tampons or douche.      •Keep all follow-up visits. This is important.        Contact a health care provider if:    •You have vaginal bleeding during any part of your pregnancy.      •You have cramps or labor pains.      •You have a fever or chills.        Get help right away if:    •You have severe cramps in your back or abdomen.      •You pass large clots or a large amount of tissue from your vagina.      •Your bleeding increases.      •You feel light-headed or weak, or you faint.      •You are leaking fluid or have a gush of fluid from your vagina.        Summary    •A small amount of bleeding from the vagina is common during early pregnancy.      •Be sure to tell your health care provider about any vaginal bleeding right away.      •Try to understand when bleeding occurs. Does bleeding occur on its own, or does it occur after something is done, such as sex or pelvic exams?      •Keep all follow-up visits. This is important.      This information is not intended to replace advice given to you by your health care provider. Make sure you discuss any questions you have with your health care provider.

## 2022-08-14 NOTE — ED PROVIDER NOTE - CLINICAL SUMMARY MEDICAL DECISION MAKING FREE TEXT BOX
37F  at approximately 7wks pregnant (LMP ) with PMH poorly controlled T2DM, A1c 15 (), ICU admission for DKA -, followed by Dr. Ervin/Paola who presents to [facility] for vaginal bleeding and pelvic cramping. Labs and imaging reviewed. +IUP ~6wks pregnant, no FHT however may be too early, with 1.5cm subchorionic hematoma. Very small amount of blood in vaginal vault, not pooling or clots. Explained threatened pregnancy to patient, recommend close f/u for repeat sono and low threshold for return to the ED incl worsening bleeding or pain. I have fully discussed the medical management and delivery of care with the patient. I have discussed any available labs, imaging and treatment options with the patient. All Questions answered at the bedside and printed copies of all results provided and recommended to review with PCP. Patient confirms understanding and has been given detailed return precautions. Patient instructed to return to the ED should symptoms persist or worsen. Patient has demonstrated capacity and has verbalized understanding. Patient is well appearing upon discharge, ambulatory with a steady gait.

## 2022-08-15 ENCOUNTER — APPOINTMENT (OUTPATIENT)
Dept: OBGYN | Facility: CLINIC | Age: 37
End: 2022-08-15

## 2022-08-15 ENCOUNTER — APPOINTMENT (OUTPATIENT)
Dept: ANTEPARTUM | Facility: CLINIC | Age: 37
End: 2022-08-15

## 2022-08-15 ENCOUNTER — ASOB RESULT (OUTPATIENT)
Age: 37
End: 2022-08-15

## 2022-08-15 ENCOUNTER — OUTPATIENT (OUTPATIENT)
Dept: OUTPATIENT SERVICES | Facility: HOSPITAL | Age: 37
LOS: 1 days | Discharge: HOME | End: 2022-08-15

## 2022-08-15 ENCOUNTER — NON-APPOINTMENT (OUTPATIENT)
Age: 37
End: 2022-08-15

## 2022-08-15 DIAGNOSIS — Z98.890 OTHER SPECIFIED POSTPROCEDURAL STATES: Chronic | ICD-10-CM

## 2022-08-15 LAB
CULTURE RESULTS: SIGNIFICANT CHANGE UP
GLUCOSE BLDC GLUCOMTR-MCNC: 218 MG/DL — HIGH (ref 70–99)
SPECIMEN SOURCE: SIGNIFICANT CHANGE UP

## 2022-08-15 PROCEDURE — 99212 OFFICE O/P EST SF 10 MIN: CPT | Mod: 25

## 2022-08-15 PROCEDURE — 76817 TRANSVAGINAL US OBSTETRIC: CPT | Mod: 26

## 2022-08-15 PROCEDURE — ZZZZZ: CPT

## 2022-08-16 ENCOUNTER — OUTPATIENT (OUTPATIENT)
Dept: OUTPATIENT SERVICES | Facility: HOSPITAL | Age: 37
LOS: 1 days | Discharge: HOME | End: 2022-08-16

## 2022-08-16 ENCOUNTER — ASOB RESULT (OUTPATIENT)
Age: 37
End: 2022-08-16

## 2022-08-16 ENCOUNTER — APPOINTMENT (OUTPATIENT)
Dept: ANTEPARTUM | Facility: CLINIC | Age: 37
End: 2022-08-16

## 2022-08-16 ENCOUNTER — APPOINTMENT (OUTPATIENT)
Dept: OBGYN | Facility: CLINIC | Age: 37
End: 2022-08-16

## 2022-08-16 DIAGNOSIS — Z98.890 OTHER SPECIFIED POSTPROCEDURAL STATES: Chronic | ICD-10-CM

## 2022-08-16 PROCEDURE — 76817 TRANSVAGINAL US OBSTETRIC: CPT | Mod: 26

## 2022-08-16 PROCEDURE — 99212 OFFICE O/P EST SF 10 MIN: CPT | Mod: 25

## 2022-08-16 NOTE — DISCUSSION/SUMMARY
[FreeTextEntry1] : Maternal Fetal Medicine US note: \par \par 1.  AN INTRAUTERINE GESTATIONAL SAC AND YOLK SAC ARE NOTED.\par 2.  A FETAL POLE IS PRESENT WITHOUT CARDIAC ACTIVITY.\par \par THESE FINDINGS ARE CONSISTENT WITH A FAILED PREGNANCY OR MISSED .\par Ms Arora and her  were counseled.  I emphasized that we do not understand completely why these things occur. I also emphasized the opportunity to achieve tighter BS control prior to her next pregnancy. \par \par MD Jacky, FACOG

## 2022-08-18 ENCOUNTER — APPOINTMENT (OUTPATIENT)
Dept: OBGYN | Facility: CLINIC | Age: 37
End: 2022-08-18

## 2022-08-18 ENCOUNTER — OUTPATIENT (OUTPATIENT)
Dept: OUTPATIENT SERVICES | Facility: HOSPITAL | Age: 37
LOS: 1 days | Discharge: HOME | End: 2022-08-18

## 2022-08-18 VITALS
BODY MASS INDEX: 30.58 KG/M2 | OXYGEN SATURATION: 99 % | WEIGHT: 167.19 LBS | HEART RATE: 88 BPM | DIASTOLIC BLOOD PRESSURE: 68 MMHG | SYSTOLIC BLOOD PRESSURE: 102 MMHG

## 2022-08-18 DIAGNOSIS — Z98.890 OTHER SPECIFIED POSTPROCEDURAL STATES: Chronic | ICD-10-CM

## 2022-08-18 PROCEDURE — 99214 OFFICE O/P EST MOD 30 MIN: CPT

## 2022-08-18 PROCEDURE — 76857 US EXAM PELVIC LIMITED: CPT | Mod: 26

## 2022-08-18 RX ORDER — DOXYCYCLINE 100 MG/1
100 TABLET, FILM COATED ORAL
Qty: 2 | Refills: 0 | Status: ACTIVE | COMMUNITY
Start: 2022-08-18 | End: 1900-01-01

## 2022-08-18 NOTE — HISTORY OF PRESENT ILLNESS
[FreeTextEntry1] : Patient presenting day for management of early pregnancy loss. \par \par She is presenting today requesting additional imaging to confirm pregnancy loss. She denies pain or bleeding, endorses mild neausea. Denies HA, CP, SOB, F/C. She reports blood sugars have been about 150s this past week. \par \par

## 2022-08-18 NOTE — PLAN
[FreeTextEntry1] : TAUS: CR[facility] c/w 6w, no cardiac activity\par \par A/P: 36yo with early pregnancy, desiring procedural management. \par \par Early pregnancy loss: Patient was counseled about her options of expectant, medical, and surgical management. She requests surgical management.  \par \par Risks and benefits of the procedure were explained, including risk of bleeding, infection, uterine perforation and damage to nearby structures, cervical laceration, retained products of conception and need for re-aspiration.   \par \par Presurgical testing requested - scheduled for 8/19/22 @ 9a, pt aware \par \par Patient will be scheduled for D and C procedure.  \par She was given doxycycline 200 mg PO to take at home with dinner tonight to take the night before the procedure and will receive an additional 100 mg PO in recovery after her procedure.   \par Patient understands COVID testing required within 72 hours at [address] or else procedure may be delayed or canceled. \par \par We reviewed preop and postoperative instructions with her, including NPO instructions. \par \par social work follow up today

## 2022-08-18 NOTE — PHYSICAL EXAM
[Chaperone Present] : A chaperone was present in the examining room during all aspects of the physical examination [FreeTextEntry1] : Julia ZHOU RN  [Appropriately responsive] : appropriately responsive [Alert] : alert [No Acute Distress] : no acute distress [Soft] : soft [Non-tender] : non-tender [No Lesions] : no lesions [Oriented x3] : oriented x3

## 2022-08-19 ENCOUNTER — NON-APPOINTMENT (OUTPATIENT)
Age: 37
End: 2022-08-19

## 2022-08-19 ENCOUNTER — OUTPATIENT (OUTPATIENT)
Dept: OUTPATIENT SERVICES | Facility: HOSPITAL | Age: 37
LOS: 1 days | Discharge: HOME | End: 2022-08-19

## 2022-08-19 VITALS
DIASTOLIC BLOOD PRESSURE: 59 MMHG | RESPIRATION RATE: 15 BRPM | HEIGHT: 61.5 IN | TEMPERATURE: 99 F | SYSTOLIC BLOOD PRESSURE: 109 MMHG | WEIGHT: 167.33 LBS | HEART RATE: 87 BPM | OXYGEN SATURATION: 100 %

## 2022-08-19 DIAGNOSIS — Z98.890 OTHER SPECIFIED POSTPROCEDURAL STATES: Chronic | ICD-10-CM

## 2022-08-19 DIAGNOSIS — Z3A.01 LESS THAN 8 WEEKS GESTATION OF PREGNANCY: ICD-10-CM

## 2022-08-19 DIAGNOSIS — Z01.818 ENCOUNTER FOR OTHER PREPROCEDURAL EXAMINATION: ICD-10-CM

## 2022-08-19 DIAGNOSIS — O24.111 PRE-EXISTING TYPE 2 DIABETES MELLITUS, IN PREGNANCY, FIRST TRIMESTER: ICD-10-CM

## 2022-08-19 DIAGNOSIS — O02.1 MISSED ABORTION: ICD-10-CM

## 2022-08-19 LAB
A1C WITH ESTIMATED AVERAGE GLUCOSE RESULT: 12.5 % — HIGH (ref 4–5.6)
ALBUMIN SERPL ELPH-MCNC: 4.2 G/DL — SIGNIFICANT CHANGE UP (ref 3.5–5.2)
ALP SERPL-CCNC: 73 U/L — SIGNIFICANT CHANGE UP (ref 30–115)
ALT FLD-CCNC: 13 U/L — SIGNIFICANT CHANGE UP (ref 0–41)
ANION GAP SERPL CALC-SCNC: 12 MMOL/L — SIGNIFICANT CHANGE UP (ref 7–14)
APTT BLD: 29.3 SEC — SIGNIFICANT CHANGE UP (ref 27–39.2)
AST SERPL-CCNC: 11 U/L — SIGNIFICANT CHANGE UP (ref 0–41)
BASOPHILS # BLD AUTO: 0.02 K/UL — SIGNIFICANT CHANGE UP (ref 0–0.2)
BASOPHILS NFR BLD AUTO: 0.3 % — SIGNIFICANT CHANGE UP (ref 0–1)
BILIRUB SERPL-MCNC: 0.4 MG/DL — SIGNIFICANT CHANGE UP (ref 0.2–1.2)
BUN SERPL-MCNC: 14 MG/DL — SIGNIFICANT CHANGE UP (ref 10–20)
CALCIUM SERPL-MCNC: 9.4 MG/DL — SIGNIFICANT CHANGE UP (ref 8.5–10.1)
CHLORIDE SERPL-SCNC: 100 MMOL/L — SIGNIFICANT CHANGE UP (ref 98–110)
CO2 SERPL-SCNC: 24 MMOL/L — SIGNIFICANT CHANGE UP (ref 17–32)
CREAT SERPL-MCNC: 0.7 MG/DL — SIGNIFICANT CHANGE UP (ref 0.7–1.5)
EGFR: 113 ML/MIN/1.73M2 — SIGNIFICANT CHANGE UP
EOSINOPHIL # BLD AUTO: 0.04 K/UL — SIGNIFICANT CHANGE UP (ref 0–0.7)
EOSINOPHIL NFR BLD AUTO: 0.7 % — SIGNIFICANT CHANGE UP (ref 0–8)
ESTIMATED AVERAGE GLUCOSE: 312 MG/DL — HIGH (ref 68–114)
GLUCOSE SERPL-MCNC: 248 MG/DL — HIGH (ref 70–99)
HCT VFR BLD CALC: 37.4 % — SIGNIFICANT CHANGE UP (ref 37–47)
HGB BLD-MCNC: 11.9 G/DL — LOW (ref 12–16)
IMM GRANULOCYTES NFR BLD AUTO: 0.3 % — SIGNIFICANT CHANGE UP (ref 0.1–0.3)
INR BLD: 0.96 RATIO — SIGNIFICANT CHANGE UP (ref 0.65–1.3)
LYMPHOCYTES # BLD AUTO: 1.25 K/UL — SIGNIFICANT CHANGE UP (ref 1.2–3.4)
LYMPHOCYTES # BLD AUTO: 21.8 % — SIGNIFICANT CHANGE UP (ref 20.5–51.1)
MCHC RBC-ENTMCNC: 26.4 PG — LOW (ref 27–31)
MCHC RBC-ENTMCNC: 31.8 G/DL — LOW (ref 32–37)
MCV RBC AUTO: 82.9 FL — SIGNIFICANT CHANGE UP (ref 81–99)
MONOCYTES # BLD AUTO: 0.42 K/UL — SIGNIFICANT CHANGE UP (ref 0.1–0.6)
MONOCYTES NFR BLD AUTO: 7.3 % — SIGNIFICANT CHANGE UP (ref 1.7–9.3)
NEUTROPHILS # BLD AUTO: 3.98 K/UL — SIGNIFICANT CHANGE UP (ref 1.4–6.5)
NEUTROPHILS NFR BLD AUTO: 69.6 % — SIGNIFICANT CHANGE UP (ref 42.2–75.2)
NRBC # BLD: 0 /100 WBCS — SIGNIFICANT CHANGE UP (ref 0–0)
PLATELET # BLD AUTO: 271 K/UL — SIGNIFICANT CHANGE UP (ref 130–400)
POTASSIUM SERPL-MCNC: 4.1 MMOL/L — SIGNIFICANT CHANGE UP (ref 3.5–5)
POTASSIUM SERPL-SCNC: 4.1 MMOL/L — SIGNIFICANT CHANGE UP (ref 3.5–5)
PROT SERPL-MCNC: 6.7 G/DL — SIGNIFICANT CHANGE UP (ref 6–8)
PROTHROM AB SERPL-ACNC: 11 SEC — SIGNIFICANT CHANGE UP (ref 9.95–12.87)
RBC # BLD: 4.51 M/UL — SIGNIFICANT CHANGE UP (ref 4.2–5.4)
RBC # FLD: 13.2 % — SIGNIFICANT CHANGE UP (ref 11.5–14.5)
SODIUM SERPL-SCNC: 136 MMOL/L — SIGNIFICANT CHANGE UP (ref 135–146)
WBC # BLD: 5.73 K/UL — SIGNIFICANT CHANGE UP (ref 4.8–10.8)
WBC # FLD AUTO: 5.73 K/UL — SIGNIFICANT CHANGE UP (ref 4.8–10.8)

## 2022-08-19 PROCEDURE — 93010 ELECTROCARDIOGRAM REPORT: CPT

## 2022-08-19 NOTE — H&P PST ADULT - ATTENDING COMMENTS
Patient tolerated doxycycline 200mg PO last night, will receive 100mg post op  PAtient took half long acting insulin last night, is presenting today without complaints.   She was seen in office yesterday and accepts pregnancy loss and desires surgical management. Consent forms signed and witnessed.

## 2022-08-19 NOTE — H&P PST ADULT - ASSESSMENT
A/P: 36yo with early pregnancy loss with PMH significant for insulin dependent diabetes, desiring procedural management.

## 2022-08-19 NOTE — H&P PST ADULT - REASON FOR ADMISSION
Case Type: OP Non-block Time Suite: MIKE Proceduralist: Sonja Guevara  Confirmed Surgery Date Time: 08- - 0:00 PAST Date Time: 08- - 9:30 Procedure: DILATION & CURETTAGE FOR EARLY PREGNANCY LOSS  ERP?: Unavailable Laterality: N/A Length of Procedure: 60 Minutes  Anesthesia Type: General

## 2022-08-19 NOTE — H&P PST ADULT - HISTORY OF PRESENT ILLNESS
37y Female presents today for presurgical testing for DILATION & CURETTAGE FOR EARLY PREGNANCY LOSS. Patient states that on most recent Ob exam, her fetus was found to be without a heartbeat. She states that she continues to feel a fluttering sensation in her abdomen despite this. Patient was instructed to follow up and speak to her proceduralist and to seek a second opinion. She denies pain and offers no other complaints at this time.  Patient denies any CP, palpitations, SOB, cough, or dysuria. No recent URI. + UTI treated 4 days ago.  Stated exercise tolerance is FOS  AMINTA screen reviewed    Patient denies any recent personal exposure to COVID19. Denies any sick contacts. Patient denies travel within the past 30 days. Patient was instructed to quarantine until after procedure.    Anesthesia Alert  NO--Difficult Airway  NO--History of neck surgery or radiation  NO--Limited ROM of neck  NO--History of Malignant hyperthermia  NO--Personal or family history of Pseudocholinesterase deficiency.  NO--Prior Anesthesia Complication  NO--Latex Allergy  NO--Loose teeth, partial upper and lower denture  NO--History of Rheumatoid Arthritis  NO--AMINTA  NO--Bleeding risk  NO--Other_____    Patient states that this is their complete medical history and list of medications.  Patient verbalized understanding of instructions given during this visit and was given the opportunity to ask questions and have them answered. They were instructed to follow up with their surgeon/surgeon's office with any questions regarding their procedure.

## 2022-08-19 NOTE — H&P PST ADULT - NSANTHOSAYNRD_GEN_A_CORE
No. AMINTA screening performed.  STOP BANG Legend: 0-2 = LOW Risk; 3-4 = INTERMEDIATE Risk; 5-8 = HIGH Risk

## 2022-08-21 NOTE — PLAN
[FreeTextEntry1] : TAUS:  +GS, +YS, IUP, no visualized fetal pole \par \par A/P: 38yo @ 5+5 by LMP now with conformed IUP, fetal pole not yet visualized \par \par Patient previously arranged direct admission for glucose controlled, declined admission\par \par Close follow up with MFM for glucose control \par \par RTC to establish pregnancy viability, establish PNC

## 2022-08-21 NOTE — HISTORY OF PRESENT ILLNESS
[FreeTextEntry1] : Patient presenting today for pregnancy localization. Presenting today for follow up of pregnancy of unknown location after presenting to the ED. She is presenting today with some cramping, denies bleeding. LMP 6/25/22 @ 5+5 by LMP.

## 2022-08-23 ENCOUNTER — APPOINTMENT (OUTPATIENT)
Dept: OBGYN | Facility: CLINIC | Age: 37
End: 2022-08-23

## 2022-08-23 ENCOUNTER — OUTPATIENT (OUTPATIENT)
Dept: OUTPATIENT SERVICES | Facility: HOSPITAL | Age: 37
LOS: 1 days | Discharge: HOME | End: 2022-08-23

## 2022-08-23 VITALS — SYSTOLIC BLOOD PRESSURE: 110 MMHG | DIASTOLIC BLOOD PRESSURE: 58 MMHG | HEART RATE: 89 BPM

## 2022-08-23 DIAGNOSIS — Z98.890 OTHER SPECIFIED POSTPROCEDURAL STATES: Chronic | ICD-10-CM

## 2022-08-23 LAB — SARS-COV-2 N GENE NPH QL NAA+PROBE: NOT DETECTED

## 2022-08-23 PROCEDURE — 99213 OFFICE O/P EST LOW 20 MIN: CPT

## 2022-08-23 PROCEDURE — 76857 US EXAM PELVIC LIMITED: CPT | Mod: 26

## 2022-08-23 RX ORDER — DOXYCYCLINE HYCLATE 100 MG/1
100 TABLET ORAL
Qty: 2 | Refills: 0 | Status: ACTIVE | COMMUNITY
Start: 2022-08-23 | End: 1900-01-01

## 2022-08-24 ENCOUNTER — OUTPATIENT (OUTPATIENT)
Dept: OUTPATIENT SERVICES | Facility: HOSPITAL | Age: 37
LOS: 1 days | Discharge: HOME | End: 2022-08-24

## 2022-08-24 ENCOUNTER — TRANSCRIPTION ENCOUNTER (OUTPATIENT)
Age: 37
End: 2022-08-24

## 2022-08-24 ENCOUNTER — RESULT REVIEW (OUTPATIENT)
Age: 37
End: 2022-08-24

## 2022-08-24 VITALS
SYSTOLIC BLOOD PRESSURE: 118 MMHG | TEMPERATURE: 98 F | DIASTOLIC BLOOD PRESSURE: 57 MMHG | WEIGHT: 167.33 LBS | OXYGEN SATURATION: 100 % | RESPIRATION RATE: 18 BRPM | HEART RATE: 78 BPM | HEIGHT: 61.5 IN

## 2022-08-24 VITALS
HEART RATE: 76 BPM | RESPIRATION RATE: 22 BRPM | SYSTOLIC BLOOD PRESSURE: 110 MMHG | DIASTOLIC BLOOD PRESSURE: 60 MMHG | OXYGEN SATURATION: 97 %

## 2022-08-24 DIAGNOSIS — Z98.890 OTHER SPECIFIED POSTPROCEDURAL STATES: Chronic | ICD-10-CM

## 2022-08-24 LAB — GLUCOSE BLDC GLUCOMTR-MCNC: 196 MG/DL — HIGH (ref 70–99)

## 2022-08-24 PROCEDURE — 88304 TISSUE EXAM BY PATHOLOGIST: CPT | Mod: 26

## 2022-08-24 PROCEDURE — 58120 DILATION AND CURETTAGE: CPT

## 2022-08-24 RX ORDER — OXYCODONE AND ACETAMINOPHEN 5; 325 MG/1; MG/1
1 TABLET ORAL EVERY 4 HOURS
Refills: 0 | Status: DISCONTINUED | OUTPATIENT
Start: 2022-08-24 | End: 2022-08-24

## 2022-08-24 RX ORDER — FLUCONAZOLE 150 MG/1
150 TABLET ORAL
Qty: 2 | Refills: 0 | Status: ACTIVE | COMMUNITY
Start: 2022-08-24 | End: 1900-01-01

## 2022-08-24 RX ORDER — SODIUM CHLORIDE 9 MG/ML
1000 INJECTION, SOLUTION INTRAVENOUS
Refills: 0 | Status: DISCONTINUED | OUTPATIENT
Start: 2022-08-24 | End: 2022-09-07

## 2022-08-24 RX ORDER — ONDANSETRON 8 MG/1
4 TABLET, FILM COATED ORAL ONCE
Refills: 0 | Status: DISCONTINUED | OUTPATIENT
Start: 2022-08-24 | End: 2022-09-07

## 2022-08-24 RX ADMIN — Medication 100 MILLIGRAM(S): at 10:30

## 2022-08-24 RX ADMIN — SODIUM CHLORIDE 100 MILLILITER(S): 9 INJECTION, SOLUTION INTRAVENOUS at 09:33

## 2022-08-24 NOTE — ASU DISCHARGE PLAN (ADULT/PEDIATRIC) - CALL YOUR DOCTOR IF YOU HAVE ANY OF THE FOLLOWING:
saturating through 2 pads per hour for more than 1 hour/Bleeding that does not stop/Fever greater than (need to indicate Fahrenheit or Celsius)/Nausea and vomiting that does not stop

## 2022-08-24 NOTE — BRIEF OPERATIVE NOTE - OPERATION/FINDINGS
normal external normal external genitalia   Some evidence of candidiasis   Cervix with friable ectropion   cervix dilated to accommodate size 8 curette  aspiration completed with manual uterine aspirator until gritty texture noted throughout  Brief uterine atony improved with bimanual massage and cessation of inhaled nitrous   Good hemostasis post op   Uterus appeared empty post op   Aspirate inspected consistent with villi, gestational sac and decidual tissue

## 2022-08-24 NOTE — ASU DISCHARGE PLAN (ADULT/PEDIATRIC) - NS MD DC FALL RISK RISK
For information on Fall & Injury Prevention, visit: [email or web address] OR  [email or web address] OR  https://www.cdc.gov/steadi/patient.html

## 2022-08-24 NOTE — ASU DISCHARGE PLAN (ADULT/PEDIATRIC) - CARE PROVIDER_API CALL
Sonja Guevara; MPH)  OBSGYN  Physicians  [address] [place] [address]  Phone: [phone]  Fax: [phone]  Follow Up Time:

## 2022-08-24 NOTE — ASU DISCHARGE PLAN (ADULT/PEDIATRIC) - COMMENTS
Please email [email or web address] with any questions

## 2022-08-25 LAB — SURGICAL PATHOLOGY STUDY: SIGNIFICANT CHANGE UP

## 2022-08-28 NOTE — HISTORY OF PRESENT ILLNESS
[FreeTextEntry1] : Patient presenting today before scheduled D&C for early pregnancy loss. She reports she is not accepting that pregnancy has passed and is requesting a repeat ultrasound today. She reports mild cramping, denies vaginal bleeding. She reports feeling "flutters" in her abdomen she thought may have been fetal movement and desires repeat US to confirm diagnosis.

## 2022-08-28 NOTE — PHYSICAL EXAM
[Chaperone Present] : A chaperone was present in the examining room during all aspects of the physical examination [FreeTextEntry1] : Vidhi BROWN MA  [Appropriately responsive] : appropriately responsive [Alert] : alert [No Acute Distress] : no acute distress [Soft] : soft [Non-tender] : non-tender [No Lesions] : no lesions [Oriented x3] : oriented x3

## 2022-08-28 NOTE — PLAN
[FreeTextEntry1] : TA/TVUS: +SIUP with CRL C/w 6+1, no cardiac activity \par \par A/P 38yo with early pregnancy loss presenting today for repeat evaluation pre operatively. \par \par Ultrasound performed today - TVUS performed for patient's viewing, findings discussed with patient who endorsed she was satisfied with the assessment and is now able to accept that she has experienced an early pregnancy loss \par \par Reviewed insulin instructions overnight \par \par Doxycycline sent to pharmacy \par \par STAT COVID test sent

## 2022-09-01 ENCOUNTER — APPOINTMENT (OUTPATIENT)
Dept: OBGYN | Facility: CLINIC | Age: 37
End: 2022-09-01

## 2022-09-06 ENCOUNTER — APPOINTMENT (OUTPATIENT)
Dept: OBGYN | Facility: CLINIC | Age: 37
End: 2022-09-06

## 2022-09-06 ENCOUNTER — OUTPATIENT (OUTPATIENT)
Dept: OUTPATIENT SERVICES | Facility: HOSPITAL | Age: 37
LOS: 1 days | Discharge: HOME | End: 2022-09-06

## 2022-09-06 ENCOUNTER — RESULT CHARGE (OUTPATIENT)
Age: 37
End: 2022-09-06

## 2022-09-06 VITALS
SYSTOLIC BLOOD PRESSURE: 120 MMHG | DIASTOLIC BLOOD PRESSURE: 64 MMHG | BODY MASS INDEX: 29.26 KG/M2 | HEART RATE: 71 BPM | WEIGHT: 160 LBS

## 2022-09-06 DIAGNOSIS — Z98.890 OTHER SPECIFIED POSTPROCEDURAL STATES: Chronic | ICD-10-CM

## 2022-09-06 LAB
HCG UR QL: NEGATIVE
QUALITY CONTROL: YES

## 2022-09-06 PROCEDURE — 99213 OFFICE O/P EST LOW 20 MIN: CPT

## 2022-09-06 NOTE — PLAN
[FreeTextEntry1] : A/P: 38yo s/p D&C for EPL, with insulin dependent diabetes, recovering well. \par \par Social work consult: Patient inquiring about parenting classes, bereavement referral \par \par Preconception counseling: Discussed recommendation to optimize chronic medical conditions before achieving pregnancy \par - Endocrine referral \par - Encouraged maintaining glucose log, diabetic diet \par \par UPT requested by patient today, UPT negative desires pregnancy\par \par RTC in 2 months or sooner as needed

## 2022-09-06 NOTE — HISTORY OF PRESENT ILLNESS
[FreeTextEntry1] : Patient presenting today for follow up after D&C for EPL. \par \par She is presenting today feeling well, she reports she is coping emotionally but desires additional support with bereavement group. She denies abdominal pain, denies bleeding, denies fevers or chills. \par \par She reports she has been sexually active without protection since the procedure. Reports increased sex drive, endorses desires pregnancy as soon as possible. Patient denies pain or issues with intercourse.

## 2022-09-07 DIAGNOSIS — Z79.84 LONG TERM (CURRENT) USE OF ORAL HYPOGLYCEMIC DRUGS: ICD-10-CM

## 2022-09-07 DIAGNOSIS — B37.9 CANDIDIASIS, UNSPECIFIED: ICD-10-CM

## 2022-09-07 DIAGNOSIS — O02.1 MISSED ABORTION: ICD-10-CM

## 2022-09-07 DIAGNOSIS — O24.111 PRE-EXISTING TYPE 2 DIABETES MELLITUS, IN PREGNANCY, FIRST TRIMESTER: ICD-10-CM

## 2022-09-16 ENCOUNTER — NON-APPOINTMENT (OUTPATIENT)
Age: 37
End: 2022-09-16

## 2022-09-19 ENCOUNTER — APPOINTMENT (OUTPATIENT)
Dept: ANTEPARTUM | Facility: CLINIC | Age: 37
End: 2022-09-19

## 2022-11-01 ENCOUNTER — APPOINTMENT (OUTPATIENT)
Dept: OBGYN | Facility: CLINIC | Age: 37
End: 2022-11-01

## 2022-11-07 NOTE — OB PROVIDER TRIAGE NOTE - HISTORY OF PRESENT ILLNESS
LOV 10- Kennedy 10- Neema as PCP    Appointments in Next Year    Dec 14, 2022  9:00 AM  LAB with [facility]  [facility] ([facility] ) [phone]   Dec 15, 2022  3:45 PM  (Arrive by 3:40 PM)  Return Cardiology with Luis Moffett MD  [facility] ([facility] ) [phone]   Dec 22, 2022  9:30 AM  (Arrive by 9:10 AM)  MEDICARE ANNUAL WELLNESS VISIT with Corona Bethea MD  [facility] ([facility] ) [phone]          RT Chris (R)     35yo  at 36w2d by LMP 10/24/18 consistent with first trimester sono presents with contractions starting at 1600, q2-4 mins, 7/10 in severity. Denies LOF and VB. Reports good fetal movement. Patient has PMH of type 2 DM, on Novolog and NPH. GBS positive. 35yo  at 36w2d by LMP 10/24/18 consistent with first trimester sono presents with contractions starting at 1600, q2-4 mins, 7/10 in severity. Denies LOF and VB. Reports good fetal movement. Patient has PMH of T2DM, on Novolog  and NPH . GBS positive. 33yo  at 36w2d by LMP 10/24/18 consistent with first trimester sono presents with contractions starting at 1600, q2-4 mins, 7/10 in severity. Denies LOF and VB. Reports good fetal movement. Patient has PMH of T2DM, on Novolog  and NPH . Fasting glucose typically 100, postprandial 142-180. Fingerstick on admission 172. GBS positive.

## 2022-11-14 ENCOUNTER — NON-APPOINTMENT (OUTPATIENT)
Age: 37
End: 2022-11-14

## 2023-02-06 ENCOUNTER — NON-APPOINTMENT (OUTPATIENT)
Age: 38
End: 2023-02-06

## 2023-02-17 ENCOUNTER — EMERGENCY (EMERGENCY)
Facility: HOSPITAL | Age: 38
LOS: 0 days | Discharge: HOME | End: 2023-02-18
Attending: EMERGENCY MEDICINE
Payer: MEDICAID

## 2023-02-17 VITALS
TEMPERATURE: 98 F | OXYGEN SATURATION: 100 % | SYSTOLIC BLOOD PRESSURE: 147 MMHG | HEIGHT: 61 IN | RESPIRATION RATE: 18 BRPM | DIASTOLIC BLOOD PRESSURE: 69 MMHG | WEIGHT: 158.07 LBS | HEART RATE: 102 BPM

## 2023-02-17 DIAGNOSIS — Z98.890 OTHER SPECIFIED POSTPROCEDURAL STATES: Chronic | ICD-10-CM

## 2023-02-17 DIAGNOSIS — E11.65 TYPE 2 DIABETES MELLITUS WITH HYPERGLYCEMIA: ICD-10-CM

## 2023-02-17 DIAGNOSIS — N93.9 ABNORMAL UTERINE AND VAGINAL BLEEDING, UNSPECIFIED: ICD-10-CM

## 2023-02-17 DIAGNOSIS — Z79.4 LONG TERM (CURRENT) USE OF INSULIN: ICD-10-CM

## 2023-02-17 DIAGNOSIS — R11.0 NAUSEA: ICD-10-CM

## 2023-02-17 DIAGNOSIS — R10.30 LOWER ABDOMINAL PAIN, UNSPECIFIED: ICD-10-CM

## 2023-02-17 PROCEDURE — 82010 KETONE BODYS QUAN: CPT

## 2023-02-17 PROCEDURE — 81003 URINALYSIS AUTO W/O SCOPE: CPT

## 2023-02-17 PROCEDURE — 99284 EMERGENCY DEPT VISIT MOD MDM: CPT

## 2023-02-17 PROCEDURE — 76830 TRANSVAGINAL US NON-OB: CPT

## 2023-02-17 PROCEDURE — 84703 CHORIONIC GONADOTROPIN ASSAY: CPT

## 2023-02-17 PROCEDURE — 36415 COLL VENOUS BLD VENIPUNCTURE: CPT

## 2023-02-17 PROCEDURE — 80048 BASIC METABOLIC PNL TOTAL CA: CPT

## 2023-02-17 PROCEDURE — 80076 HEPATIC FUNCTION PANEL: CPT

## 2023-02-17 PROCEDURE — 99284 EMERGENCY DEPT VISIT MOD MDM: CPT | Mod: 25

## 2023-02-17 PROCEDURE — 83690 ASSAY OF LIPASE: CPT

## 2023-02-17 PROCEDURE — 85025 COMPLETE CBC W/AUTO DIFF WBC: CPT

## 2023-02-17 NOTE — ED ADULT TRIAGE NOTE - BMI (KG/M2)
29.9 Fluconazole Pregnancy And Lactation Text: This medication is Pregnancy Category C and it isn't know if it is safe during pregnancy. It is also excreted in breast milk.

## 2023-02-17 NOTE — ED ADULT TRIAGE NOTE - CHIEF COMPLAINT QUOTE
I want to take a pregnancy test - patient  Male with patient also requesting "the pill" for . Patient also c/o abdominal cramping and feeling tired. LMP

## 2023-02-18 VITALS
HEART RATE: 87 BPM | OXYGEN SATURATION: 98 % | DIASTOLIC BLOOD PRESSURE: 60 MMHG | TEMPERATURE: 98 F | SYSTOLIC BLOOD PRESSURE: 113 MMHG

## 2023-02-18 LAB
ALBUMIN SERPL ELPH-MCNC: 3.5 G/DL — SIGNIFICANT CHANGE UP (ref 3.5–5.2)
ALP SERPL-CCNC: 91 U/L — SIGNIFICANT CHANGE UP (ref 30–115)
ALT FLD-CCNC: 13 U/L — SIGNIFICANT CHANGE UP (ref 0–41)
ANION GAP SERPL CALC-SCNC: 9 MMOL/L — SIGNIFICANT CHANGE UP (ref 7–14)
APPEARANCE UR: CLEAR — SIGNIFICANT CHANGE UP
AST SERPL-CCNC: 16 U/L — SIGNIFICANT CHANGE UP (ref 0–41)
B-OH-BUTYR SERPL-SCNC: 1 MMOL/L — HIGH
BASOPHILS # BLD AUTO: 0.01 K/UL — SIGNIFICANT CHANGE UP (ref 0–0.2)
BASOPHILS NFR BLD AUTO: 0.2 % — SIGNIFICANT CHANGE UP (ref 0–1)
BILIRUB DIRECT SERPL-MCNC: <0.2 MG/DL — SIGNIFICANT CHANGE UP (ref 0–0.3)
BILIRUB INDIRECT FLD-MCNC: SIGNIFICANT CHANGE UP MG/DL (ref 0.2–1.2)
BILIRUB SERPL-MCNC: <0.2 MG/DL — SIGNIFICANT CHANGE UP (ref 0.2–1.2)
BILIRUB UR-MCNC: NEGATIVE — SIGNIFICANT CHANGE UP
BUN SERPL-MCNC: 20 MG/DL — SIGNIFICANT CHANGE UP (ref 10–20)
CALCIUM SERPL-MCNC: 9.1 MG/DL — SIGNIFICANT CHANGE UP (ref 8.4–10.5)
CHLORIDE SERPL-SCNC: 102 MMOL/L — SIGNIFICANT CHANGE UP (ref 98–110)
CO2 SERPL-SCNC: 25 MMOL/L — SIGNIFICANT CHANGE UP (ref 17–32)
COLOR SPEC: SIGNIFICANT CHANGE UP
CREAT SERPL-MCNC: 0.8 MG/DL — SIGNIFICANT CHANGE UP (ref 0.7–1.5)
DIFF PNL FLD: NEGATIVE — SIGNIFICANT CHANGE UP
EGFR: 96 ML/MIN/1.73M2 — SIGNIFICANT CHANGE UP
EOSINOPHIL # BLD AUTO: 0.02 K/UL — SIGNIFICANT CHANGE UP (ref 0–0.7)
EOSINOPHIL NFR BLD AUTO: 0.3 % — SIGNIFICANT CHANGE UP (ref 0–8)
GLUCOSE SERPL-MCNC: 408 MG/DL — HIGH (ref 70–99)
GLUCOSE UR QL: ABNORMAL
HCG SERPL QL: NEGATIVE — SIGNIFICANT CHANGE UP
HCT VFR BLD CALC: 37.4 % — SIGNIFICANT CHANGE UP (ref 37–47)
HGB BLD-MCNC: 12.3 G/DL — SIGNIFICANT CHANGE UP (ref 12–16)
IMM GRANULOCYTES NFR BLD AUTO: 0.5 % — HIGH (ref 0.1–0.3)
KETONES UR-MCNC: ABNORMAL
LEUKOCYTE ESTERASE UR-ACNC: NEGATIVE — SIGNIFICANT CHANGE UP
LIDOCAIN IGE QN: 52 U/L — SIGNIFICANT CHANGE UP (ref 7–60)
LYMPHOCYTES # BLD AUTO: 2.24 K/UL — SIGNIFICANT CHANGE UP (ref 1.2–3.4)
LYMPHOCYTES # BLD AUTO: 36.7 % — SIGNIFICANT CHANGE UP (ref 20.5–51.1)
MCHC RBC-ENTMCNC: 25.7 PG — LOW (ref 27–31)
MCHC RBC-ENTMCNC: 32.9 G/DL — SIGNIFICANT CHANGE UP (ref 32–37)
MCV RBC AUTO: 78.2 FL — LOW (ref 81–99)
MONOCYTES # BLD AUTO: 0.37 K/UL — SIGNIFICANT CHANGE UP (ref 0.1–0.6)
MONOCYTES NFR BLD AUTO: 6.1 % — SIGNIFICANT CHANGE UP (ref 1.7–9.3)
NEUTROPHILS # BLD AUTO: 3.43 K/UL — SIGNIFICANT CHANGE UP (ref 1.4–6.5)
NEUTROPHILS NFR BLD AUTO: 56.2 % — SIGNIFICANT CHANGE UP (ref 42.2–75.2)
NITRITE UR-MCNC: NEGATIVE — SIGNIFICANT CHANGE UP
NRBC # BLD: 0 /100 WBCS — SIGNIFICANT CHANGE UP (ref 0–0)
PH UR: 7 — SIGNIFICANT CHANGE UP (ref 5–8)
PLATELET # BLD AUTO: 212 K/UL — SIGNIFICANT CHANGE UP (ref 130–400)
POTASSIUM SERPL-MCNC: 4.6 MMOL/L — SIGNIFICANT CHANGE UP (ref 3.5–5)
POTASSIUM SERPL-SCNC: 4.6 MMOL/L — SIGNIFICANT CHANGE UP (ref 3.5–5)
PROT SERPL-MCNC: 5.9 G/DL — LOW (ref 6–8)
PROT UR-MCNC: SIGNIFICANT CHANGE UP
RBC # BLD: 4.78 M/UL — SIGNIFICANT CHANGE UP (ref 4.2–5.4)
RBC # FLD: 12.6 % — SIGNIFICANT CHANGE UP (ref 11.5–14.5)
SODIUM SERPL-SCNC: 136 MMOL/L — SIGNIFICANT CHANGE UP (ref 135–146)
SP GR SPEC: 1.02 — SIGNIFICANT CHANGE UP (ref 1.01–1.03)
UROBILINOGEN FLD QL: SIGNIFICANT CHANGE UP
WBC # BLD: 6.1 K/UL — SIGNIFICANT CHANGE UP (ref 4.8–10.8)
WBC # FLD AUTO: 6.1 K/UL — SIGNIFICANT CHANGE UP (ref 4.8–10.8)

## 2023-02-18 PROCEDURE — 76830 TRANSVAGINAL US NON-OB: CPT | Mod: 26

## 2023-02-18 RX ORDER — SODIUM CHLORIDE 9 MG/ML
1000 INJECTION, SOLUTION INTRAVENOUS ONCE
Refills: 0 | Status: COMPLETED | OUTPATIENT
Start: 2023-02-18 | End: 2023-02-18

## 2023-02-18 RX ADMIN — SODIUM CHLORIDE 1000 MILLILITER(S): 9 INJECTION, SOLUTION INTRAVENOUS at 02:21

## 2023-02-18 NOTE — ED PROVIDER NOTE - NSPTACCESSSVCSAPPTDETAILS_ED_ALL_ED_FT
Our Emergency Department Referral Coordinators will be reaching out to you in the next 24-48 hours from 9:00am to 5:00pm with a follow up appointment. Please expect a phone call from the hospital in that time frame. If you do not receive a call or if you have any questions or concerns, you can reach them at   [phone]

## 2023-02-18 NOTE — ED PROVIDER NOTE - CLINICAL SUMMARY MEDICAL DECISION MAKING FREE TEXT BOX
Laboratory results reviewed and discussed with patient. CBC shows normal WBC count, Hb/Hct and plateelet count are WNL. BMP shows glucose of 408, and remaining electrolytes WNL and no GRAHAM. Liver Function test and lipase are WNL.   Pregnant test is negative. Urine analysis shows no significant findings to suggest Acute UTI.   patient remained stable in ED, improved well, results of the diagnostic studies reviewed and discussed with patient, Patient remained awake, alert, ambulatory and comfortable, tolerated PO. Discussed with patient in detail about the need for close outpatient follow up and the need to return to ED for any persistent, or worsening symptoms, for any new symptoms/concerns. patient verbalized understanding and agreed. patient is given detail aftercare instructions and is instructed well to f/u as outpatient for further care.

## 2023-02-18 NOTE — ED PROVIDER NOTE - DIFFERENTIAL DIAGNOSIS
Ectopic pregnancy, spontaneous Ab, anemia, electrolyte abnormalities, DKA, GRAHAM, dehydration, UTI, Ovarian cyst, Ovarian torsion. Differential Diagnosis

## 2023-02-18 NOTE — ED PROVIDER NOTE - PROGRESS NOTE DETAILS
Discussed with patient about the need for pelvic exam to further evaluate for the causes her vaginal spotting and lower abd cramping. Patient verbalized understanding the need for pelvic exam and declined. Patient was encouraged about the importance of the exam and the benefits of the exam were discussed with patient as well and she verbalized understanding the information provided and declined the exam. Patient wanted to follow up with GYN doctor for further evaluation of her symptoms. Patient also stated that, she has DM for long time and is on insulin and did not take it, and does not want have further evaluations or treatments for her hyperglycemia. Patient wanted to go home and follow up as outpatient. Discussed with patient in detail about the need for close outpatient follow up and the need to return to ED immediately for any persistent or worsening symptoms, or for any new symptoms/concerns. patient verbalized understanding and agreed. Patient states that, her abd pain is gone and feels well and wanted to go home, detail aftercare instructions and return precautions discussed with patient and she verbalized understanding and agreed.

## 2023-02-18 NOTE — ED PROVIDER NOTE - NSFOLLOWUPCLINICS_GEN_ALL_ED_FT
[facility]  Medicine  [address], [place]   Phone: [phone]  Fax:   Follow Up Time: Urgent    [facility]  OB/GYN  [address] [place] [address]  Phone: [phone]  Fax:   Follow Up Time: Urgent

## 2023-02-18 NOTE — ED PROVIDER NOTE - OBJECTIVE STATEMENT
Patient states that, she had her menstrual cycle in Patient states that, she had her menstrual cycle in the first week of february, and again started having vaginal spotting yesterday, and also felt nauseous, so is concerned about the pregnancy, and had come to ED for evaluation. Patient denies cp/sob/n/v/f/c; +intermittent lower abd cramping, denies vaginal discharge, denies urinary symptoms. Denies dizziness/lightheadedness/syncope/presyncope. Denies URI symptoms. Patient stated that, she was feeling tired today, but otherwise denies any other symptoms. Patient also clearly stated that, she has diabetes and is on insulin, did not take it today, and also stated that, she is not here for diabetes, but wanted to have pregnancy evaluation done. Patient agreed with blood work and IVF and evaluation for DKA due to feeling tired and nauseous. Patient denies feeling depressed, denies SI/HI, denies Domestic violence and she feels safe at home.

## 2023-02-18 NOTE — ED PROVIDER NOTE - PATIENT PORTAL LINK FT
You can access the FollowMyHealth Patient Portal offered by [facility] by registering at the following website: [email or web address]. By joining [employer]s FollowMyHealth portal, you will also be able to view your health information using other applications (apps) compatible with our system.

## 2023-02-18 NOTE — ED PROVIDER NOTE - NSFOLLOWUPINSTRUCTIONS_ED_ALL_ED_FT
Hyperglycemia    Hyperglycemia occurs when the glucose (a sugar) level in your blood is too high. Symptoms include increased urination, increased thirst, a dry mouth, or changes in appetite. If started on a medication, take exactly as prescribed by your health care professional. Maintain a healthy lifestyle and follow up with your primary care physician.    SEEK IMMEDIATE MEDICAL CARE IF YOU HAVE THE FOLLOWING SYMPTOMS: shortness of breath, change in mental status, nausea or vomiting, fruity smell to your breath, or any signs of dehydration.    Pelvic Pain, Female    Female pelvic pain can be caused by many different things and start from a variety of places. Pelvic pain refers to pain that is located in the lower half of the abdomen and between your hips. The pain may occur over a short period of time (acute) or may be reoccurring (chronic). The cause of pelvic pain may be related to disorders affecting the female reproductive organs (gynecologic), but it may also be related to the bladder, kidney stones, an intestinal complication, or muscle or skeletal problems. Getting help right away for pelvic pain is important, especially if there has been severe, sharp, or a sudden onset of unusual pain. It is also important to get help right away because some types of pelvic pain can be life threatening.     CAUSES  Below are only some of the causes of pelvic pain. The causes of pelvic pain can be in one of several categories.     Gynecologic.   Pelvic inflammatory disease.  Sexually transmitted infection.  Ovarian cyst or a twisted ovarian ligament (ovarian torsion).  Uterine lining that grows outside the uterus (endometriosis).  Fibroids, cysts, or tumors.  Ovulation.   Pregnancy.  Pregnancy that occurs outside the uterus (ectopic pregnancy).  Miscarriage.   Labor.  Abruption of the placenta or ruptured uterus.  Infection.  Uterine infection (endometritis).  Bladder infection.  Diverticulitis.  Miscarriage related to a uterine infection (septic ).  Bladder.  Inflammation of the bladder (cystitis).  Kidney stone(s).  Gastrointestinal.  Constipation.  Diverticulitis.  Neurologic.  Trauma.  Feeling pelvic pain because of mental or emotional causes (psychosomatic).   Cancers of the bowel or pelvis.     EVALUATION  Your caregiver will want to take a careful history of your concerns. This includes recent changes in your health, a careful gynecologic history of your periods (menses), and a sexual history. Obtaining your family history and medical history is also important. Your caregiver may suggest a pelvic exam. A pelvic exam will help identify the location and severity of the pain. It also helps in the evaluation of which organ system may be involved. In order to identify the cause of the pelvic pain and be properly treated, your caregiver may order tests. These tests may include:     A pregnancy test.  Pelvic ultrasonography.  An X-ray exam of the abdomen.  A urinalysis or evaluation of vaginal discharge.  Blood tests.     HOME CARE INSTRUCTIONS  Only take over-the-counter or prescription medicines for pain, discomfort, or fever as directed by your caregiver.    Rest as directed by your caregiver.    Eat a balanced diet.    Drink enough fluids to make your urine clear or pale yellow, or as directed.    Avoid sexual intercourse if it causes pain.    Apply warm or cold compresses to the lower abdomen depending on which one helps the pain.    Avoid stressful situations.    Keep a journal of your pelvic pain. Write down when it started, where the pain is located, and if there are things that seem to be associated with the pain, such as food or your menstrual cycle.  Follow up with your caregiver as directed.       SEEK MEDICAL CARE IF:  Your medicine does not help your pain.  You have abnormal vaginal discharge.     SEEK IMMEDIATE MEDICAL CARE IF:  You have heavy bleeding from the vagina.    Your pelvic pain increases.    You feel light-headed or faint.    You have chills.    You have pain with urination or blood in your urine.    You have uncontrolled diarrhea or vomiting.    You have a fever or persistent symptoms for more than 3 days.  You have a fever and your symptoms suddenly get worse.    You are being physically or sexually abused.

## 2023-07-13 ENCOUNTER — OUTPATIENT (OUTPATIENT)
Dept: OUTPATIENT SERVICES | Facility: HOSPITAL | Age: 38
LOS: 1 days | End: 2023-07-13
Payer: MEDICAID

## 2023-07-13 ENCOUNTER — LABORATORY RESULT (OUTPATIENT)
Age: 38
End: 2023-07-13

## 2023-07-13 DIAGNOSIS — Z34.90 ENCOUNTER FOR SUPERVISION OF NORMAL PREGNANCY, UNSPECIFIED, UNSPECIFIED TRIMESTER: ICD-10-CM

## 2023-07-13 DIAGNOSIS — Z98.890 OTHER SPECIFIED POSTPROCEDURAL STATES: Chronic | ICD-10-CM

## 2023-07-13 PROCEDURE — 87389 HIV-1 AG W/HIV-1&-2 AB AG IA: CPT

## 2023-07-13 PROCEDURE — 87491 CHLMYD TRACH DNA AMP PROBE: CPT

## 2023-07-13 PROCEDURE — 87340 HEPATITIS B SURFACE AG IA: CPT

## 2023-07-13 PROCEDURE — 86780 TREPONEMA PALLIDUM: CPT

## 2023-07-13 PROCEDURE — 36415 COLL VENOUS BLD VENIPUNCTURE: CPT

## 2023-07-13 PROCEDURE — 87591 N.GONORRHOEAE DNA AMP PROB: CPT

## 2023-07-13 PROCEDURE — 87661 TRICHOMONAS VAGINALIS AMPLIF: CPT

## 2023-07-13 PROCEDURE — 87798 DETECT AGENT NOS DNA AMP: CPT

## 2023-07-13 PROCEDURE — 86701 HIV-1ANTIBODY: CPT

## 2023-07-13 PROCEDURE — 84702 CHORIONIC GONADOTROPIN TEST: CPT

## 2023-07-13 PROCEDURE — 87801 DETECT AGNT MULT DNA AMPLI: CPT

## 2023-07-14 ENCOUNTER — NON-APPOINTMENT (OUTPATIENT)
Age: 38
End: 2023-07-14

## 2023-07-14 LAB
HBV SURFACE AG SER QL: NONREACTIVE
HCG SERPL-MCNC: 232.7 MIU/ML
T PALLIDUM AB SER QL IA: NEGATIVE

## 2023-07-17 ENCOUNTER — OUTPATIENT (OUTPATIENT)
Dept: OUTPATIENT SERVICES | Facility: HOSPITAL | Age: 38
LOS: 1 days | End: 2023-07-17
Payer: MEDICAID

## 2023-07-17 DIAGNOSIS — Z98.890 OTHER SPECIFIED POSTPROCEDURAL STATES: Chronic | ICD-10-CM

## 2023-07-17 PROCEDURE — 84702 CHORIONIC GONADOTROPIN TEST: CPT

## 2023-07-17 PROCEDURE — 83036 HEMOGLOBIN GLYCOSYLATED A1C: CPT

## 2023-07-18 ENCOUNTER — NON-APPOINTMENT (OUTPATIENT)
Age: 38
End: 2023-07-18

## 2023-07-18 LAB
ESTIMATED AVERAGE GLUCOSE: >398 MG/DL
HBA1C MFR BLD HPLC: >15.5 %
HCG SERPL-MCNC: 264.3 MIU/ML

## 2023-07-20 ENCOUNTER — APPOINTMENT (OUTPATIENT)
Dept: ANTEPARTUM | Facility: CLINIC | Age: 38
End: 2023-07-20
Payer: MEDICAID

## 2023-07-20 ENCOUNTER — ASOB RESULT (OUTPATIENT)
Age: 38
End: 2023-07-20

## 2023-07-20 ENCOUNTER — APPOINTMENT (OUTPATIENT)
Dept: OBGYN | Facility: CLINIC | Age: 38
End: 2023-07-20
Payer: MEDICAID

## 2023-07-20 ENCOUNTER — OUTPATIENT (OUTPATIENT)
Dept: OUTPATIENT SERVICES | Facility: HOSPITAL | Age: 38
LOS: 1 days | End: 2023-07-20
Payer: MEDICAID

## 2023-07-20 DIAGNOSIS — Z98.890 OTHER SPECIFIED POSTPROCEDURAL STATES: Chronic | ICD-10-CM

## 2023-07-20 DIAGNOSIS — O36.80X0 PREGNANCY WITH INCONCLUSIVE FETAL VIABILITY, NOT APPLICABLE OR UNSPECIFIED: ICD-10-CM

## 2023-07-20 PROCEDURE — 99213 OFFICE O/P EST LOW 20 MIN: CPT

## 2023-07-20 PROCEDURE — 84702 CHORIONIC GONADOTROPIN TEST: CPT

## 2023-07-20 PROCEDURE — 99213 OFFICE O/P EST LOW 20 MIN: CPT | Mod: TH

## 2023-07-20 PROCEDURE — 76817 TRANSVAGINAL US OBSTETRIC: CPT | Mod: 26

## 2023-07-20 PROCEDURE — 99212 OFFICE O/P EST SF 10 MIN: CPT | Mod: TH,25

## 2023-07-20 PROCEDURE — 99212 OFFICE O/P EST SF 10 MIN: CPT

## 2023-07-20 PROCEDURE — 36415 COLL VENOUS BLD VENIPUNCTURE: CPT

## 2023-07-20 PROCEDURE — 76817 TRANSVAGINAL US OBSTETRIC: CPT

## 2023-07-20 NOTE — DISCUSSION/SUMMARY
[FreeTextEntry1] : 7/20/23\par [facility] US Att'g Note: \par Ms Arora is a 38y  with IDDM, A1C = 15.5% and Hx of early pregnancy loss in 2022.  LMP 6/4/23, 6w4d ga.  She  is referred by Dr Guevara for +pregnancy test, abnormally rising betaHCG and pregnancy of unknown location.  \par She states she is feeling well, and denies LAP. \par 1.  The uterus is normal sized.  No intrauterine gestational sac can be imaged.\par 2.  The right and left ovary are identified and appear normal.  A right ovarian cyst c/w with corpus luteum is seen, as detailed above.  \par I discussed these findings with Ms Arora and answered her questions about pregnancy of unknown location. \par \par Findings reviewed  with Dr Guevara who will follow up with pt. \par \par MD Jacky, FACOG\par

## 2023-07-21 DIAGNOSIS — Z3A.01 LESS THAN 8 WEEKS GESTATION OF PREGNANCY: ICD-10-CM

## 2023-07-21 DIAGNOSIS — O24.311 UNSPECIFIED PRE-EXISTING DIABETES MELLITUS IN PREGNANCY, FIRST TRIMESTER: ICD-10-CM

## 2023-07-21 DIAGNOSIS — Z00.00 ENCOUNTER FOR GENERAL ADULT MEDICAL EXAMINATION W/OUT ABNORMAL FINDINGS: ICD-10-CM

## 2023-07-21 DIAGNOSIS — O09.519 SUPERVISION OF ELDERLY PRIMIGRAVIDA, UNSPECIFIED TRIMESTER: ICD-10-CM

## 2023-07-21 DIAGNOSIS — O36.80X0 PREGNANCY WITH INCONCLUSIVE FETAL VIABILITY, NOT APPLICABLE OR UNSPECIFIED: ICD-10-CM

## 2023-07-21 LAB
A VAGINAE DNA VAG QL NAA+PROBE: ABNORMAL
BVAB2 DNA VAG QL NAA+PROBE: ABNORMAL
C KRUSEI DNA VAG QL NAA+PROBE: NEGATIVE
C KRUSEI DNA VAG QL NAA+PROBE: NEGATIVE
C KRUSEI DNA VAG QL NAA+PROBE: POSITIVE
C KRUSEI DNA VAG QL NAA+PROBE: POSITIVE
C TRACH RRNA SPEC QL NAA+PROBE: NEGATIVE
CANDIDA DNA VAG QL NAA+PROBE: NEGATIVE
HCG SERPL-MCNC: 332.9 MIU/ML
MEGA1 DNA VAG QL NAA+PROBE: ABNORMAL
N GONORRHOEA RRNA SPEC QL NAA+PROBE: NEGATIVE
T VAGINALIS RRNA SPEC QL NAA+PROBE: NEGATIVE

## 2023-07-21 RX ORDER — FLUCONAZOLE 150 MG/1
150 TABLET ORAL
Qty: 2 | Refills: 0 | Status: ACTIVE | COMMUNITY
Start: 2023-07-21 | End: 1900-01-01

## 2023-07-21 RX ORDER — METRONIDAZOLE 500 MG/1
500 TABLET ORAL TWICE DAILY
Qty: 14 | Refills: 0 | Status: ACTIVE | COMMUNITY
Start: 2023-07-21 | End: 1900-01-01

## 2023-07-23 ENCOUNTER — INPATIENT (INPATIENT)
Facility: HOSPITAL | Age: 38
LOS: 2 days | Discharge: HOME | DRG: 566 | End: 2023-07-26
Attending: OBSTETRICS & GYNECOLOGY | Admitting: OBSTETRICS & GYNECOLOGY
Payer: MEDICAID

## 2023-07-23 VITALS
DIASTOLIC BLOOD PRESSURE: 64 MMHG | RESPIRATION RATE: 17 BRPM | SYSTOLIC BLOOD PRESSURE: 106 MMHG | HEIGHT: 61 IN | HEART RATE: 102 BPM | TEMPERATURE: 97 F | WEIGHT: 158.95 LBS

## 2023-07-23 DIAGNOSIS — Z98.890 OTHER SPECIFIED POSTPROCEDURAL STATES: Chronic | ICD-10-CM

## 2023-07-23 DIAGNOSIS — O00.90 UNSPECIFIED ECTOPIC PREGNANCY WITHOUT INTRAUTERINE PREGNANCY: ICD-10-CM

## 2023-07-23 LAB
ALBUMIN SERPL ELPH-MCNC: 4.2 G/DL — SIGNIFICANT CHANGE UP (ref 3.5–5.2)
ALP SERPL-CCNC: 129 U/L — HIGH (ref 30–115)
ALT FLD-CCNC: 13 U/L — SIGNIFICANT CHANGE UP (ref 0–41)
ANION GAP SERPL CALC-SCNC: 13 MMOL/L — SIGNIFICANT CHANGE UP (ref 7–14)
APPEARANCE UR: CLEAR — SIGNIFICANT CHANGE UP
AST SERPL-CCNC: 11 U/L — SIGNIFICANT CHANGE UP (ref 0–41)
BILIRUB SERPL-MCNC: 0.2 MG/DL — SIGNIFICANT CHANGE UP (ref 0.2–1.2)
BILIRUB UR-MCNC: NEGATIVE — SIGNIFICANT CHANGE UP
BUN SERPL-MCNC: 12 MG/DL — SIGNIFICANT CHANGE UP (ref 10–20)
CALCIUM SERPL-MCNC: 9.1 MG/DL — SIGNIFICANT CHANGE UP (ref 8.4–10.4)
CHLORIDE SERPL-SCNC: 100 MMOL/L — SIGNIFICANT CHANGE UP (ref 98–110)
CO2 SERPL-SCNC: 21 MMOL/L — SIGNIFICANT CHANGE UP (ref 17–32)
COLOR SPEC: SIGNIFICANT CHANGE UP
CREAT SERPL-MCNC: 0.9 MG/DL — SIGNIFICANT CHANGE UP (ref 0.7–1.5)
DIFF PNL FLD: ABNORMAL
EGFR: 83 ML/MIN/1.73M2 — SIGNIFICANT CHANGE UP
GLUCOSE SERPL-MCNC: 492 MG/DL — CRITICAL HIGH (ref 70–99)
GLUCOSE UR QL: ABNORMAL
HCG SERPL-ACNC: 403 MIU/ML — HIGH
HCT VFR BLD CALC: 37.5 % — SIGNIFICANT CHANGE UP (ref 37–47)
HGB BLD-MCNC: 12.7 G/DL — SIGNIFICANT CHANGE UP (ref 12–16)
KETONES UR-MCNC: ABNORMAL
LEUKOCYTE ESTERASE UR-ACNC: ABNORMAL
MCHC RBC-ENTMCNC: 26.7 PG — LOW (ref 27–31)
MCHC RBC-ENTMCNC: 33.9 G/DL — SIGNIFICANT CHANGE UP (ref 32–37)
MCV RBC AUTO: 78.8 FL — LOW (ref 81–99)
NITRITE UR-MCNC: NEGATIVE — SIGNIFICANT CHANGE UP
NRBC # BLD: 0 /100 WBCS — SIGNIFICANT CHANGE UP (ref 0–0)
PH UR: 6 — SIGNIFICANT CHANGE UP (ref 5–8)
PLATELET # BLD AUTO: 214 K/UL — SIGNIFICANT CHANGE UP (ref 130–400)
PMV BLD: 11.9 FL — HIGH (ref 7.4–10.4)
POTASSIUM SERPL-MCNC: 4.1 MMOL/L — SIGNIFICANT CHANGE UP (ref 3.5–5)
POTASSIUM SERPL-SCNC: 4.1 MMOL/L — SIGNIFICANT CHANGE UP (ref 3.5–5)
PROT SERPL-MCNC: 6.7 G/DL — SIGNIFICANT CHANGE UP (ref 6–8)
PROT UR-MCNC: NEGATIVE — SIGNIFICANT CHANGE UP
RBC # BLD: 4.76 M/UL — SIGNIFICANT CHANGE UP (ref 4.2–5.4)
RBC # FLD: 13.1 % — SIGNIFICANT CHANGE UP (ref 11.5–14.5)
SODIUM SERPL-SCNC: 134 MMOL/L — LOW (ref 135–146)
SP GR SPEC: 1.03 — HIGH (ref 1.01–1.03)
UROBILINOGEN FLD QL: SIGNIFICANT CHANGE UP
WBC # BLD: 5.67 K/UL — SIGNIFICANT CHANGE UP (ref 4.8–10.8)
WBC # FLD AUTO: 5.67 K/UL — SIGNIFICANT CHANGE UP (ref 4.8–10.8)

## 2023-07-23 PROCEDURE — 76817 TRANSVAGINAL US OBSTETRIC: CPT | Mod: 26,59

## 2023-07-23 PROCEDURE — 85025 COMPLETE CBC W/AUTO DIFF WBC: CPT

## 2023-07-23 PROCEDURE — 36415 COLL VENOUS BLD VENIPUNCTURE: CPT

## 2023-07-23 PROCEDURE — 80053 COMPREHEN METABOLIC PANEL: CPT

## 2023-07-23 PROCEDURE — 82962 GLUCOSE BLOOD TEST: CPT

## 2023-07-23 PROCEDURE — 93005 ELECTROCARDIOGRAM TRACING: CPT

## 2023-07-23 PROCEDURE — 84681 ASSAY OF C-PEPTIDE: CPT

## 2023-07-23 PROCEDURE — 87086 URINE CULTURE/COLONY COUNT: CPT

## 2023-07-23 PROCEDURE — 83605 ASSAY OF LACTIC ACID: CPT

## 2023-07-23 PROCEDURE — 84100 ASSAY OF PHOSPHORUS: CPT

## 2023-07-23 PROCEDURE — 83735 ASSAY OF MAGNESIUM: CPT

## 2023-07-23 PROCEDURE — 82010 KETONE BODYS QUAN: CPT

## 2023-07-23 PROCEDURE — 99285 EMERGENCY DEPT VISIT HI MDM: CPT

## 2023-07-23 PROCEDURE — 83036 HEMOGLOBIN GLYCOSYLATED A1C: CPT

## 2023-07-23 PROCEDURE — 76801 OB US < 14 WKS SINGLE FETUS: CPT | Mod: 26

## 2023-07-23 PROCEDURE — 86337 INSULIN ANTIBODIES: CPT

## 2023-07-23 PROCEDURE — 86341 ISLET CELL ANTIBODY: CPT

## 2023-07-23 PROCEDURE — 99223 1ST HOSP IP/OBS HIGH 75: CPT

## 2023-07-23 PROCEDURE — 80048 BASIC METABOLIC PNL TOTAL CA: CPT

## 2023-07-23 RX ORDER — DEXTROSE 50 % IN WATER 50 %
12.5 SYRINGE (ML) INTRAVENOUS ONCE
Refills: 0 | Status: DISCONTINUED | OUTPATIENT
Start: 2023-07-23 | End: 2023-07-26

## 2023-07-23 RX ORDER — SODIUM CHLORIDE 9 MG/ML
1000 INJECTION INTRAMUSCULAR; INTRAVENOUS; SUBCUTANEOUS ONCE
Refills: 0 | Status: COMPLETED | OUTPATIENT
Start: 2023-07-23 | End: 2023-07-23

## 2023-07-23 RX ORDER — HUMAN INSULIN 100 [IU]/ML
0 INJECTION, SUSPENSION SUBCUTANEOUS
Qty: 0 | Refills: 0 | DISCHARGE

## 2023-07-23 RX ORDER — METHOTREXATE 2.5 MG/1
88 TABLET ORAL ONCE
Refills: 0 | Status: COMPLETED | OUTPATIENT
Start: 2023-07-23 | End: 2023-07-23

## 2023-07-23 RX ORDER — INSULIN LISPRO 100/ML
VIAL (ML) SUBCUTANEOUS AT BEDTIME
Refills: 0 | Status: DISCONTINUED | OUTPATIENT
Start: 2023-07-23 | End: 2023-07-26

## 2023-07-23 RX ORDER — SODIUM CHLORIDE 9 MG/ML
1000 INJECTION, SOLUTION INTRAVENOUS
Refills: 0 | Status: DISCONTINUED | OUTPATIENT
Start: 2023-07-23 | End: 2023-07-26

## 2023-07-23 RX ORDER — INSULIN LISPRO 100/ML
VIAL (ML) SUBCUTANEOUS
Refills: 0 | Status: DISCONTINUED | OUTPATIENT
Start: 2023-07-23 | End: 2023-07-24

## 2023-07-23 RX ORDER — DEXTROSE 50 % IN WATER 50 %
25 SYRINGE (ML) INTRAVENOUS ONCE
Refills: 0 | Status: DISCONTINUED | OUTPATIENT
Start: 2023-07-23 | End: 2023-07-26

## 2023-07-23 RX ORDER — DEXTROSE 50 % IN WATER 50 %
15 SYRINGE (ML) INTRAVENOUS ONCE
Refills: 0 | Status: DISCONTINUED | OUTPATIENT
Start: 2023-07-23 | End: 2023-07-26

## 2023-07-23 RX ORDER — GLUCAGON INJECTION, SOLUTION 0.5 MG/.1ML
1 INJECTION, SOLUTION SUBCUTANEOUS ONCE
Refills: 0 | Status: DISCONTINUED | OUTPATIENT
Start: 2023-07-23 | End: 2023-07-26

## 2023-07-23 RX ORDER — INSULIN NPH HUM/REG INSULIN HM 70-30/ML
0 VIAL (ML) SUBCUTANEOUS
Qty: 0 | Refills: 0 | DISCHARGE

## 2023-07-23 RX ADMIN — SODIUM CHLORIDE 1000 MILLILITER(S): 9 INJECTION INTRAMUSCULAR; INTRAVENOUS; SUBCUTANEOUS at 22:55

## 2023-07-23 RX ADMIN — SODIUM CHLORIDE 1000 MILLILITER(S): 9 INJECTION INTRAMUSCULAR; INTRAVENOUS; SUBCUTANEOUS at 17:40

## 2023-07-23 NOTE — ED PROVIDER NOTE - PROGRESS NOTE DETAILS
EP: Patient has history of diabetes, currently not taking medication, states she is waiting to be evaluated by high risk pregnancy OB/GYN before resuming her meds.  Her sugars are supposedly running in the low 200s. EP: Case endorsed to Dr. Garrett to follow-up imaging, reassess and dispo. TVUS Prelim suggestion ectopic pregnancy, OBGYN consulted.

## 2023-07-23 NOTE — H&P ADULT - HISTORY OF PRESENT ILLNESS
PGY 3 Note    Chief Complaint: vaginal spotting    HPI: 37yo  LMP 2023 at 7w0d presents to ED for vaginal spotting. Patient states today she  began having spotting. Denies saturating pads or clots. States she also has 2/10 midline cramping. Patient was seen by Dr. Guevara on  bhcg at that time was 332 and MFM sono showed no IUP with right corpus luteal cyst. She has a hx of poorly controlled T2DM is not currently taking any medications for treatment. Not following with endocrinologist as an outpatient. This is an unplanned but desired pregnancy.    Ob/Gyn History:  , NSVDx3, FT, largest 7-5, SABx3, D&C                LMP - 2023                   Cycle Length -  monthly  Denies history of ovarian cysts, uterine fibroids, abnormal paps, or STIs    Home Medications:  NONE    No Known Allergies    PAST MEDICAL & SURGICAL HISTORY:  Diabetes mellitus  H/O induced   H/O eye surgery  OU, to correct for cross eye    FAMILY HISTORY:  Family history of diabetes mellitus (Father, Mother)    SOCIAL HISTORY: Denies cigarette use, alcohol use, or illicit drug use    Vital Signs Last 24 Hrs  T(F): 96.7 (2023 16:03), Max: 96.7 (2023 16:03)  HR: 102 (2023 16:03) (102 - 102)  BP: 106/64 (2023 16:03) (106/64 - 106/64)  RR: 17 (2023 16:03) (17 - 17)  Height (cm): 154.9 (23 @ 16:03)  Weight (kg): 72.1 (23 @ 16:03)  BMI (kg/m2): 30 (23 @ 16:03)  BSA (m2): 1.71 (23 @ 16:03)    General Appearance - AAOx3, NAD  Heart - S1S2 regular rate and rhythm  Lung - CTA Bilaterally  Abdomen - Soft, nontender, nondistended, no rebound, no rigidity, no guarding, bowel sounds present    GYN/Pelvis:    Labia Majora - Normal  Labia Minora - Normal  Clitoris - Normal  Urethra - Normal  Vagina - Normal, scant bright red blood in vagina  Cervix - Normal, closed, no lesions    Uterus:  Size - Normal  Tenderness - None  Mass - None  Freely mobile    Adnexa:  Masses - None  Tenderness - None      Meds:   sodium chloride 0.9% Bolus 1000 milliLiter(s) IV Bolus once  sodium chloride 0.9% Bolus 1000 milliLiter(s) IV Bolus once    Height (cm): 154.9 (23 @ 16:03)  Weight (kg): 72.1 (23 @ 16:03)  BMI (kg/m2): 30 (23 @ 16:03)  BSA (m2): 1.71 (23 @ 16:03)    LABS:                        12.7   5.67  )-----------( 214      ( 2023 17:37 )             37.5     HCG Quantitative, Serum: 403.0 mIU/mL (23 @ 17:37)    ABO RH Interpretation: AB POS (23 @ 17:37)  Antibody Screen: NEG (23 @ 17:37)        134<L>  |  100  |  12  ----------------------------<  492<HH>  4.1   |  21  |  0.9    Ca    9.1      2023 17:37    TPro  6.7  /  Alb  4.2  /  TBili  0.2  /  DBili  x   /  AST  11  /  ALT  13  /  AlkPhos  129<H>        Urinalysis Basic - ( 2023 17:37 )    Color: Light Yellow / Appearance: Clear / S.035 / pH: x  Gluc: 492 mg/dL / Ketone: Moderate  / Bili: Negative / Urobili: <2 mg/dL   Blood: x / Protein: Negative / Nitrite: Negative   Leuk Esterase: Large / RBC: 7 /HPF / WBC 32 /HPF   Sq Epi: x / Non Sq Epi: x / Bacteria: Moderate    RADIOLOGY & ADDITIONAL STUDIES:  < from: US Transvaginal (23 @ 20:03) >  US TRANSVAGINAL   ORDERED BY: EVETTE MANJARREZ     PROCEDURE DATE:  2023          INTERPRETATION:  CLINICAL INFORMATION: 6 weeks pregnant, spotting.   Beta-hCG of 403, with inappropriate rise from 264 on 2023.    LMP:2023    COMPARISON: Pelvic ultrasound 2023.    TECHNIQUE:  Endovaginal and transabdominal pelvic sonogram. Color and spectral   Doppler were utilized.    FINDINGS:  Uterus: Measures 11.5 x 6.2 x 7.4 cm without evidence of an intrauterine   gestational sac. Thickened heterogeneous endometrium measures up to 2.8   cm.    Right ovary: 3.4 x 1.7 x 2.3 cm. Within normal limits. Doppler flow is   demonstrated.  Left ovary/adnexa: 2.2 x 1.0 x 1.9 cm. Doppler flow is demonstrated. An   echogenic solid paraovarian structure with peripheral flow measures 2.4   1.8 x 2.0 cm, moving separately from the ovary upon compression.    Fluid: None.    IMPRESSION:  No intrauterine gestational sac is visualized. Findings are compatible   with pregnancyof unknown location, and differential includes early   pregnancy and ectopic pregnancy.    2.4 cm echogenic left paraovarian structure with peripheral Doppler flow.   In the setting of slowly rising beta hCG and pregnancy of unknown   location this finding is highly suspicious for ectopic pregnancy and   clinical correlation is recommended.    A callback request was placed at the time of dictation.    Radiologist discussed preliminary findings with Dr. Brett Garrett on   2023 at approximately9:00 PM with readback.    --- End of Report ---          IGGY REED MD; Resident Radiologist  This document has been electronically signed.  RAMYA OAKES MD; Attending Radiologist  This document has been electronically signed. 2023  9:05PM    < end of copied text >   PGY 3 Note    Chief Complaint: vaginal spotting    HPI: 37yo  LMP 2023 at 7w0d presents to ED for vaginal spotting. Patient states today she  began having spotting. Denies saturating pads or clots. States she also has 2/10 midline cramping. Patient was seen by Dr. Guevara on  bhcg at that time was 332 and MFM sono showed no IUP with right corpus luteal cyst. She has a hx of poorly controlled T2DM is not currently taking any medications for treatment. Not following with endocrinologist as an outpatient.  A1c from  >15.5. Denies polydipsia or polyuria. No abdominal pain, nausea or vomiting.  This is an unplanned but desired pregnancy.    Ob/Gyn History:  , NSVDx3, FT, largest 7-5, SABx3, D&C                LMP - 2023                   Cycle Length -  monthly  Denies history of ovarian cysts, uterine fibroids, abnormal paps, or STIs    Home Medications:  NONE    No Known Allergies    PAST MEDICAL & SURGICAL HISTORY:  Diabetes mellitus  H/O induced   H/O eye surgery  OU, to correct for cross eye    FAMILY HISTORY:  Family history of diabetes mellitus (Father, Mother)    SOCIAL HISTORY: Denies cigarette use, alcohol use, or illicit drug use    Vital Signs Last 24 Hrs  T(F): 96.7 (2023 16:03), Max: 96.7 (2023 16:03)  HR: 102 (2023 16:03) (102 - 102)  BP: 106/64 (2023 16:03) (106/64 - 106/64)  RR: 17 (2023 16:03) (17 - 17)  Height (cm): 154.9 (23 @ 16:03)  Weight (kg): 72.1 (23 @ 16:03)  BMI (kg/m2): 30 (23 @ 16:03)  BSA (m2): 1.71 (23 @ 16:03)    General Appearance - AAOx3, NAD  Heart - S1S2 regular rate and rhythm  Lung - CTA Bilaterally  Abdomen - Soft, nontender, nondistended, no rebound, no rigidity, no guarding, bowel sounds present    GYN/Pelvis:    Labia Majora - Normal  Labia Minora - Normal  Clitoris - Normal  Urethra - Normal  Vagina - Normal, scant bright red blood in vagina  Cervix - Normal, closed, no lesions    Uterus:  Size - Normal  Tenderness - None  Mass - None  Freely mobile    Adnexa:  Masses - None  Tenderness - None      Meds:   sodium chloride 0.9% Bolus 1000 milliLiter(s) IV Bolus once  sodium chloride 0.9% Bolus 1000 milliLiter(s) IV Bolus once    Height (cm): 154.9 (23 @ 16:03)  Weight (kg): 72.1 (23 @ 16:03)  BMI (kg/m2): 30 (23 @ 16:03)  BSA (m2): 1.71 (23 @ 16:03)    LABS:                        12.7   5.67  )-----------( 214      ( 2023 17:37 )             37.5     HCG Quantitative, Serum: 403.0 mIU/mL (23 @ 17:37)    ABO RH Interpretation: AB POS (23 @ 17:37)  Antibody Screen: NEG (23 @ 17:37)        134<L>  |  100  |  12  ----------------------------<  492<HH>  4.1   |  21  |  0.9    Ca    9.1      2023 17:37    TPro  6.7  /  Alb  4.2  /  TBili  0.2  /  DBili  x   /  AST  11  /  ALT  13  /  AlkPhos  129<H>        Urinalysis Basic - ( 2023 17:37 )    Color: Light Yellow / Appearance: Clear / S.035 / pH: x  Gluc: 492 mg/dL / Ketone: Moderate  / Bili: Negative / Urobili: <2 mg/dL   Blood: x / Protein: Negative / Nitrite: Negative   Leuk Esterase: Large / RBC: 7 /HPF / WBC 32 /HPF   Sq Epi: x / Non Sq Epi: x / Bacteria: Moderate    RADIOLOGY & ADDITIONAL STUDIES:  < from: US Transvaginal (23 @ 20:03) >  US TRANSVAGINAL   ORDERED BY: EVETTE MANJARREZ     PROCEDURE DATE:  2023          INTERPRETATION:  CLINICAL INFORMATION: 6 weeks pregnant, spotting.   Beta-hCG of 403, with inappropriate rise from 264 on 2023.    LMP:2023    COMPARISON: Pelvic ultrasound 2023.    TECHNIQUE:  Endovaginal and transabdominal pelvic sonogram. Color and spectral   Doppler were utilized.    FINDINGS:  Uterus: Measures 11.5 x 6.2 x 7.4 cm without evidence of an intrauterine   gestational sac. Thickened heterogeneous endometrium measures up to 2.8   cm.    Right ovary: 3.4 x 1.7 x 2.3 cm. Within normal limits. Doppler flow is   demonstrated.  Left ovary/adnexa: 2.2 x 1.0 x 1.9 cm. Doppler flow is demonstrated. An   echogenic solid paraovarian structure with peripheral flow measures 2.4   1.8 x 2.0 cm, moving separately from the ovary upon compression.    Fluid: None.    IMPRESSION:  No intrauterine gestational sac is visualized. Findings are compatible   with pregnancyof unknown location, and differential includes early   pregnancy and ectopic pregnancy.    2.4 cm echogenic left paraovarian structure with peripheral Doppler flow.   In the setting of slowly rising beta hCG and pregnancy of unknown   location this finding is highly suspicious for ectopic pregnancy and   clinical correlation is recommended.    A callback request was placed at the time of dictation.    Radiologist discussed preliminary findings with Dr. Brett Garrett on   2023 at approximately9:00 PM with readback.    --- End of Report ---          IGGY REED MD; Resident Radiologist  This document has been electronically signed.  RAMYA OAKES MD; Attending Radiologist  This document has been electronically signed. 2023  9:05PM    < end of copied text >   PGY 3 Note    Chief Complaint: vaginal spotting    HPI: 37yo  LMP 2023 at 7w0d presents to ED for vaginal spotting. Patient states today she  began having spotting. Denies saturating pads or clots. States she also has 2/10 midline cramping. Patient was seen by Dr. Guevara on  bhcg at that time was 332 and MFM sono showed no IUP with right corpus luteal cyst. She has a hx of poorly controlled T2DM is not currently taking any medications for treatment. Not following with endocrinologist as an outpatient.  A1c from  >15.5. Denies polydipsia or polyuria. Endorses 2 days of dysuria. no fevers or chills. No abdominal pain, nausea or vomiting.  This is an unplanned but desired pregnancy.    Ob/Gyn History:  , NSVDx3, FT, largest 7-5, SABx3, D&C                LMP - 2023                   Cycle Length -  monthly  Denies history of ovarian cysts, uterine fibroids, abnormal paps, or STIs    Home Medications:  NONE    No Known Allergies    PAST MEDICAL & SURGICAL HISTORY:  Diabetes mellitus  H/O induced   H/O eye surgery  OU, to correct for cross eye    FAMILY HISTORY:  Family history of diabetes mellitus (Father, Mother)    SOCIAL HISTORY: Denies cigarette use, alcohol use, or illicit drug use    Vital Signs Last 24 Hrs  T(F): 96.7 (2023 16:03), Max: 96.7 (2023 16:03)  HR: 102 (2023 16:03) (102 - 102)  BP: 106/64 (2023 16:03) (106/64 - 106/64)  RR: 17 (2023 16:03) (17 - 17)  Height (cm): 154.9 (23 @ 16:03)  Weight (kg): 72.1 (23 @ 16:03)  BMI (kg/m2): 30 (23 @ 16:03)  BSA (m2): 1.71 (23 @ 16:03)    General Appearance - AAOx3, NAD  Heart - S1S2 regular rate and rhythm  Lung - CTA Bilaterally  Abdomen - Soft, nontender, nondistended, no rebound, no rigidity, no guarding, bowel sounds present    GYN/Pelvis:    Labia Majora - Normal  Labia Minora - Normal  Clitoris - Normal  Urethra - Normal  Vagina - Normal, scant bright red blood in vagina  Cervix - Normal, closed, no lesions    Uterus:  Size - Normal  Tenderness - None  Mass - None  Freely mobile    Adnexa:  Masses - None  Tenderness - None      Meds:   sodium chloride 0.9% Bolus 1000 milliLiter(s) IV Bolus once  sodium chloride 0.9% Bolus 1000 milliLiter(s) IV Bolus once    Height (cm): 154.9 (23 @ 16:03)  Weight (kg): 72.1 (23 @ 16:03)  BMI (kg/m2): 30 (23 @ 16:03)  BSA (m2): 1.71 (23 @ 16:03)    LABS:                        12.7   5.67  )-----------( 214      ( 2023 17:37 )             37.5     HCG Quantitative, Serum: 403.0 mIU/mL (23 @ 17:37)    ABO RH Interpretation: AB POS (23 @ 17:37)  Antibody Screen: NEG (23 @ 17:37)        134<L>  |  100  |  12  ----------------------------<  492<HH>  4.1   |  21  |  0.9    Ca    9.1      2023 17:37    TPro  6.7  /  Alb  4.2  /  TBili  0.2  /  DBili  x   /  AST  11  /  ALT  13  /  AlkPhos  129<H>        Urinalysis Basic - ( 2023 17:37 )    Color: Light Yellow / Appearance: Clear / S.035 / pH: x  Gluc: 492 mg/dL / Ketone: Moderate  / Bili: Negative / Urobili: <2 mg/dL   Blood: x / Protein: Negative / Nitrite: Negative   Leuk Esterase: Large / RBC: 7 /HPF / WBC 32 /HPF   Sq Epi: x / Non Sq Epi: x / Bacteria: Moderate    RADIOLOGY & ADDITIONAL STUDIES:  < from: US Transvaginal (23 @ 20:03) >  US TRANSVAGINAL   ORDERED BY: EVETTE MANJARREZ     PROCEDURE DATE:  2023          INTERPRETATION:  CLINICAL INFORMATION: 6 weeks pregnant, spotting.   Beta-hCG of 403, with inappropriate rise from 264 on 2023.    LMP:2023    COMPARISON: Pelvic ultrasound 2023.    TECHNIQUE:  Endovaginal and transabdominal pelvic sonogram. Color and spectral   Doppler were utilized.    FINDINGS:  Uterus: Measures 11.5 x 6.2 x 7.4 cm without evidence of an intrauterine   gestational sac. Thickened heterogeneous endometrium measures up to 2.8   cm.    Right ovary: 3.4 x 1.7 x 2.3 cm. Within normal limits. Doppler flow is   demonstrated.  Left ovary/adnexa: 2.2 x 1.0 x 1.9 cm. Doppler flow is demonstrated. An   echogenic solid paraovarian structure with peripheral flow measures 2.4   1.8 x 2.0 cm, moving separately from the ovary upon compression.    Fluid: None.    IMPRESSION:  No intrauterine gestational sac is visualized. Findings are compatible   with pregnancyof unknown location, and differential includes early   pregnancy and ectopic pregnancy.    2.4 cm echogenic left paraovarian structure with peripheral Doppler flow.   In the setting of slowly rising beta hCG and pregnancy of unknown   location this finding is highly suspicious for ectopic pregnancy and   clinical correlation is recommended.    A callback request was placed at the time of dictation.    Radiologist discussed preliminary findings with Dr. Brett Garrett on   2023 at approximately9:00 PM with readback.    --- End of Report ---          IGGY REED MD; Resident Radiologist  This document has been electronically signed.  RAMYA OAKES MD; Attending Radiologist  This document has been electronically signed. 2023  9:05PM    < end of copied text >

## 2023-07-23 NOTE — H&P ADULT - REASON FOR ADMISSION
glucose control [de-identified] : Left Hip/Thigh: Inspection of the hip/thigh is as follows: Inspection shows no swelling and no\par ecchymosis. Inspection of the wound reveals intact skin and no sign of infection. Range of motion of the hip is as\par follows: Patient displays good endpoint with internal rotation at 15 degrees Strength testing of the hip/thigh is as\par follows: Hip flexion strength is 5/5, Hip extension strength is 5/5, Hip abduction strength is 5/5 and Hip\par adduction strength is 5/5. Neurological testing of the hip/thigh is as follows: decreased sensation laterally. Gait\par and function is as follows: patient ambulates without assistive device. \par \par Right Knee: Inspection of the knee is as follows: no effusion, erythema, ecchymosis, scars or deformities. Palpation\par of the knee is as follows: medial joint line tenderness, medial femoral condyle tenderness, medial tibial\par plateau tenderness and patellar compression tenderness. Strength examination of the knee is as\par follows: Quadriceps strength is 4-/5 Ligament Stability and Special Test ligamentously stable.\par \par left knee: crepitus upon knee cap with medial and lateral t line tenderness. 0-130 with some flexion discomfort\par Left hip: No pain with rotation today

## 2023-07-23 NOTE — ED PROVIDER NOTE - CLINICAL SUMMARY MEDICAL DECISION MAKING FREE TEXT BOX
Endorsed from Dr. Vilchis  39 yo woman ~ 6 wks by gestation w/ VB. Last US was inconclusive and concern for miscarriage  Not taking DM meds as waiting on visit to high-risk pregnancy    US today concerning for ectopic pregnancy given low HCG and structure w/ blood flow in adnexa  pt HD stable  Plan: consult to GYN for continued management Endorsed from Dr. Vilchis  37 yo woman ~ 6 wks by gestation w/ VB. Last US was inconclusive and concern for miscarriage  Not taking DM meds as waiting on visit to high-risk pregnancy    US today concerning for ectopic pregnancy given low HCG and structure w/ blood flow in adnexa  pt HD stable  Plan: consult to GYN for continued management    Discussed with GYN. Believe this is ectopic. Will order methotrexate and would like to admit for glucose control

## 2023-07-23 NOTE — H&P ADULT - ASSESSMENT
39yo  LMP 2023 at 7w0d with vaginal spotting, abnormally rising bhcg, US findings suspicious for ectopic pregnancy, to be admitted for glucose control.    - admit to Dr. Da Silva  - Pt counseled on medical vs surgical treatment options for ectopic pregnancy. Risks of MTX including but not limited to GI complaints, possible rupture of ectopic, possible need for re-dose, and possible need for surgery discussed. Risk of surgery including infection, bleeding, damage to adjacent organs, scarring with salpingostomy, and possible removal of fallopian tube. She is a candidate for Methotrexate tx. Methotrexate 88 mg IM once into buttock. Pt does not have contraindications to MTX including blood dyscrasia, active pulmonary disease, hepatic /renal disease, or hematologic dysfunction Pt was counseled on avoiding folic acid containing foods, prenatal vitamins, alcohol, breastfeeding, as well as intercourse. Pt understands she cannot attempt to conceive for at least 3 months.   - repeat bhcg  (day 4) and  (day 7)  - low dose sliding scale ordered   - [facility]  - endocrinology consult ordered  - vitals per routine  - AM labs  - activity as tolerated  - carb consistent diet    Dr. Da Silva aware

## 2023-07-23 NOTE — ED ADULT NURSE NOTE - NSFALLUNIVINTERV_ED_ALL_ED
Bed/Stretcher in lowest position, wheels locked, appropriate side rails in place/Call bell, personal items and telephone in reach/Instruct patient to call for assistance before getting out of bed/chair/stretcher/Non-slip footwear applied when patient is off stretcher/[place] to call system/Physically safe environment - no spills, clutter or unnecessary equipment/Purposeful proactive rounding/Room/bathroom lighting operational, light cord in reach

## 2023-07-23 NOTE — ED PROVIDER NOTE - ATTENDING CONTRIBUTION TO CARE
38-year-old female PMH DM currently pregnant, LMP 6/4 presenting for evaluation of mild vaginal spotting since yesterday.  Patient has already seen her OB/GYN, had a pelvic sono, is supposed to get repeat sono in 4 days for further evaluation of this pregnancy.  Denies any  lightheadedness, no chest pain shortness of breath, no abdominal or flank pain,  no urinary or other complaints.  Well-appearing female no acute distress, PERRL, pink conjunctivae, MMM, speaking full sentences, lungs clear to auscultation bilaterally, RRR, well-perfused extremities, abdomen soft/NT/ND, midline spine no CVA TTP skin normal color temperature, patient awake alert x3, normal neuro exam.  Plan: Analgesia IV fluids, labs, pelvic sono, reassess.  Patient is amenable to plan.

## 2023-07-23 NOTE — H&P ADULT - ATTENDING COMMENTS
Abnormal rise in bhcg-likely ectopic  due to elevated glucose and hba1c risks of surgery discussed  mtx as above

## 2023-07-23 NOTE — ED PROVIDER NOTE - OBJECTIVE STATEMENT
Pt is a 37 y/o F  presenting to ER for a one day history of vaginal spotting. Pt is 6 weeks pregnant, sees Dr. Cox. Last US in office performed last week reportedly concerning for possible miscarriage, pt was advised to return this Thursday for repeat sonogram. Today, ot had scant vaginal bleeding, pink in color associated with abd lower cramping. No abnormal discharge reported, pt is sexually active. Denies rectal abn, hematochezia, hematuria, bowel or bladder incontince. Pt in NAD on exam.

## 2023-07-23 NOTE — ED ADULT NURSE NOTE - NS ED NOTE  TALK SOMEONE YN
D/w pt that I can give a large loading dose of Prednisone and extend her steroid taper but she if she con't to have no improvement OR if symptoms worsen at all she needs to go to the ER for admission as she is almost considered failure of OP therapy - will add rescue inhaler to give her additional relief, con't Airduo bid and nebs bid prn, encouraged to con't with smoking cessation attempt
No smoking in a week d/t above resp symptoms - congratulated and encouraged to con't with cessation attempt
No

## 2023-07-23 NOTE — ED PROVIDER NOTE - PHYSICAL EXAMINATION
CONSTITUTIONAL: awake, sitting in stretcher in no acute distress  HEAD: Normocephalic; atraumatic  CARD:  Regular rate and rhythm; S1, S2 normal; no murmurs, gallops, or rubs  RESP: CTAB; No wheezes, crackles, rales or rhonchi  ABD: Soft, nontender, nondistended, nonrigid, no guarding or rebound tenderness  EXT: Normal ROM,  no edema, good radial pulse  NEURO: A&O x3, speaking in full clear sentences, no facial asymmetry, moving all extremities

## 2023-07-24 LAB
ALBUMIN SERPL ELPH-MCNC: 3.6 G/DL — SIGNIFICANT CHANGE UP (ref 3.5–5.2)
ALP SERPL-CCNC: 78 U/L — SIGNIFICANT CHANGE UP (ref 30–115)
ALT FLD-CCNC: 11 U/L — SIGNIFICANT CHANGE UP (ref 0–41)
ANION GAP SERPL CALC-SCNC: 16 MMOL/L — HIGH (ref 7–14)
AST SERPL-CCNC: 10 U/L — SIGNIFICANT CHANGE UP (ref 0–41)
B-OH-BUTYR SERPL-SCNC: 2.3 MMOL/L — HIGH
BASOPHILS # BLD AUTO: 0.02 K/UL — SIGNIFICANT CHANGE UP (ref 0–0.2)
BASOPHILS NFR BLD AUTO: 0.3 % — SIGNIFICANT CHANGE UP (ref 0–1)
BILIRUB SERPL-MCNC: 0.4 MG/DL — SIGNIFICANT CHANGE UP (ref 0.2–1.2)
BUN SERPL-MCNC: 10 MG/DL — SIGNIFICANT CHANGE UP (ref 10–20)
CALCIUM SERPL-MCNC: 8.8 MG/DL — SIGNIFICANT CHANGE UP (ref 8.4–10.5)
CHLORIDE SERPL-SCNC: 103 MMOL/L — SIGNIFICANT CHANGE UP (ref 98–110)
CO2 SERPL-SCNC: 17 MMOL/L — SIGNIFICANT CHANGE UP (ref 17–32)
CREAT SERPL-MCNC: 0.5 MG/DL — LOW (ref 0.7–1.5)
EGFR: 122 ML/MIN/1.73M2 — SIGNIFICANT CHANGE UP
EOSINOPHIL # BLD AUTO: 0.05 K/UL — SIGNIFICANT CHANGE UP (ref 0–0.7)
EOSINOPHIL NFR BLD AUTO: 0.9 % — SIGNIFICANT CHANGE UP (ref 0–8)
GLUCOSE BLDC GLUCOMTR-MCNC: 181 MG/DL — HIGH (ref 70–99)
GLUCOSE BLDC GLUCOMTR-MCNC: 287 MG/DL — HIGH (ref 70–99)
GLUCOSE BLDC GLUCOMTR-MCNC: 307 MG/DL — HIGH (ref 70–99)
GLUCOSE BLDC GLUCOMTR-MCNC: 378 MG/DL — HIGH (ref 70–99)
GLUCOSE SERPL-MCNC: 301 MG/DL — HIGH (ref 70–99)
HCT VFR BLD CALC: 36.2 % — LOW (ref 37–47)
HGB BLD-MCNC: 11.9 G/DL — LOW (ref 12–16)
IMM GRANULOCYTES NFR BLD AUTO: 0.5 % — HIGH (ref 0.1–0.3)
LACTATE SERPL-SCNC: 1.7 MMOL/L — SIGNIFICANT CHANGE UP (ref 0.7–2)
LYMPHOCYTES # BLD AUTO: 1.49 K/UL — SIGNIFICANT CHANGE UP (ref 1.2–3.4)
LYMPHOCYTES # BLD AUTO: 26 % — SIGNIFICANT CHANGE UP (ref 20.5–51.1)
MAGNESIUM SERPL-MCNC: 1.8 MG/DL — SIGNIFICANT CHANGE UP (ref 1.8–2.4)
MCHC RBC-ENTMCNC: 25.6 PG — LOW (ref 27–31)
MCHC RBC-ENTMCNC: 32.9 G/DL — SIGNIFICANT CHANGE UP (ref 32–37)
MCV RBC AUTO: 78 FL — LOW (ref 81–99)
MONOCYTES # BLD AUTO: 0.46 K/UL — SIGNIFICANT CHANGE UP (ref 0.1–0.6)
MONOCYTES NFR BLD AUTO: 8 % — SIGNIFICANT CHANGE UP (ref 1.7–9.3)
NEUTROPHILS # BLD AUTO: 3.68 K/UL — SIGNIFICANT CHANGE UP (ref 1.4–6.5)
NEUTROPHILS NFR BLD AUTO: 64.3 % — SIGNIFICANT CHANGE UP (ref 42.2–75.2)
NRBC # BLD: 0 /100 WBCS — SIGNIFICANT CHANGE UP (ref 0–0)
PHOSPHATE SERPL-MCNC: 3.1 MG/DL — SIGNIFICANT CHANGE UP (ref 2.1–4.9)
PLATELET # BLD AUTO: 214 K/UL — SIGNIFICANT CHANGE UP (ref 130–400)
PMV BLD: 12.1 FL — HIGH (ref 7.4–10.4)
POTASSIUM SERPL-MCNC: 3.8 MMOL/L — SIGNIFICANT CHANGE UP (ref 3.5–5)
POTASSIUM SERPL-SCNC: 3.8 MMOL/L — SIGNIFICANT CHANGE UP (ref 3.5–5)
PROT SERPL-MCNC: 5.7 G/DL — LOW (ref 6–8)
RBC # BLD: 4.64 M/UL — SIGNIFICANT CHANGE UP (ref 4.2–5.4)
RBC # FLD: 13.2 % — SIGNIFICANT CHANGE UP (ref 11.5–14.5)
SODIUM SERPL-SCNC: 136 MMOL/L — SIGNIFICANT CHANGE UP (ref 135–146)
WBC # BLD: 5.73 K/UL — SIGNIFICANT CHANGE UP (ref 4.8–10.8)
WBC # FLD AUTO: 5.73 K/UL — SIGNIFICANT CHANGE UP (ref 4.8–10.8)

## 2023-07-24 PROCEDURE — 99232 SBSQ HOSP IP/OBS MODERATE 35: CPT

## 2023-07-24 PROCEDURE — 93010 ELECTROCARDIOGRAM REPORT: CPT | Mod: 77

## 2023-07-24 PROCEDURE — 93010 ELECTROCARDIOGRAM REPORT: CPT

## 2023-07-24 RX ORDER — GLUCAGON INJECTION, SOLUTION 0.5 MG/.1ML
1 INJECTION, SOLUTION SUBCUTANEOUS ONCE
Refills: 0 | Status: DISCONTINUED | OUTPATIENT
Start: 2023-07-24 | End: 2023-07-26

## 2023-07-24 RX ORDER — SODIUM CHLORIDE 9 MG/ML
1000 INJECTION, SOLUTION INTRAVENOUS
Refills: 0 | Status: DISCONTINUED | OUTPATIENT
Start: 2023-07-24 | End: 2023-07-26

## 2023-07-24 RX ORDER — INSULIN GLARGINE 100 [IU]/ML
16 INJECTION, SOLUTION SUBCUTANEOUS
Qty: 2 | Refills: 0
Start: 2023-07-24 | End: 2023-08-22

## 2023-07-24 RX ORDER — DEXTROSE 50 % IN WATER 50 %
15 SYRINGE (ML) INTRAVENOUS ONCE
Refills: 0 | Status: DISCONTINUED | OUTPATIENT
Start: 2023-07-24 | End: 2023-07-26

## 2023-07-24 RX ORDER — INSULIN LISPRO 100/ML
VIAL (ML) SUBCUTANEOUS
Refills: 0 | Status: DISCONTINUED | OUTPATIENT
Start: 2023-07-24 | End: 2023-07-26

## 2023-07-24 RX ORDER — INSULIN GLARGINE 100 [IU]/ML
16 INJECTION, SOLUTION SUBCUTANEOUS AT BEDTIME
Refills: 0 | Status: DISCONTINUED | OUTPATIENT
Start: 2023-07-24 | End: 2023-07-26

## 2023-07-24 RX ORDER — INSULIN LISPRO 100/ML
4 VIAL (ML) SUBCUTANEOUS
Qty: 2 | Refills: 0
Start: 2023-07-24 | End: 2023-08-22

## 2023-07-24 RX ORDER — CEPHALEXIN 500 MG
500 CAPSULE ORAL EVERY 6 HOURS
Refills: 0 | Status: DISCONTINUED | OUTPATIENT
Start: 2023-07-24 | End: 2023-07-26

## 2023-07-24 RX ORDER — DEXTROSE 50 % IN WATER 50 %
25 SYRINGE (ML) INTRAVENOUS ONCE
Refills: 0 | Status: DISCONTINUED | OUTPATIENT
Start: 2023-07-24 | End: 2023-07-26

## 2023-07-24 RX ORDER — ACETAMINOPHEN 500 MG
650 TABLET ORAL EVERY 6 HOURS
Refills: 0 | Status: DISCONTINUED | OUTPATIENT
Start: 2023-07-24 | End: 2023-07-26

## 2023-07-24 RX ORDER — INSULIN LISPRO 100/ML
4 VIAL (ML) SUBCUTANEOUS
Refills: 0 | Status: DISCONTINUED | OUTPATIENT
Start: 2023-07-24 | End: 2023-07-26

## 2023-07-24 RX ADMIN — Medication 500 MILLIGRAM(S): at 21:04

## 2023-07-24 RX ADMIN — METHOTREXATE 88 MILLIGRAM(S): 2.5 TABLET ORAL at 01:59

## 2023-07-24 RX ADMIN — Medication 500 MILLIGRAM(S): at 09:05

## 2023-07-24 RX ADMIN — Medication 500 MILLIGRAM(S): at 17:24

## 2023-07-24 RX ADMIN — Medication 1: at 09:05

## 2023-07-24 RX ADMIN — INSULIN GLARGINE 16 UNIT(S): 100 INJECTION, SOLUTION SUBCUTANEOUS at 22:04

## 2023-07-24 RX ADMIN — Medication 3: at 00:10

## 2023-07-24 RX ADMIN — Medication 4 UNIT(S): at 17:26

## 2023-07-24 RX ADMIN — Medication 4: at 11:59

## 2023-07-24 RX ADMIN — Medication 500 MILLIGRAM(S): at 01:47

## 2023-07-24 RX ADMIN — Medication 3: at 22:03

## 2023-07-24 NOTE — CONSULT NOTE ADULT - SUBJECTIVE AND OBJECTIVE BOX
Request for consultation:  Requested by:    Patient is a 38y old  Female who presents with a chief complaint of glucose control (2023 06:10)    HPI:  PGY 3 Note    Chief Complaint: vaginal spotting    HPI: 39yo  LMP 2023 at 7w0d presents to ED for vaginal spotting. Patient states today she  began having spotting. Denies saturating pads or clots. States she also has 2/10 midline cramping. Patient was seen by Dr. Guevara on  bhcg at that time was 332 and MFM sono showed no IUP with right corpus luteal cyst. She has a hx of poorly controlled T2DM is not currently taking any medications for treatment. Not following with endocrinologist as an outpatient.  A1c from  >15.5. Denies polydipsia or polyuria. Endorses 2 days of dysuria. no fevers or chills. No abdominal pain, nausea or vomiting.  This is an unplanned but desired pregnancy.    Ob/Gyn History:  , NSVDx3, FT, largest 7-5, SABx3, D&C                LMP - 2023                   Cycle Length -  monthly  Denies history of ovarian cysts, uterine fibroids, abnormal paps, or STIs    Home Medications:  NONE    No Known Allergies    PAST MEDICAL & SURGICAL HISTORY:  Diabetes mellitus  H/O induced   H/O eye surgery  OU, to correct for cross eye    FAMILY HISTORY:  Family history of diabetes mellitus (Father, Mother)    SOCIAL HISTORY: Denies cigarette use, alcohol use, or illicit drug use    Vital Signs Last 24 Hrs  T(F): 96.7 (2023 16:03), Max: 96.7 (2023 16:03)  HR: 102 (2023 16:03) (102 - 102)  BP: 106/64 (2023 16:03) (106/64 - 106/64)  RR: 17 (2023 16:03) (17 - 17)  Height (cm): 154.9 (23 @ 16:03)  Weight (kg): 72.1 (23 @ 16:03)  BMI (kg/m2): 30 (23 @ 16:03)  BSA (m2): 1.71 (23 @ 16:03)    General Appearance - AAOx3, NAD  Heart - S1S2 regular rate and rhythm  Lung - CTA Bilaterally  Abdomen - Soft, nontender, nondistended, no rebound, no rigidity, no guarding, bowel sounds present    GYN/Pelvis:    Labia Majora - Normal  Labia Minora - Normal  Clitoris - Normal  Urethra - Normal  Vagina - Normal, scant bright red blood in vagina  Cervix - Normal, closed, no lesions    Uterus:  Size - Normal  Tenderness - None  Mass - None  Freely mobile    Adnexa:  Masses - None  Tenderness - None      Meds:   sodium chloride 0.9% Bolus 1000 milliLiter(s) IV Bolus once  sodium chloride 0.9% Bolus 1000 milliLiter(s) IV Bolus once    Height (cm): 154.9 (23 @ 16:03)  Weight (kg): 72.1 (23 @ 16:03)  BMI (kg/m2): 30 (23 @ 16:03)  BSA (m2): 1.71 (23 @ 16:03)    LABS:                        12.7   5.67  )-----------( 214      ( 2023 17:37 )             37.5     HCG Quantitative, Serum: 403.0 mIU/mL (23 @ 17:37)    ABO RH Interpretation: AB POS (23 @ 17:37)  Antibody Screen: NEG (23 @ 17:37)        134<L>  |  100  |  12  ----------------------------<  492<HH>  4.1   |  21  |  0.9    Ca    9.1      2023 17:37    TPro  6.7  /  Alb  4.2  /  TBili  0.2  /  DBili  x   /  AST  11  /  ALT  13  /  AlkPhos  129<H>        Urinalysis Basic - ( 2023 17:37 )    Color: Light Yellow / Appearance: Clear / S.035 / pH: x  Gluc: 492 mg/dL / Ketone: Moderate  / Bili: Negative / Urobili: <2 mg/dL   Blood: x / Protein: Negative / Nitrite: Negative   Leuk Esterase: Large / RBC: 7 /HPF / WBC 32 /HPF   Sq Epi: x / Non Sq Epi: x / Bacteria: Moderate    RADIOLOGY & ADDITIONAL STUDIES:  < from: US Transvaginal (23 @ 20:03) >  US TRANSVAGINAL   ORDERED BY: EVETTE MANJARREZ     PROCEDURE DATE:  2023          INTERPRETATION:  CLINICAL INFORMATION: 6 weeks pregnant, spotting.   Beta-hCG of 403, with inappropriate rise from 264 on 2023.    LMP:2023    COMPARISON: Pelvic ultrasound 2023.    TECHNIQUE:  Endovaginal and transabdominal pelvic sonogram. Color and spectral   Doppler were utilized.    FINDINGS:  Uterus: Measures 11.5 x 6.2 x 7.4 cm without evidence of an intrauterine   gestational sac. Thickened heterogeneous endometrium measures up to 2.8   cm.    Right ovary: 3.4 x 1.7 x 2.3 cm. Within normal limits. Doppler flow is   demonstrated.  Left ovary/adnexa: 2.2 x 1.0 x 1.9 cm. Doppler flow is demonstrated. An   echogenic solid paraovarian structure with peripheral flow measures 2.4   1.8 x 2.0 cm, moving separately from the ovary upon compression.    Fluid: None.    IMPRESSION:  No intrauterine gestational sac is visualized. Findings are compatible   with pregnancyof unknown location, and differential includes early   pregnancy and ectopic pregnancy.    2.4 cm echogenic left paraovarian structure with peripheral Doppler flow.   In the setting of slowly rising beta hCG and pregnancy of unknown   location this finding is highly suspicious for ectopic pregnancy and   clinical correlation is recommended.    A callback request was placed at the time of dictation.    Radiologist discussed preliminary findings with Dr. Brett Garrett on   2023 at approximately9:00 PM with readback.    --- End of Report ---          IGGY REED MD; Resident Radiologist  This document has been electronically signed.  RAMYA OAKES MD; Attending Radiologist  This document has been electronically signed. 2023  9:05PM    < end of copied text >   (2023 23:44)      FAMILY HISTORY:  Family history of diabetes mellitus (Father, Mother)      PAST MEDICAL & SURGICAL HISTORY:  Diabetes mellitus      H/O induced       H/O eye surgery  OU, to correct for cross eye      History of D&C  x 2        Birth History:  Developmental History:    Review of Systems:  All review of systems negative, except for those marked:  General:		[] Abnormal:  Pulmonary:		[] Abnormal:  Cardiac:		[] Abnormal:  Gastrointestinal:	[] Abnormal:  ENT:			[] Abnormal:  Renal/Urologic:		[] Abnormal:  Musculoskeletal:	[] Abnormal:  Endocrine:		[] Abnormal:  Hematologic:		[] Abnormal:  Neurologic:		[] Abnormal:  Skin:			[] Abnormal:  Allergy/Immune:	[] Abnormal:  Psychiatric:		[] Abnormal:    Allergies    No Known Allergies    Intolerances      MEDICATIONS  (STANDING):  cephalexin 500 milliGRAM(s) Oral every 6 hours  dextrose 5%. 1000 milliLiter(s) (100 mL/Hr) IV Continuous <Continuous>  dextrose 5%. 1000 milliLiter(s) (50 mL/Hr) IV Continuous <Continuous>  dextrose 50% Injectable 25 Gram(s) IV Push once  dextrose 50% Injectable 12.5 Gram(s) IV Push once  dextrose 50% Injectable 25 Gram(s) IV Push once  glucagon  Injectable 1 milliGRAM(s) IntraMuscular once  insulin lispro (ADMELOG) corrective regimen sliding scale   SubCutaneous three times a day before meals  insulin lispro (ADMELOG) corrective regimen sliding scale   SubCutaneous at bedtime    MEDICATIONS  (PRN):  acetaminophen     Tablet .. 650 milliGRAM(s) Oral every 6 hours PRN Mild Pain (1 - 3)  dextrose Oral Gel 15 Gram(s) Oral once PRN Blood Glucose LESS THAN 70 milliGRAM(s)/deciliter      Vital Signs Last 24 Hrs  T(C): 36.2 (2023 07:54), Max: 36.4 (2023 00:53)  T(F): 97.2 (2023 07:54), Max: 97.6 (2023 00:53)  HR: 97 (2023 07:54) (84 - 102)  BP: 111/68 (2023 07:54) (102/73 - 111/68)  BP(mean): --  RR: 18 (2023 07:54) (17 - 18)  SpO2: 100% (2023 07:54) (98% - 100%)    Parameters below as of 2023 07:54  Patient On (Oxygen Delivery Method): room air      Height (cm): 154.9 ( @ 16:03)  Weight (kg): 72.1 ( @ 16:03)  BMI (kg/m2): 30 ( @ 16:03)    PHYSICAL EXAM  All physical exam findings normal, except those marked:  General:	Alert, active, cooperative, NAD, well hydrated  Neck		Normal: supple, no cervical adenopathy, no palpable thyroid  Cardiovascular	Normal: regular rate, normal S1, S2, no murmurs  Respiratory	Normal: no chest wall deformity, normal respiratory pattern, CTA B/L  Abdominal	Normal: soft, ND, NT, bowel sounds present, no masses, no organomegaly  		Normal normal genitalia, testes descended, circumcised/uncircumcised  .		Kar stage:			Breast kar:  .		Menstrual history:  Extremities	Normal: FROM x4  Skin		Normal: intact and not indurated, no rash, no acanthosis nigricans  Neurologic	Normal: grossly intact    LABS                          12.7   5.67  )-----------( 214      ( 2023 17:37 )             37.5         134<L>  |  100  |  12  ----------------------------<  492<HH>  4.1   |  21  |  0.9    Ca    9.1      2023 17:37    TPro  6.7  /  Alb  4.2  /  TBili  0.2  /  DBili  x   /  AST  11  /  ALT  13  /  AlkPhos  129<H>        Ketone - Urine: Moderate ( @ 17:37)    CAPILLARY BLOOD GLUCOSE      POCT Blood Glucose.: 181 mg/dL (2023 08:40)  POCT Blood Glucose.: 362 mg/dL (2023 23:25)

## 2023-07-24 NOTE — CONSULT NOTE ADULT - ATTENDING COMMENTS
37yo  LMP 2023 at 7w0d presents to ED for vaginal spotting. Ms. Arora states that she was diagnosed with type 2 diabetes mellitus around age 16.  She was initially tried on metformin but was switched to insulin when she got pregnant first, and has since been on multiple insulin injections.  She states that she stopped taking insulin around 3 to 4 months ago due to 'spiritual' reasons and her most recent A1c was found to be 15.5.  She states she has been on metformin and has also tried Trulicity in the past.  She did not like the fact that she lost a lot of weight on Trulicity and discontinued it. She follows endocrinologist at [facility].  She does not recollect her original regimen of insulin    Diabetes mellitus  -Under poor control with A1c 15.5 in 2023  -Came in with blood glucose readings in the 400s, recommend to start with basal bolus insulin  -Can start with Lantus 16 units and insulin lispro 4 units 3 times a day with meals and sliding scale 1 unit for every 50 mg/dL above 150 mg/dL  -Discussed difference between type II versus type 1 diabetes mellitus patient states that she has always been diagnosed as Type 2   -Obtain a C-peptide with serum glucose and insulin antibodies including ryan 65, IA 2 antibody islet cell antibodies, zinc transporter antibody and insulin antibodies  -Will recommend DC on basal bolus regimen and followup with her endocrinologist, can consider other GLP1 or SGLT2 inibitors but she states she plans to conceive again and may need to be on insulin. Discussed importance of compliance with medications

## 2023-07-24 NOTE — CONSULT NOTE ADULT - ASSESSMENT
39yo  LMP 2023 at 7w0d with vaginal spotting, abnormally rising bhcg, US findings suspicious for ectopic pregnancy. Endocrinology was consulted for poor glycemic control. Patient was diagnosed with Type 2 DM at age 16. She was on insulin but stopped couple of months ago after she found out she was pregnant. Patient states that she does not adhere to DASH CC diet and exercises minimally. She does not monitor FS at home, currently endorsing polyuria and polydysplasia. Patient came in with FS of 492 --> improved to 181 on ISS, BHB 2.3, moderate ketones in the urine, no AG. Patient's last A1C is 15.5 from 2023.     IMPRESSION   Hx of type 2 DM   - Poor glycemic control   - A1C 15.5 from 2023  - currently not taking insulin at home   - only on ISS inpatient     plan  - start patient on CC DASH diet   - advised on diet and lifestyle modification   - start on Lantus and Lispro + ISS   - OP Endo follow up     **This is an incomplete note**   39yo  LMP 2023 at 7w0d with vaginal spotting, abnormally rising bhcg, US findings suspicious for ectopic pregnancy. Endocrinology was consulted for poor glycemic control. Patient was diagnosed with Type 2 DM at age 16. She was on insulin but stopped couple of months ago after she found out she was pregnant. Patient states that she was previously on Trulicity and Metformin, did not tolerate. Patient states that she does not adhere to DASH CC diet and exercises minimally. She does not monitor FS at home, currently endorsing polyuria and polydysplasia. Patient came in with FS of 492 --> improved to 181 on ISS, BHB 2.3, moderate ketones in the urine, no AG. Patient's last A1C is 15.5 from 2023.     IMPRESSION   Hx of type 2 DM   - Poor glycemic control, off insulin for 3 months   - A1C 15.5 from 2023  - only on ISS inpatient     plan  - start patient on CC DASH diet   - advised on diet and lifestyle modification   - start on Lantus 16U at bedtime and Lispro 4U TID before meals + ISS   - please send C-peptide level with serum glucose level, GIANLUCA 65 ab, Islet cell ab, IA2 ab, zinc transporter ab, and insuline ab   - OP Endo follow up   - please send patient diabetic supplies and discharge on Basal+bolus insulin

## 2023-07-24 NOTE — PATIENT PROFILE ADULT - FALL HARM RISK - UNIVERSAL INTERVENTIONS
Bed in lowest position, wheels locked, appropriate side rails in place/Call bell, personal items and telephone in reach/Instruct patient to call for assistance before getting out of bed or chair/Non-slip footwear when patient is out of bed/[place] to call system/Physically safe environment - no spills, clutter or unnecessary equipment/Purposeful Proactive Rounding/Room/bathroom lighting operational, light cord in reach

## 2023-07-25 ENCOUNTER — TRANSCRIPTION ENCOUNTER (OUTPATIENT)
Age: 38
End: 2023-07-25

## 2023-07-25 LAB
A1C WITH ESTIMATED AVERAGE GLUCOSE RESULT: >15.5 % — HIGH (ref 4–5.6)
ANION GAP SERPL CALC-SCNC: 11 MMOL/L — SIGNIFICANT CHANGE UP (ref 7–14)
B-OH-BUTYR SERPL-SCNC: 0.7 MMOL/L — HIGH
BASOPHILS # BLD AUTO: 0.02 K/UL — SIGNIFICANT CHANGE UP (ref 0–0.2)
BASOPHILS NFR BLD AUTO: 0.4 % — SIGNIFICANT CHANGE UP (ref 0–1)
BUN SERPL-MCNC: 13 MG/DL — SIGNIFICANT CHANGE UP (ref 10–20)
C PEPTIDE SERPL-MCNC: 0.7 NG/ML — LOW (ref 1.1–4.4)
CALCIUM SERPL-MCNC: 8.8 MG/DL — SIGNIFICANT CHANGE UP (ref 8.4–10.5)
CHLORIDE SERPL-SCNC: 102 MMOL/L — SIGNIFICANT CHANGE UP (ref 98–110)
CO2 SERPL-SCNC: 19 MMOL/L — SIGNIFICANT CHANGE UP (ref 17–32)
CREAT SERPL-MCNC: 0.5 MG/DL — LOW (ref 0.7–1.5)
CULTURE RESULTS: SIGNIFICANT CHANGE UP
EGFR: 122 ML/MIN/1.73M2 — SIGNIFICANT CHANGE UP
EOSINOPHIL # BLD AUTO: 0.05 K/UL — SIGNIFICANT CHANGE UP (ref 0–0.7)
EOSINOPHIL NFR BLD AUTO: 0.9 % — SIGNIFICANT CHANGE UP (ref 0–8)
ESTIMATED AVERAGE GLUCOSE: >398 MG/DL — HIGH (ref 68–114)
GLUCOSE BLDC GLUCOMTR-MCNC: 190 MG/DL — HIGH (ref 70–99)
GLUCOSE BLDC GLUCOMTR-MCNC: 206 MG/DL — HIGH (ref 70–99)
GLUCOSE BLDC GLUCOMTR-MCNC: 263 MG/DL — HIGH (ref 70–99)
GLUCOSE BLDC GLUCOMTR-MCNC: 267 MG/DL — HIGH (ref 70–99)
GLUCOSE BLDC GLUCOMTR-MCNC: 268 MG/DL — HIGH (ref 70–99)
GLUCOSE SERPL-MCNC: 236 MG/DL — HIGH (ref 70–99)
HCT VFR BLD CALC: 38.1 % — SIGNIFICANT CHANGE UP (ref 37–47)
HGB BLD-MCNC: 12.4 G/DL — SIGNIFICANT CHANGE UP (ref 12–16)
IMM GRANULOCYTES NFR BLD AUTO: 0.2 % — SIGNIFICANT CHANGE UP (ref 0.1–0.3)
LYMPHOCYTES # BLD AUTO: 1.55 K/UL — SIGNIFICANT CHANGE UP (ref 1.2–3.4)
LYMPHOCYTES # BLD AUTO: 28.8 % — SIGNIFICANT CHANGE UP (ref 20.5–51.1)
MAGNESIUM SERPL-MCNC: 2 MG/DL — SIGNIFICANT CHANGE UP (ref 1.8–2.4)
MCHC RBC-ENTMCNC: 25.5 PG — LOW (ref 27–31)
MCHC RBC-ENTMCNC: 32.5 G/DL — SIGNIFICANT CHANGE UP (ref 32–37)
MCV RBC AUTO: 78.4 FL — LOW (ref 81–99)
MONOCYTES # BLD AUTO: 0.38 K/UL — SIGNIFICANT CHANGE UP (ref 0.1–0.6)
MONOCYTES NFR BLD AUTO: 7.1 % — SIGNIFICANT CHANGE UP (ref 1.7–9.3)
NEUTROPHILS # BLD AUTO: 3.38 K/UL — SIGNIFICANT CHANGE UP (ref 1.4–6.5)
NEUTROPHILS NFR BLD AUTO: 62.6 % — SIGNIFICANT CHANGE UP (ref 42.2–75.2)
NRBC # BLD: 0 /100 WBCS — SIGNIFICANT CHANGE UP (ref 0–0)
PHOSPHATE SERPL-MCNC: 3.5 MG/DL — SIGNIFICANT CHANGE UP (ref 2.1–4.9)
PLATELET # BLD AUTO: 234 K/UL — SIGNIFICANT CHANGE UP (ref 130–400)
PMV BLD: 11.9 FL — HIGH (ref 7.4–10.4)
POTASSIUM SERPL-MCNC: 3.9 MMOL/L — SIGNIFICANT CHANGE UP (ref 3.5–5)
POTASSIUM SERPL-SCNC: 3.9 MMOL/L — SIGNIFICANT CHANGE UP (ref 3.5–5)
RBC # BLD: 4.86 M/UL — SIGNIFICANT CHANGE UP (ref 4.2–5.4)
RBC # FLD: 13.1 % — SIGNIFICANT CHANGE UP (ref 11.5–14.5)
SODIUM SERPL-SCNC: 132 MMOL/L — LOW (ref 135–146)
SPECIMEN SOURCE: SIGNIFICANT CHANGE UP
WBC # BLD: 5.39 K/UL — SIGNIFICANT CHANGE UP (ref 4.8–10.8)
WBC # FLD AUTO: 5.39 K/UL — SIGNIFICANT CHANGE UP (ref 4.8–10.8)

## 2023-07-25 PROCEDURE — 99232 SBSQ HOSP IP/OBS MODERATE 35: CPT

## 2023-07-25 RX ORDER — BLOOD SUGAR DIAGNOSTIC
W/DEVICE STRIP MISCELLANEOUS
Qty: 1 | Refills: 0 | Status: ACTIVE | COMMUNITY
Start: 2023-07-25 | End: 1900-01-01

## 2023-07-25 RX ORDER — INSULIN GLARGINE 100 [IU]/ML
16 INJECTION, SOLUTION SUBCUTANEOUS
Qty: 0 | Refills: 0 | DISCHARGE
Start: 2023-07-25

## 2023-07-25 RX ORDER — INSULIN LISPRO 100/ML
4 VIAL (ML) SUBCUTANEOUS
Qty: 5 | Refills: 0
Start: 2023-07-25

## 2023-07-25 RX ORDER — ACETAMINOPHEN 500 MG
2 TABLET ORAL
Qty: 0 | Refills: 0 | DISCHARGE
Start: 2023-07-25

## 2023-07-25 RX ORDER — LANCETS 28 GAUGE
EACH MISCELLANEOUS
Qty: 300 | Refills: 0 | Status: ACTIVE | COMMUNITY
Start: 2018-12-07 | End: 1900-01-01

## 2023-07-25 RX ORDER — PEN NEEDLE, DIABETIC 30 GX3/16"
30G X 5 MM NEEDLE, DISPOSABLE MISCELLANEOUS
Qty: 30 | Refills: 1 | Status: ACTIVE | COMMUNITY
Start: 2023-07-25 | End: 1900-01-01

## 2023-07-25 RX ORDER — BLOOD-GLUCOSE METER
KIT MISCELLANEOUS DAILY
Qty: 120 | Refills: 1 | Status: ACTIVE | COMMUNITY
Start: 2023-07-25 | End: 1900-01-01

## 2023-07-25 RX ORDER — ALCOHOL ANTISEPTIC PADS
70 PADS, MEDICATED (EA) TOPICAL
Qty: 90 | Refills: 1 | Status: ACTIVE | COMMUNITY
Start: 2023-07-25 | End: 1900-01-01

## 2023-07-25 RX ORDER — CEPHALEXIN 500 MG
1 CAPSULE ORAL
Qty: 24 | Refills: 0
Start: 2023-07-25 | End: 2023-07-30

## 2023-07-25 RX ADMIN — Medication 650 MILLIGRAM(S): at 23:05

## 2023-07-25 RX ADMIN — Medication 500 MILLIGRAM(S): at 23:05

## 2023-07-25 RX ADMIN — Medication 500 MILLIGRAM(S): at 02:15

## 2023-07-25 RX ADMIN — Medication 4 UNIT(S): at 17:17

## 2023-07-25 RX ADMIN — Medication 1: at 23:04

## 2023-07-25 RX ADMIN — Medication 4: at 11:32

## 2023-07-25 RX ADMIN — Medication 4 UNIT(S): at 08:27

## 2023-07-25 RX ADMIN — Medication 500 MILLIGRAM(S): at 13:19

## 2023-07-25 RX ADMIN — INSULIN GLARGINE 16 UNIT(S): 100 INJECTION, SOLUTION SUBCUTANEOUS at 23:04

## 2023-07-25 RX ADMIN — Medication 4 UNIT(S): at 11:31

## 2023-07-25 RX ADMIN — Medication 6: at 08:27

## 2023-07-25 RX ADMIN — Medication 500 MILLIGRAM(S): at 08:27

## 2023-07-25 RX ADMIN — Medication 2: at 17:17

## 2023-07-25 NOTE — PROGRESS NOTE ADULT - ATTENDING COMMENTS
HD2 with presumed ectopic pregnancy, s/p methotrexate, denies pain at this time with poorly controlled type 2 dm. Pt to be observed in house for 24 hours on new insulin regimen.

## 2023-07-25 NOTE — DISCHARGE NOTE PROVIDER - NSDCFUSCHEDAPPT_GEN_ALL_CORE_FT
Sonja Guevara  [facility]  [facility] PreAdmits  Scheduled Appointment: 07/27/2023    [facility]  OB[facility]  Scheduled Appointment: 07/27/2023

## 2023-07-25 NOTE — DISCHARGE NOTE PROVIDER - NSDCMRMEDTOKEN_GEN_ALL_CORE_FT
acetaminophen 325 mg oral tablet: 2 tab(s) orally every 6 hours as needed for Mild Pain (1 - 3)  cephalexin 500 mg oral capsule: 1 cap(s) orally every 6 hours  insulin glargine 100 units/mL subcutaneous solution: 16 unit(s) subcutaneous once a day (at bedtime)

## 2023-07-25 NOTE — DISCHARGE NOTE PROVIDER - NSDCFUADDINST_GEN_ALL_CORE_FT
No sex/intercourse until ectopic pregnancy has resolved. GYN team will inform you when this is the case.   Avoid NSAIDs, alcohol, leafy green vegetables, foods that make you gassy, folate supplements  Take Tylenol for pain as needed

## 2023-07-25 NOTE — DISCHARGE NOTE PROVIDER - CARE PROVIDER_API CALL
Earle Ponce  Endocrinology/Metab/Diabetes  [address] [place] [address]  Phone: [phone]  Fax: [phone]  Follow Up Time: 1 week

## 2023-07-25 NOTE — DISCHARGE NOTE PROVIDER - NSFOLLOWUPCLINICS_GEN_ALL_ED_FT
[facility]- Endocrine  Endocrinology  [address] [place] [address], [facility] [address]  [place] [address]  Phone: [phone]  Fax: [phone]

## 2023-07-25 NOTE — DISCHARGE NOTE PROVIDER - HOSPITAL COURSE
HPI: 37yo  LMP 2023 at 7w0d presents to ED for vaginal spotting. Patient states today she  began having spotting. Denies saturating pads or clots. States she also has 2/10 midline cramping. Patient was seen by Dr. Guevara on  bhcg at that time was 332 and MFM sono showed no IUP with right corpus luteal cyst. She has a hx of poorly controlled T2DM is not currently taking any medications for treatment. Not following with endocrinologist as an outpatient.  A1c from  >15.5. Denies polydipsia or polyuria. Endorses 2 days of dysuria. no fevers or chills. No abdominal pain, nausea or vomiting.  This is an unplanned but desired pregnancy.    LABS:                      12.7   5.67  )-----------( 214      ( 2023 17:37 )             37.5     HCG Quantitative, Serum: 403.0 mIU/mL (23 @ 17:37)    ABO RH Interpretation: AB POS (23 @ 17:37)  Antibody Screen: NEG (23 @ 17:37)        134<L>  |  100  |  12  ----------------------------<  492<HH>  4.1   |  21  |  0.9    Ca    9.1      2023 17:37    TPro  6.7  /  Alb  4.2  /  TBili  0.2  /  DBili  x   /  AST  11  /  ALT  13  /  AlkPhos  129<H>        Patient admitted for elevated blood glucose for improved control. Received Methotrexate for treatment of ectopic pregnancy.    Endocrinology Consult:   Hx of type 2 DM   - A1C 15.5 from 2023  - only on ISS inpatient     plan  - start patient on CC DASH diet   - advised on diet and lifestyle modification   - start on Lantus 16U at bedtime and Lispro 4U TID before meals + ISS   - please send C-peptide level with serum glucose level, GIANLUCA 65 ab, Islet cell ab, IA2 ab, zinc transporter ab, and insuline ab   - OP Endo follow up   - please send patient diabetic supplies and discharge on Basal+bolus insulin       Patient discharged in stable condition with recommended medications and GYN and endocrinology follow up scheduled.

## 2023-07-25 NOTE — DISCHARGE NOTE PROVIDER - NSDCCPCAREPLAN_GEN_ALL_CORE_FT
PRINCIPAL DISCHARGE DIAGNOSIS  Diagnosis: Ectopic pregnancy  Assessment and Plan of Treatment: Repeat blood work on 7/27 and 7/30. Follow up with Dr. Guevara on 7/27. Continue to repeat your blood work as directed      SECONDARY DISCHARGE DIAGNOSES  Diagnosis: Hyperglycemia  Assessment and Plan of Treatment: Continue insulin:   Lantus 16u bedtime  Lispro 4u with breakfast, lunch and dinner  follow up with Endocrinologist

## 2023-07-26 ENCOUNTER — OUTPATIENT (OUTPATIENT)
Dept: OUTPATIENT SERVICES | Facility: HOSPITAL | Age: 38
LOS: 1 days | End: 2023-07-26
Payer: MEDICAID

## 2023-07-26 ENCOUNTER — TRANSCRIPTION ENCOUNTER (OUTPATIENT)
Age: 38
End: 2023-07-26

## 2023-07-26 VITALS — HEART RATE: 76 BPM | SYSTOLIC BLOOD PRESSURE: 101 MMHG | DIASTOLIC BLOOD PRESSURE: 60 MMHG

## 2023-07-26 LAB
ANION GAP SERPL CALC-SCNC: 12 MMOL/L — SIGNIFICANT CHANGE UP (ref 7–14)
B-OH-BUTYR SERPL-SCNC: 0.3 MMOL/L — SIGNIFICANT CHANGE UP
BASOPHILS # BLD AUTO: 0.01 K/UL — SIGNIFICANT CHANGE UP (ref 0–0.2)
BASOPHILS NFR BLD AUTO: 0.2 % — SIGNIFICANT CHANGE UP (ref 0–1)
BUN SERPL-MCNC: 11 MG/DL — SIGNIFICANT CHANGE UP (ref 10–20)
CALCIUM SERPL-MCNC: 8.3 MG/DL — LOW (ref 8.4–10.5)
CHLORIDE SERPL-SCNC: 104 MMOL/L — SIGNIFICANT CHANGE UP (ref 98–110)
CO2 SERPL-SCNC: 20 MMOL/L — SIGNIFICANT CHANGE UP (ref 17–32)
CREAT SERPL-MCNC: <0.5 MG/DL — LOW (ref 0.7–1.5)
EGFR: 128 ML/MIN/1.73M2 — SIGNIFICANT CHANGE UP
EOSINOPHIL # BLD AUTO: 0.05 K/UL — SIGNIFICANT CHANGE UP (ref 0–0.7)
EOSINOPHIL NFR BLD AUTO: 1 % — SIGNIFICANT CHANGE UP (ref 0–8)
GLUCOSE BLDC GLUCOMTR-MCNC: 226 MG/DL — HIGH (ref 70–99)
GLUCOSE BLDC GLUCOMTR-MCNC: 236 MG/DL — HIGH (ref 70–99)
GLUCOSE BLDC GLUCOMTR-MCNC: 280 MG/DL — HIGH (ref 70–99)
GLUCOSE SERPL-MCNC: 228 MG/DL — HIGH (ref 70–99)
HCT VFR BLD CALC: 35.2 % — LOW (ref 37–47)
HGB BLD-MCNC: 11.7 G/DL — LOW (ref 12–16)
IMM GRANULOCYTES NFR BLD AUTO: 0.4 % — HIGH (ref 0.1–0.3)
LYMPHOCYTES # BLD AUTO: 1.62 K/UL — SIGNIFICANT CHANGE UP (ref 1.2–3.4)
LYMPHOCYTES # BLD AUTO: 30.8 % — SIGNIFICANT CHANGE UP (ref 20.5–51.1)
MAGNESIUM SERPL-MCNC: 1.9 MG/DL — SIGNIFICANT CHANGE UP (ref 1.8–2.4)
MCHC RBC-ENTMCNC: 25.8 PG — LOW (ref 27–31)
MCHC RBC-ENTMCNC: 33.2 G/DL — SIGNIFICANT CHANGE UP (ref 32–37)
MCV RBC AUTO: 77.5 FL — LOW (ref 81–99)
MONOCYTES # BLD AUTO: 0.28 K/UL — SIGNIFICANT CHANGE UP (ref 0.1–0.6)
MONOCYTES NFR BLD AUTO: 5.3 % — SIGNIFICANT CHANGE UP (ref 1.7–9.3)
NEUTROPHILS # BLD AUTO: 3.28 K/UL — SIGNIFICANT CHANGE UP (ref 1.4–6.5)
NEUTROPHILS NFR BLD AUTO: 62.3 % — SIGNIFICANT CHANGE UP (ref 42.2–75.2)
NRBC # BLD: 0 /100 WBCS — SIGNIFICANT CHANGE UP (ref 0–0)
PHOSPHATE SERPL-MCNC: 3.8 MG/DL — SIGNIFICANT CHANGE UP (ref 2.1–4.9)
PLATELET # BLD AUTO: 220 K/UL — SIGNIFICANT CHANGE UP (ref 130–400)
PMV BLD: 12 FL — HIGH (ref 7.4–10.4)
POTASSIUM SERPL-MCNC: 3.7 MMOL/L — SIGNIFICANT CHANGE UP (ref 3.5–5)
POTASSIUM SERPL-SCNC: 3.7 MMOL/L — SIGNIFICANT CHANGE UP (ref 3.5–5)
RBC # BLD: 4.54 M/UL — SIGNIFICANT CHANGE UP (ref 4.2–5.4)
RBC # FLD: 13 % — SIGNIFICANT CHANGE UP (ref 11.5–14.5)
SODIUM SERPL-SCNC: 136 MMOL/L — SIGNIFICANT CHANGE UP (ref 135–146)
WBC # BLD: 5.26 K/UL — SIGNIFICANT CHANGE UP (ref 4.8–10.8)
WBC # FLD AUTO: 5.26 K/UL — SIGNIFICANT CHANGE UP (ref 4.8–10.8)

## 2023-07-26 PROCEDURE — 36415 COLL VENOUS BLD VENIPUNCTURE: CPT

## 2023-07-26 PROCEDURE — 84702 CHORIONIC GONADOTROPIN TEST: CPT

## 2023-07-26 RX ADMIN — Medication 500 MILLIGRAM(S): at 09:09

## 2023-07-26 RX ADMIN — Medication 650 MILLIGRAM(S): at 00:05

## 2023-07-26 RX ADMIN — Medication 4 UNIT(S): at 11:52

## 2023-07-26 RX ADMIN — Medication 500 MILLIGRAM(S): at 02:01

## 2023-07-26 RX ADMIN — Medication 4 UNIT(S): at 08:46

## 2023-07-26 RX ADMIN — Medication 4: at 11:53

## 2023-07-26 RX ADMIN — Medication 650 MILLIGRAM(S): at 06:34

## 2023-07-26 RX ADMIN — Medication 4: at 08:47

## 2023-07-26 NOTE — PROGRESS NOTE ADULT - ASSESSMENT
39yo  LMP 2023 at 7w0d with vaginal spotting, abnormally rising bhcg, US findings suspicious for ectopic pregnancy, admitted for glucose control.    - repeat bhcg  (day 4) and  (day 7)- will order if still in house  - low dose sliding scale ordered   - [facility]  - endocrinology consult ordered  - vitals per routine  - AM labs  - activity as tolerated  - carb consistent diet  - keflex 500mg q6 for UTI    Dr. Da Silva aware  
39yo , HD#2, LMP 2023 at 7w0d with vaginal spotting, abnormally rising bhcg, US findings suspicious for ectopic pregnancy, admitted for glucose control.    - repeat bhcg  (day 4) and  (day 7)- will order if still in house, pt has appt with PMD Dr. Guevara OP on   - desires Nexplanon for contraception, insertion in house vs. at follow up appt.  - low dose sliding scale ordered   - [facility]  - endocrinology consult: - start on Lantus 16U at bedtime and Lispro 4U TID before meals + ISS, please send C-peptide level with serum glucose level, GIANLUCA 65 ab, Islet cell ab, IA2 ab, zinc transporter ab, and insuline ab, OP Endo follow up, please send patient diabetic supplies and discharge on Basal+bolus insulin   - vitals per routine  - AM labs + endo labs  - activity as tolerated  - carb consistent diet  - keflex 500mg q6 for UTI, f/u Ucx    Dr. Adler and OB attending to be aware  
Can increase lantus to 20 units and lispro to 6 units three times a day with current siding scale. 
39yo , HD#3, LMP 2023 at 7w0d with vaginal spotting, abnormally rising bhcg, US findings suspicious for ectopic pregnancy, admitted for glucose control.    - repeat bhcg  (day 4) and  (day 7)- pt has appt with PMD Dr. Guevara OP on   - desires Nexplanon for contraception, insertion in house vs. at follow up appt.  - low dose sliding scale ordered   - [facility]  - endocrinology consult: - start on Lantus 16U at bedtime and Lispro 4U TID before meals + ISS, please send C-peptide level with serum glucose level, GIANLUCA 65 ab, Islet cell ab, IA2 ab, zinc transporter ab, and insuline ab, OP Endo follow up, please send patient diabetic supplies and discharge on Basal+bolus insulin   - vitals per routine  - AM labs  - will f/u endo labs  - activity as tolerated  - carb consistent diet  - keflex 500mg q6 for UTI, f/u Ucx    Dr. Adler and OB attending to be aware

## 2023-07-26 NOTE — PROGRESS NOTE ADULT - SUBJECTIVE AND OBJECTIVE BOX
Blood glucose readings continue to be in the 200s.   
PGY 2 Note    Patient examined at bedside, patient doing well, desires to go home.  Denies fevers/chills, HA/N/V, CP/SOB/palpitations, abdominal pain, vaginal bleeding, hematuria/dysuria, constipation/diarrhea. Tolerating regular carb consistent diet. Ambulating.     Vital Signs Last 24 Hrs  T(C): 36.6 (25 Jul 2023 05:00), Max: 37.1 (24 Jul 2023 15:37)  T(F): 97.9 (25 Jul 2023 05:00), Max: 98.7 (24 Jul 2023 15:37)  HR: 69 (25 Jul 2023 05:00) (69 - 97)  BP: 106/62 (25 Jul 2023 05:00) (103/58 - 113/68)  BP(mean): --  RR: 18 (25 Jul 2023 05:00) (18 - 18)  SpO2: 99% (24 Jul 2023 21:39) (99% - 100%)    Parameters below as of 25 Jul 2023 05:00  Patient On (Oxygen Delivery Method): room air    Physical Exam:  General: AAOx3. NAD  Abdomen: soft, non-tender, non-distended, +BS  VE: deferred, no bleeding on pad/chux  Ext: No edema. SCDs in place    Labs:                        11.9   5.73  )-----------( 214      ( 24 Jul 2023 11:15 )             36.2       07-24    136  |  103  |  10  ----------------------------<  301<H>  3.8   |  17  |  0.5<L>    Ca    8.8      24 Jul 2023 11:15  Phos  3.1     07-24  Mg     1.8     07-24    TPro  5.7<L>  /  Alb  3.6  /  TBili  0.4  /  DBili  x   /  AST  10  /  ALT  11  /  AlkPhos  78  07-24          Urinalysis Basic - ( 24 Jul 2023 11:15 )    Color: x / Appearance: x / SG: x / pH: x  Gluc: 301 mg/dL / Ketone: x  / Bili: x / Urobili: x   Blood: x / Protein: x / Nitrite: x   Leuk Esterase: x / RBC: x / WBC x   Sq Epi: x / Non Sq Epi: x / Bacteria: x    CAPILLARY BLOOD GLUCOSE    POCT Blood Glucose.: 378 mg/dL (24 Jul 2023 21:57)    MEDICATIONS  (STANDING):  cephalexin 500 milliGRAM(s) Oral every 6 hours  dextrose 5%. 1000 milliLiter(s) (100 mL/Hr) IV Continuous <Continuous>  dextrose 5%. 1000 milliLiter(s) (50 mL/Hr) IV Continuous <Continuous>  dextrose 5%. 1000 milliLiter(s) (50 mL/Hr) IV Continuous <Continuous>  dextrose 5%. 1000 milliLiter(s) (100 mL/Hr) IV Continuous <Continuous>  dextrose 50% Injectable 25 Gram(s) IV Push once  dextrose 50% Injectable 25 Gram(s) IV Push once  dextrose 50% Injectable 12.5 Gram(s) IV Push once  dextrose 50% Injectable 25 Gram(s) IV Push once  glucagon  Injectable 1 milliGRAM(s) IntraMuscular once  glucagon  Injectable 1 milliGRAM(s) IntraMuscular once  insulin glargine Injectable (LANTUS) 16 Unit(s) SubCutaneous at bedtime  insulin lispro (ADMELOG) corrective regimen sliding scale   SubCutaneous at bedtime  insulin lispro (ADMELOG) corrective regimen sliding scale   SubCutaneous three times a day before meals  insulin lispro Injectable (ADMELOG) 4 Unit(s) SubCutaneous three times a day before meals    MEDICATIONS  (PRN):  acetaminophen     Tablet .. 650 milliGRAM(s) Oral every 6 hours PRN Mild Pain (1 - 3)  dextrose Oral Gel 15 Gram(s) Oral once PRN Blood Glucose LESS THAN 70 milliGRAM(s)/deciliter  dextrose Oral Gel 15 Gram(s) Oral once PRN Blood Glucose LESS THAN 70 milliGRAM(s)/deciliter    
PGY 2 Note    Patient examined at bedside, patient doing well, desires to go home.  Denies fevers/chills, HA/N/V, CP/SOB/palpitations, abdominal pain, vaginal bleeding, hematuria/dysuria, constipation/diarrhea. Tolerating regular carb consistent diet. Ambulating.     Vital Signs Last 24 Hrs  T(C): 36.9 (25 Jul 2023 15:08), Max: 36.9 (25 Jul 2023 15:08)  T(F): 98.4 (25 Jul 2023 15:08), Max: 98.4 (25 Jul 2023 15:08)  HR: 85 (25 Jul 2023 15:08) (79 - 85)  BP: 103/59 (25 Jul 2023 15:08) (103/59 - 109/69)  RR: 18 (25 Jul 2023 15:08) (18 - 18)  SpO2: 100% (25 Jul 2023 07:16) (100% - 100%)    Physical Exam:  General: AAOx3. NAD  Abdomen: soft, non-tender, non-distended, +BS  VE: deferred, no bleeding on pad/chux  Ext: No edema. SCDs in place    Labs:                        12.4   5.39  )-----------( 234      ( 25 Jul 2023 07:06 )             38.1   07-25    132<L>  |  102  |  13  ----------------------------<  236<H>  3.9   |  19  |  0.5<L>    Ca    8.8      25 Jul 2023 07:06  Phos  3.5     07-25  Mg     2.0     07-25    TPro  5.7<L>  /  Alb  3.6  /  TBili  0.4  /  DBili  x   /  AST  10  /  ALT  11  /  AlkPhos  78  07-24    CAPILLARY BLOOD GLUCOSE  POCT Blood Glucose.: 268 mg/dL (25 Jul 2023 22:56)  POCT Blood Glucose.: 263 mg/dL (25 Jul 2023 21:16)  POCT Blood Glucose.: 190 mg/dL (25 Jul 2023 17:08)  POCT Blood Glucose.: 206 mg/dL (25 Jul 2023 11:15)  POCT Blood Glucose.: 267 mg/dL (25 Jul 2023 07:59)    MEDICATIONS  (STANDING):  cephalexin 500 milliGRAM(s) Oral every 6 hours  insulin glargine Injectable (LANTUS) 16 Unit(s) SubCutaneous at bedtime  insulin lispro (ADMELOG) corrective regimen sliding scale   SubCutaneous at bedtime  insulin lispro (ADMELOG) corrective regimen sliding scale   SubCutaneous three times a day before meals  insulin lispro Injectable (ADMELOG) 4 Unit(s) SubCutaneous three times a day before meals    MEDICATIONS  (PRN):  acetaminophen     Tablet .. 650 milliGRAM(s) Oral every 6 hours PRN Mild Pain (1 - 3)  dextrose Oral Gel 15 Gram(s) Oral once PRN Blood Glucose LESS THAN 70 milliGRAM(s)/deciliter  
PGY 3 Note    Patient examined at bedside, pain well controlled on PO medications.  Denies fevers/chills, HA/N/V, CP/SOB/palpitations, abdominal pain, vaginal bleeding, hematuria/dysuria, constipation/diarrhea. Tolerating regular carb consistent diet. Ambulating.     T(F): 97.6 (07-24-23 @ 00:53), Max: 97.6 (07-24-23 @ 00:53)  HR: 84 (07-24-23 @ 00:53) (84 - 102)  BP: 102/73 (07-24-23 @ 00:53) (102/73 - 106/64)  RR: 18 (07-24-23 @ 00:53) (17 - 18)  SpO2: 98% (07-24-23 @ 00:53) (98% - 98%)    POCT Blood Glucose.: 362 mg/dL (23 Jul 2023 23:25)    Physical Exam:  General: AAOx3. NAD  CVS: RRR. Nl S1S2  Lungs: CTAB  Abdomen: soft, non-tender, non-distended, +BSx4  VE: deferred, no bleeding on pad/chux  Ext: No edema. SCDs in place    Labs:             12.7   5.67  )-----------( 214      ( 07-23 @ 17:37 )             37.5      07-23    134<L>  |  100  |  12  ----------------------------<  492<HH>  4.1   |  21  |  0.9    Ca    9.1      23 Jul 2023 17:37    TPro  6.7  /  Alb  4.2  /  TBili  0.2  /  DBili  x   /  AST  11  /  ALT  13  /  AlkPhos  129<H>  07-23      Trend:             12.7   5.67  )-----------( 214      ( 07-23 @ 17:37 )             37.5         Creatinine: 0.9 (07-23)      Medications:  MEDICATIONS  (STANDING):  cephalexin 500 milliGRAM(s) Oral every 6 hours  dextrose 5%. 1000 milliLiter(s) (100 mL/Hr) IV Continuous <Continuous>  dextrose 5%. 1000 milliLiter(s) (50 mL/Hr) IV Continuous <Continuous>  dextrose 50% Injectable 25 Gram(s) IV Push once  dextrose 50% Injectable 12.5 Gram(s) IV Push once  dextrose 50% Injectable 25 Gram(s) IV Push once  glucagon  Injectable 1 milliGRAM(s) IntraMuscular once  insulin lispro (ADMELOG) corrective regimen sliding scale   SubCutaneous three times a day before meals  insulin lispro (ADMELOG) corrective regimen sliding scale   SubCutaneous at bedtime    MEDICATIONS  (PRN):  dextrose Oral Gel 15 Gram(s) Oral once PRN Blood Glucose LESS THAN 70 milliGRAM(s)/deciliter    FS: 362 (3U)

## 2023-07-26 NOTE — DISCHARGE NOTE NURSING/CASE MANAGEMENT/SOCIAL WORK - NSCORESITESY/N_GEN_A_CORE_RD
Obtain eye exam soon as possible    Consultation with neurology    Monitor blood pressure and pulse twice daily    Stay well-hydrated with water and electrolyte drinks such as Gatorade.   Make sure your urine is light yellow or clear    Proceed to the emergency department with any new or worsening symptoms No

## 2023-07-26 NOTE — DISCHARGE NOTE NURSING/CASE MANAGEMENT/SOCIAL WORK - PATIENT PORTAL LINK FT
You can access the FollowMyHealth Patient Portal offered by [facility] by registering at the following website: [email or web address]. By joining [email or web address]’s FollowMyHealth portal, you will also be able to view your health information using other applications (apps) compatible with our system.

## 2023-07-27 ENCOUNTER — OUTPATIENT (OUTPATIENT)
Dept: OUTPATIENT SERVICES | Facility: HOSPITAL | Age: 38
LOS: 1 days | End: 2023-07-27
Payer: MEDICAID

## 2023-07-27 ENCOUNTER — APPOINTMENT (OUTPATIENT)
Dept: OBGYN | Facility: CLINIC | Age: 38
End: 2023-07-27
Payer: MEDICAID

## 2023-07-27 DIAGNOSIS — O36.80X0 PREGNANCY WITH INCONCLUSIVE FETAL VIABILITY, NOT APPLICABLE OR UNSPECIFIED: ICD-10-CM

## 2023-07-27 DIAGNOSIS — O00.90 UNSPECIFIED. ECTOPIC. PREGNANCY WITHOUT INTRAUTERINE PREGNANCY: ICD-10-CM

## 2023-07-27 DIAGNOSIS — Z98.890 OTHER SPECIFIED POSTPROCEDURAL STATES: Chronic | ICD-10-CM

## 2023-07-27 LAB — HCG SERPL-MCNC: 430.4 MIU/ML

## 2023-07-27 PROCEDURE — 99213 OFFICE O/P EST LOW 20 MIN: CPT

## 2023-07-27 PROCEDURE — A4562: CPT

## 2023-07-27 PROCEDURE — 11981 INSERTION DRUG DLVR IMPLANT: CPT

## 2023-07-27 PROCEDURE — 99213 OFFICE O/P EST LOW 20 MIN: CPT | Mod: TH,25

## 2023-07-28 ENCOUNTER — TRANSCRIPTION ENCOUNTER (OUTPATIENT)
Age: 38
End: 2023-07-28

## 2023-07-28 DIAGNOSIS — Z30.09 ENCOUNTER FOR OTHER GENERAL COUNSELING AND ADVICE ON CONTRACEPTION: ICD-10-CM

## 2023-07-28 DIAGNOSIS — O00.90 UNSPECIFIED ECTOPIC PREGNANCY WITHOUT INTRAUTERINE PREGNANCY: ICD-10-CM

## 2023-07-28 DIAGNOSIS — E11.9 TYPE 2 DIABETES MELLITUS WITHOUT COMPLICATIONS: ICD-10-CM

## 2023-07-28 LAB
GAD65 AB SER-MCNC: 0 NMOL/L — SIGNIFICANT CHANGE UP
ISLET CELL512 AB SER-ACNC: SIGNIFICANT CHANGE UP
ISLET CELL512 AB SER-SCNC: 0 NMOL/L — SIGNIFICANT CHANGE UP

## 2023-07-31 ENCOUNTER — APPOINTMENT (OUTPATIENT)
Dept: ANTEPARTUM | Facility: CLINIC | Age: 38
End: 2023-07-31

## 2023-07-31 DIAGNOSIS — O24.111 PRE-EXISTING TYPE 2 DIABETES MELLITUS, IN PREGNANCY, FIRST TRIMESTER: ICD-10-CM

## 2023-07-31 DIAGNOSIS — Z91.A28 CAREGIVER'S INTENTIONAL UNDERDOSING OF MEDICATION REGIMEN FOR OTHER REASON: ICD-10-CM

## 2023-07-31 DIAGNOSIS — Z91.119 PATIENT'S NONCOMPLIANCE WITH DIETARY REGIMEN DUE TO UNSPECIFIED REASON: ICD-10-CM

## 2023-07-31 DIAGNOSIS — Z3A.01 LESS THAN 8 WEEKS GESTATION OF PREGNANCY: ICD-10-CM
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
## 2023-07-31 DIAGNOSIS — O00.90 UNSPECIFIED ECTOPIC PREGNANCY WITHOUT INTRAUTERINE PREGNANCY: ICD-10-CM

## 2023-07-31 DIAGNOSIS — O9A.211 INJURY, POISONING AND CERTAIN OTHER CONSEQUENCES OF EXTERNAL CAUSES COMPLICATING PREGNANCY, FIRST TRIMESTER: ICD-10-CM

## 2023-07-31 DIAGNOSIS — E11.65 TYPE 2 DIABETES MELLITUS WITH HYPERGLYCEMIA: ICD-10-CM

## 2023-07-31 DIAGNOSIS — O34.81 MATERNAL CARE FOR OTHER ABNORMALITIES OF PELVIC ORGANS, FIRST TRIMESTER: ICD-10-CM

## 2023-07-31 DIAGNOSIS — O26.851 SPOTTING COMPLICATING PREGNANCY, FIRST TRIMESTER: ICD-10-CM

## 2023-07-31 DIAGNOSIS — Z91.128 PATIENT'S INTENTIONAL UNDERDOSING OF MEDICATION REGIMEN FOR OTHER REASON: ICD-10-CM

## 2023-07-31 DIAGNOSIS — N83.11 CORPUS LUTEUM CYST OF RIGHT OVARY: ICD-10-CM

## 2023-07-31 DIAGNOSIS — T38.3X6A UNDERDOSING OF INSULIN AND ORAL HYPOGLYCEMIC [ANTIDIABETIC] DRUGS, INITIAL ENCOUNTER: ICD-10-CM

## 2023-07-31 LAB — INSULIN ANTIBODIES: <5 UU/ML — SIGNIFICANT CHANGE UP

## 2023-08-03 ENCOUNTER — APPOINTMENT (OUTPATIENT)
Dept: OBGYN | Facility: CLINIC | Age: 38
End: 2023-08-03

## 2023-08-03 VITALS — WEIGHT: 155 LBS | HEIGHT: 61 IN | BODY MASS INDEX: 29.27 KG/M2

## 2023-08-04 LAB — ZINC TRANSPORTER 8 AB, RESULT: <15 U/ML — SIGNIFICANT CHANGE UP

## 2023-08-07 ENCOUNTER — OUTPATIENT (OUTPATIENT)
Dept: OUTPATIENT SERVICES | Facility: HOSPITAL | Age: 38
LOS: 1 days | End: 2023-08-07
Payer: MEDICAID

## 2023-08-07 DIAGNOSIS — Z98.890 OTHER SPECIFIED POSTPROCEDURAL STATES: Chronic | ICD-10-CM

## 2023-08-07 DIAGNOSIS — Z34.90 ENCOUNTER FOR SUPERVISION OF NORMAL PREGNANCY, UNSPECIFIED, UNSPECIFIED TRIMESTER: ICD-10-CM

## 2023-08-07 PROCEDURE — 84702 CHORIONIC GONADOTROPIN TEST: CPT

## 2023-08-08 DIAGNOSIS — Z34.90 ENCOUNTER FOR SUPERVISION OF NORMAL PREGNANCY, UNSPECIFIED, UNSPECIFIED TRIMESTER: ICD-10-CM

## 2023-08-08 LAB — HCG SERPL-MCNC: 74.9 MIU/ML

## 2023-08-09 ENCOUNTER — APPOINTMENT (OUTPATIENT)
Dept: ANTEPARTUM | Facility: CLINIC | Age: 38
End: 2023-08-09

## 2023-08-22 NOTE — CHART NOTE - NSCHARTNOTEFT_GEN_A_CORE
PGY2 Note    Attempted to call patient to remind her to go to the lab for her repeat bhcg. Patient did not answer the phone, voicemail left with instructions to perform blood work today at any [facility] lab with call back information.      Dr. Moore and Dr. Guevara to be aware.

## 2023-08-24 NOTE — HISTORY OF PRESENT ILLNESS
[FreeTextEntry1] : Patient presenting today for follow up after MTX management for ectopic and admission for uncontrolled diabetes.  She is Day #4 s/p methotrexate management, due for labs. She is presenting today feeling well, desires effective contraception to avoid pregnancy until her medical conditions are optimized.

## 2023-08-25 ENCOUNTER — OUTPATIENT (OUTPATIENT)
Dept: OUTPATIENT SERVICES | Facility: HOSPITAL | Age: 38
LOS: 1 days | End: 2023-08-25
Payer: MEDICAID

## 2023-08-25 DIAGNOSIS — Z98.890 OTHER SPECIFIED POSTPROCEDURAL STATES: Chronic | ICD-10-CM

## 2023-08-25 LAB — HCG SERPL-MCNC: 5.3 MIU/ML

## 2023-08-25 PROCEDURE — 84702 CHORIONIC GONADOTROPIN TEST: CPT

## 2023-08-25 NOTE — CHART NOTE - NSCHARTNOTEFT_GEN_A_CORE
HPI from ED visit:   39yo  LMP 2023 at 7w0d presents to ED for vaginal spotting. Patient states today she  began having spotting. Denies saturating pads or clots. States she also has 2/10 midline cramping. Patient was seen by Dr. Guevara on  bhcg at that time was 332 and MFM sono showed no IUP with right corpus luteal cyst. She has a hx of poorly controlled T2DM is not currently taking any medications for treatment. Not following with endocrinologist as an outpatient.  A1c from  >15.5. Denies polydipsia or polyuria. Endorses 2 days of dysuria. no fevers or chills. No abdominal pain, nausea or vomiting.  This is an unplanned but desired pregnancy.    5.67>12.7/37.5<214, 134/4.1/100/21/12/0.9<492, AST/ALT , AB pos, , b-hydroxy-butyrate 2.3 (H), hcg 403     TVUS IMPRESSION: No intrauterine gestational sac is visualized. Findings are compatible with pregnancyof unknown location, and differential includes early pregnancy and ectopic pregnancy. 2.4 cm echogenic left paraovarian structure with peripheral Doppler flow.  In the setting of slowly rising beta hCG and pregnancy of unknown  location this finding is highly suspicious"    She was assessed as:   39yo  LMP 2023 at 7w0d with vaginal spotting, abnormally rising bhcg, US findings suspicious for ectopic pregnancy, to be admitted for glucose control. Administered 88mg IM MTX.     Instructed to obtained serial b-hCGs.    bhcg 430 (day 4, 6.7% increase)   bhcg 74.9 (total 83% decrease)   bhcg 5.3
PGY2 GYN Contact Note    Called patient to remind to repeat hCG hormone today. No response, voicemail left with instructions to perform blood work today at any [facility] lab with call back information.    Dr. Jules and Dr. Castelan aware
PGY2 Note     Spoke with patient on phone, states she'll go to the lab tomorrow morning to repeat her bhcg level.     Dr. Guevara to be made aware.
PGY2 Note    Attempted to call patient as reminder to go to lab to repeat beta hcg.  Patient did not answer, voicemail full, SMS sent.      Dr. Guevara to be made aware.
PGY2 Note    Attempted to call patient as reminder to go to lab to repeat beta hcg.  Patient did not answer, voicemail left with reminder and callback number [phone].    Dr. Guevara to be made aware.
PGY2 Note    Attempted to call patient as reminder to go to lab to repeat beta hcg.  Patient did not answer, voicemail left with reminder and callback number [phone].    Dr. Guevara to be made aware.
PGY2 Note    Attempted to call patient to remind her to go to the lab for her repeat bhcg. Patient did not answer the phone. Voicemail box full. SMS notification sent.    Dr. Moreira and Dr. Guevara to be aware
PGY2 Note    Called patient to follow up how she was feeling after receiving the methotrexate. Patient denies vaginal bleeding, abdominal pain, cramping, nausea, vomiting. Reports that she is at work and will not be able to get her repeat bhcg done today. Discussed with the patient the importance of repeating blood work to see if the methotrexate was working. Patient expressed understanding.    Dr. Vazquez and Dr. Angeles aware
PGY2 Note    Spoke with patient on phone to discuss bhcg level, appropriately dropping.  States she has an appointment with internal medicine tomorrow.  Will repeat bhcg on 8/14.     Dr. Guevara to be made aware.
PGY2 Note    Spoke with patient on phone, states it is difficult to get off work to do labwork, letter excusing her from work to perform blood work was sent to her email.  Team to follow.      Dr. Guevara to be made aware.
PGY2 Note    Spoke with patient on the phone, states she will go to the lab today to repeat her b hCG. Currently, asymptomatic.      Team to follow results and contact patient.     Dr. Guevara to be made aware.
PGY2 note    Spoke with patient, states she will try to go to lab today to repeat bhcg.  States she recently started a new job and it is difficult to leave, provided an excuse letter via email.      Dr. Guevara to be made aware.
PGY 2 Note    Attempted to call patient as reminder to go to lab to repeat beta hcg.  Patient did not answer, voicemail box full. SMS sent with callback number.    Dr. Guevara to be made aware.
PGY2 Note     Attempted to call patient to remind her to go to lab to repeat bhcg (Day 7 s/p MTX). No answer, voicemail left with call back number.      Dr. Guevara to be made aware.
PGY2 Note    Attempted to call patient as reminder to go to lab to repeat beta hcg.  Patient did not answer, voicemail left with reminder and callback number [phone].  Dr. Guevara to be made aware.
PGY2 Note    Attempted to call patient to remind her to go to the lab for her repeat bhcg. Patient did not answer the phone. Voicemail box full. SMS notification sent.    Dr. Moore and Dr. Man to be aware.
PGY2 Note    Called patient to see how she was doing. She reports rectal pressure and pain, rated 4/10. Denies nausea, vomiting, vaginal bleeding. Reassured patient this was expected after the methotrexate. Patient reports that she is going to get her lab work done and to her appointment with Dr. Guevara today. All questions answered.    Dr. Angeles and Dr. Guevara aware
PGY2 Note    Patient was unable to go to lab yesterday for her Day 7 beta hcg. Spoke with her on the phone, currently asymptomatic, states she will go to the lab tomorrow.     Dr. Guevara to be made aware.

## 2023-08-25 NOTE — CHART NOTE - NSCHARTNOTESELECT_GEN_ALL_CORE
Event Note
GYN/Event Note
Event Note

## 2023-08-28 NOTE — PLAN
[FreeTextEntry1] : Ultrasound: No visible IUP or EUP   A/P: 37yo with pregnancy of unknown location, abnormal HCG rise.   Abnormal pregnancy reviewed, discussed EPL vs ectopic precautions   Repeat US, HCG   Follow up with endocrinology for diabetes management given uncontrolled DM, DM precautions reviewed

## 2023-12-20 NOTE — ED ADULT TRIAGE NOTE - HEIGHT IN FEET
5
please follow up with urology in 1-3 days to reassess symptoms     please return to the nearest ED immediately with any new/worsening symptoms

## 2023-12-28 DIAGNOSIS — O36.80X0 PREGNANCY WITH INCONCLUSIVE FETAL VIABILITY, NOT APPLICABLE OR UNSPECIFIED: ICD-10-CM

## 2023-12-29 DIAGNOSIS — O36.80X0 PREGNANCY WITH INCONCLUSIVE FETAL VIABILITY, NOT APPLICABLE OR UNSPECIFIED: ICD-10-CM

## 2024-01-11 ENCOUNTER — APPOINTMENT (OUTPATIENT)
Dept: OBGYN | Facility: CLINIC | Age: 39
End: 2024-01-11
Payer: MEDICAID

## 2024-01-11 ENCOUNTER — LABORATORY RESULT (OUTPATIENT)
Age: 39
End: 2024-01-11

## 2024-01-11 ENCOUNTER — OUTPATIENT (OUTPATIENT)
Dept: OUTPATIENT SERVICES | Facility: HOSPITAL | Age: 39
LOS: 1 days | End: 2024-01-11
Payer: MEDICAID

## 2024-01-11 VITALS — SYSTOLIC BLOOD PRESSURE: 124 MMHG | WEIGHT: 149.25 LBS | BODY MASS INDEX: 28.2 KG/M2 | DIASTOLIC BLOOD PRESSURE: 83 MMHG

## 2024-01-11 DIAGNOSIS — Z98.890 OTHER SPECIFIED POSTPROCEDURAL STATES: Chronic | ICD-10-CM

## 2024-01-11 DIAGNOSIS — Z30.09 ENCOUNTER FOR OTHER GENERAL COUNSELING AND ADVICE ON CONTRACEPTION: ICD-10-CM

## 2024-01-11 DIAGNOSIS — Z00.00 ENCOUNTER FOR GENERAL ADULT MEDICAL EXAMINATION WITHOUT ABNORMAL FINDINGS: ICD-10-CM

## 2024-01-11 DIAGNOSIS — E11.9 TYPE 2 DIABETES MELLITUS WITHOUT COMPLICATIONS: ICD-10-CM

## 2024-01-11 PROCEDURE — 81025 URINE PREGNANCY TEST: CPT

## 2024-01-11 PROCEDURE — 36415 COLL VENOUS BLD VENIPUNCTURE: CPT

## 2024-01-11 PROCEDURE — 99214 OFFICE O/P EST MOD 30 MIN: CPT

## 2024-01-11 PROCEDURE — 80053 COMPREHEN METABOLIC PANEL: CPT

## 2024-01-11 PROCEDURE — 83036 HEMOGLOBIN GLYCOSYLATED A1C: CPT

## 2024-01-11 PROCEDURE — 99215 OFFICE O/P EST HI 40 MIN: CPT

## 2024-01-11 PROCEDURE — 86701 HIV-1ANTIBODY: CPT

## 2024-01-11 PROCEDURE — 85027 COMPLETE CBC AUTOMATED: CPT

## 2024-01-11 PROCEDURE — 87389 HIV-1 AG W/HIV-1&-2 AB AG IA: CPT

## 2024-01-11 PROCEDURE — 86780 TREPONEMA PALLIDUM: CPT

## 2024-01-11 PROCEDURE — 87340 HEPATITIS B SURFACE AG IA: CPT

## 2024-01-12 LAB
BASOPHILS # BLD AUTO: 0.02 K/UL
BASOPHILS NFR BLD AUTO: 0.3 %
EOSINOPHIL # BLD AUTO: 0.02 K/UL
EOSINOPHIL NFR BLD AUTO: 0.3 %
HBV SURFACE AG SER QL: NONREACTIVE
HCG UR QL: NEGATIVE
HCT VFR BLD CALC: 40 %
HGB BLD-MCNC: 12.7 G/DL
IMM GRANULOCYTES NFR BLD AUTO: 0.2 %
LYMPHOCYTES # BLD AUTO: 1.5 K/UL
LYMPHOCYTES NFR BLD AUTO: 26.2 %
MAN DIFF?: NORMAL
MCHC RBC-ENTMCNC: 25.5 PG
MCHC RBC-ENTMCNC: 31.8 G/DL
MCV RBC AUTO: 80.3 FL
MONOCYTES # BLD AUTO: 0.29 K/UL
MONOCYTES NFR BLD AUTO: 5.1 %
NEUTROPHILS # BLD AUTO: 3.88 K/UL
NEUTROPHILS NFR BLD AUTO: 67.9 %
PLATELET # BLD AUTO: 201 K/UL
QUALITY CONTROL: YES
RBC # BLD: 4.98 M/UL
RBC # FLD: 12.5 %
WBC # FLD AUTO: 5.72 K/UL

## 2024-01-13 DIAGNOSIS — E11.9 TYPE 2 DIABETES MELLITUS W/OUT COMPLICATIONS: ICD-10-CM

## 2024-01-13 RX ORDER — INSULIN GLARGINE 100 [IU]/ML
100 INJECTION, SOLUTION SUBCUTANEOUS
Qty: 2 | Refills: 1 | Status: ACTIVE | COMMUNITY
Start: 2023-07-25 | End: 1900-01-01

## 2024-01-13 RX ORDER — BLOOD-GLUCOSE METER
EACH MISCELLANEOUS
Qty: 1 | Refills: 0 | Status: ACTIVE | COMMUNITY
Start: 2024-01-13 | End: 1900-01-01

## 2024-01-13 RX ORDER — BLOOD-GLUCOSE METER
W/DEVICE EACH MISCELLANEOUS
Qty: 1 | Refills: 0 | Status: ACTIVE | COMMUNITY
Start: 2024-01-13 | End: 1900-01-01

## 2024-01-13 RX ORDER — PNV 119/IRON FUM/FOLIC ACID 29 MG-1 MG
TABLET ORAL DAILY
Qty: 90 | Refills: 3 | Status: ACTIVE | COMMUNITY
Start: 2024-01-13 | End: 1900-01-01

## 2024-01-13 RX ORDER — LANCETS 33 GAUGE
EACH MISCELLANEOUS
Qty: 1 | Refills: 9 | Status: ACTIVE | COMMUNITY
Start: 2024-01-13 | End: 1900-01-01

## 2024-01-13 RX ORDER — ISOPROPYL ALCOHOL 70 ML/100ML
SWAB TOPICAL
Qty: 1 | Refills: 4 | Status: ACTIVE | COMMUNITY
Start: 2024-01-13 | End: 1900-01-01

## 2024-01-13 RX ORDER — BLOOD SUGAR DIAGNOSTIC
STRIP MISCELLANEOUS 4 TIMES DAILY
Qty: 120 | Refills: 10 | Status: ACTIVE | COMMUNITY
Start: 2024-01-13 | End: 1900-01-01

## 2024-01-13 RX ORDER — INSULIN LISPRO 100 [IU]/ML
100 INJECTION, SOLUTION INTRAVENOUS; SUBCUTANEOUS
Qty: 100 | Refills: 1 | Status: ACTIVE | COMMUNITY
Start: 2023-07-25 | End: 1900-01-01

## 2024-01-18 LAB
ALBUMIN SERPL ELPH-MCNC: 4 G/DL
ALP BLD-CCNC: 133 U/L
ALT SERPL-CCNC: 17 U/L
ANION GAP SERPL CALC-SCNC: 15 MMOL/L
AST SERPL-CCNC: 13 U/L
BILIRUB SERPL-MCNC: 0.2 MG/DL
BUN SERPL-MCNC: 15 MG/DL
CALCIUM SERPL-MCNC: 9.3 MG/DL
CHLORIDE SERPL-SCNC: 97 MMOL/L
CO2 SERPL-SCNC: 22 MMOL/L
CREAT SERPL-MCNC: 0.7 MG/DL
EGFR: 112 ML/MIN/1.73M2
ESTIMATED AVERAGE GLUCOSE: >398 MG/DL
GLUCOSE SERPL-MCNC: 462 MG/DL
HBA1C MFR BLD HPLC: >15.5 %
POTASSIUM SERPL-SCNC: 4.8 MMOL/L
PROT SERPL-MCNC: 6.9 G/DL
SODIUM SERPL-SCNC: 134 MMOL/L
T PALLIDUM AB SER QL IA: NEGATIVE

## 2024-01-18 NOTE — DISCHARGE NOTE NURSING/CASE MANAGEMENT/SOCIAL WORK - NURSING SECTION COMPLETE
Patient/Caregiver provided printed discharge information. Session ID: [id number]  Language: Vincentian   ID: #[id number]   Name: Carlos

## 2024-02-29 ENCOUNTER — TRANSCRIPTION ENCOUNTER (OUTPATIENT)
Age: 39
End: 2024-02-29

## 2024-05-08 ENCOUNTER — RX RENEWAL (OUTPATIENT)
Age: 39
End: 2024-05-08

## 2024-05-08 RX ORDER — INSULIN LISPRO 100 [IU]/ML
100 INJECTION, SOLUTION INTRAVENOUS; SUBCUTANEOUS
Qty: 5 | Refills: 0 | Status: ACTIVE | COMMUNITY
Start: 2023-07-25 | End: 1900-01-01

## 2024-05-18 NOTE — OB PROVIDER H&P - FAMILY HISTORY
Father  Still living? Unknown  Family history of diabetes mellitus, Age at diagnosis: Age Unknown     Mother  Still living? Unknown  Family history of diabetes mellitus, Age at diagnosis: Age Unknown Patient has waxing and waning swelling of left upper extremity during hospitalization, per Catrina.  Noted on exam on 5/16.  DVT Duplex ultrasound Bilateral upper extremities and found to have left axial, brachial, and subclavian DVT on imaging 5/16.  - Begun heparin gtt (5/16), discontinued 5/17  - Begin Eliquis load 10mg BID x 7 days (5/17 - )

## 2024-05-19 ENCOUNTER — RX RENEWAL (OUTPATIENT)
Age: 39
End: 2024-05-19

## 2024-05-20 RX ORDER — BLOOD-GLUCOSE METER
EACH MISCELLANEOUS
Qty: 1 | Refills: 5 | Status: ACTIVE | COMMUNITY
Start: 2024-05-20 | End: 1900-01-01

## 2024-05-20 RX ORDER — ISOPRPOPYL ALCOHOL 70 ML/100ML
70 SWAB TOPICAL
Qty: 1 | Refills: 5 | Status: ACTIVE | COMMUNITY
Start: 2024-05-20 | End: 1900-01-01

## 2024-05-20 RX ORDER — PRENATAL VIT NO.130/IRON/FOLIC 27MG-0.8MG
27-0.8 TABLET ORAL
Qty: 1 | Refills: 0 | Status: ACTIVE | COMMUNITY
Start: 2018-12-27 | End: 1900-01-01

## 2024-06-10 NOTE — ED ADULT TRIAGE NOTE - NS ED NURSE BANDS TYPE
Take tamsulosin 1 pill daily with dinner.    Follow-up in August and obtain PSA blood test 3 days prior.  If PSA remains elevated, prostate MRI and biopsy should be considered.      Prostate Specific Antigen (ng/mL)   Date Value   05/30/2024 9.44 (H)       
Name band;

## 2024-06-17 ENCOUNTER — RX RENEWAL (OUTPATIENT)
Age: 39
End: 2024-06-17

## 2024-06-17 RX ORDER — DROSPIRENONE 4 MG/1
4 TABLET, FILM COATED ORAL
Qty: 28 | Refills: 0 | Status: ACTIVE | COMMUNITY
Start: 2024-01-13 | End: 1900-01-01

## 2024-06-17 RX ORDER — BLOOD SUGAR DIAGNOSTIC
STRIP MISCELLANEOUS
Qty: 100 | Refills: 0 | Status: ACTIVE | COMMUNITY
Start: 2024-05-20 | End: 1900-01-01

## 2024-07-03 ENCOUNTER — RX RENEWAL (OUTPATIENT)
Age: 39
End: 2024-07-03

## 2024-07-17 ENCOUNTER — RX RENEWAL (OUTPATIENT)
Age: 39
End: 2024-07-17

## 2024-08-05 ENCOUNTER — RX RENEWAL (OUTPATIENT)
Age: 39
End: 2024-08-05

## 2024-09-05 ENCOUNTER — RX RENEWAL (OUTPATIENT)
Age: 39
End: 2024-09-05

## 2024-10-08 ENCOUNTER — RX RENEWAL (OUTPATIENT)
Age: 39
End: 2024-10-08

## 2024-12-19 NOTE — PROCEDURAL SAFETY CHECKLIST WITH OR WITHOUT SEDATION - NSPREALLERGYSED_GEN_ALL_CORE
79 done Detail Level: Detailed Plan: Apply mometasone topical cream bid when eczema area flares and continue applying to aa until cleared. Continue Regimen: - Mometasone cream bid prn itch. Render In Strict Bullet Format?: No

## 2024-12-30 NOTE — PLAN
Care Transitions ED Outreach Telephone Call Attempt    Discharge Date: 12/28/24  Discharge Location: [facility] Hospital: [facility]    Call Attempt Date: 12/30/2024  Call Attempt: First   [FreeTextEntry1] : A/P: 39yo Day#4 s/p methotrexate for ectopic pregnancy, history of uncontrolled diabetes.   Ectopic pregnancy -  HCG drawn today   Reproductive life goals: Desires to delay pregnancy and optimize health  - MFM consult for preconception consult  - Nutrition consult  - f/u with endocrinology for DM management  - Nexplanon placed today without difficulty

## 2025-01-24 ENCOUNTER — RX RENEWAL (OUTPATIENT)
Age: 40
End: 2025-01-24
